# Patient Record
Sex: FEMALE | Race: WHITE | NOT HISPANIC OR LATINO | URBAN - METROPOLITAN AREA
[De-identification: names, ages, dates, MRNs, and addresses within clinical notes are randomized per-mention and may not be internally consistent; named-entity substitution may affect disease eponyms.]

---

## 2018-12-11 ENCOUNTER — INPATIENT (INPATIENT)
Facility: HOSPITAL | Age: 83
LOS: 2 days | Discharge: HOME | DRG: 571 | End: 2018-12-14
Attending: INTERNAL MEDICINE | Admitting: INTERNAL MEDICINE
Payer: MEDICARE

## 2018-12-11 VITALS
RESPIRATION RATE: 18 BRPM | OXYGEN SATURATION: 97 % | DIASTOLIC BLOOD PRESSURE: 81 MMHG | TEMPERATURE: 98 F | SYSTOLIC BLOOD PRESSURE: 164 MMHG | HEART RATE: 73 BPM

## 2018-12-11 DIAGNOSIS — N39.0 URINARY TRACT INFECTION, SITE NOT SPECIFIED: ICD-10-CM

## 2018-12-11 DIAGNOSIS — I10 ESSENTIAL (PRIMARY) HYPERTENSION: ICD-10-CM

## 2018-12-11 DIAGNOSIS — L03.90 CELLULITIS, UNSPECIFIED: ICD-10-CM

## 2018-12-11 DIAGNOSIS — T14.8XXA OTHER INJURY OF UNSPECIFIED BODY REGION, INITIAL ENCOUNTER: ICD-10-CM

## 2018-12-11 DIAGNOSIS — M79.89 OTHER SPECIFIED SOFT TISSUE DISORDERS: ICD-10-CM

## 2018-12-11 DIAGNOSIS — Z29.9 ENCOUNTER FOR PROPHYLACTIC MEASURES, UNSPECIFIED: ICD-10-CM

## 2018-12-11 LAB
ALBUMIN SERPL ELPH-MCNC: 3.1 G/DL — LOW (ref 3.5–5)
ALP SERPL-CCNC: 82 U/L — SIGNIFICANT CHANGE UP (ref 40–120)
ALT FLD-CCNC: 23 U/L DA — SIGNIFICANT CHANGE UP (ref 10–60)
ANION GAP SERPL CALC-SCNC: 11 MMOL/L — SIGNIFICANT CHANGE UP (ref 5–17)
APPEARANCE UR: CLEAR — SIGNIFICANT CHANGE UP
APTT BLD: 29.4 SEC — SIGNIFICANT CHANGE UP (ref 27.5–36.3)
AST SERPL-CCNC: 25 U/L — SIGNIFICANT CHANGE UP (ref 10–40)
BASOPHILS # BLD AUTO: 0.1 K/UL — SIGNIFICANT CHANGE UP (ref 0–0.2)
BASOPHILS NFR BLD AUTO: 1.5 % — SIGNIFICANT CHANGE UP (ref 0–2)
BILIRUB SERPL-MCNC: 0.4 MG/DL — SIGNIFICANT CHANGE UP (ref 0.2–1.2)
BILIRUB UR-MCNC: NEGATIVE — SIGNIFICANT CHANGE UP
BUN SERPL-MCNC: 33 MG/DL — HIGH (ref 7–18)
CALCIUM SERPL-MCNC: 9 MG/DL — SIGNIFICANT CHANGE UP (ref 8.4–10.5)
CHLORIDE SERPL-SCNC: 109 MMOL/L — HIGH (ref 96–108)
CO2 SERPL-SCNC: 22 MMOL/L — SIGNIFICANT CHANGE UP (ref 22–31)
COLOR SPEC: YELLOW — SIGNIFICANT CHANGE UP
CREAT SERPL-MCNC: 1.1 MG/DL — SIGNIFICANT CHANGE UP (ref 0.5–1.3)
DIFF PNL FLD: ABNORMAL
EOSINOPHIL # BLD AUTO: 0.1 K/UL — SIGNIFICANT CHANGE UP (ref 0–0.5)
EOSINOPHIL NFR BLD AUTO: 2 % — SIGNIFICANT CHANGE UP (ref 0–6)
GLUCOSE SERPL-MCNC: 93 MG/DL — SIGNIFICANT CHANGE UP (ref 70–99)
GLUCOSE UR QL: NEGATIVE — SIGNIFICANT CHANGE UP
HCT VFR BLD CALC: 39.5 % — SIGNIFICANT CHANGE UP (ref 34.5–45)
HGB BLD-MCNC: 12.5 G/DL — SIGNIFICANT CHANGE UP (ref 11.5–15.5)
INR BLD: 1.12 RATIO — SIGNIFICANT CHANGE UP (ref 0.88–1.16)
KETONES UR-MCNC: ABNORMAL
LACTATE SERPL-SCNC: 1.3 MMOL/L — SIGNIFICANT CHANGE UP (ref 0.7–2)
LEUKOCYTE ESTERASE UR-ACNC: ABNORMAL
LYMPHOCYTES # BLD AUTO: 2.2 K/UL — SIGNIFICANT CHANGE UP (ref 1–3.3)
LYMPHOCYTES # BLD AUTO: 29.3 % — SIGNIFICANT CHANGE UP (ref 13–44)
MCHC RBC-ENTMCNC: 29.9 PG — SIGNIFICANT CHANGE UP (ref 27–34)
MCHC RBC-ENTMCNC: 31.7 GM/DL — LOW (ref 32–36)
MCV RBC AUTO: 94.4 FL — SIGNIFICANT CHANGE UP (ref 80–100)
MONOCYTES # BLD AUTO: 0.7 K/UL — SIGNIFICANT CHANGE UP (ref 0–0.9)
MONOCYTES NFR BLD AUTO: 9.6 % — SIGNIFICANT CHANGE UP (ref 2–14)
NEUTROPHILS # BLD AUTO: 4.4 K/UL — SIGNIFICANT CHANGE UP (ref 1.8–7.4)
NEUTROPHILS NFR BLD AUTO: 57.6 % — SIGNIFICANT CHANGE UP (ref 43–77)
NITRITE UR-MCNC: NEGATIVE — SIGNIFICANT CHANGE UP
PH UR: 5 — SIGNIFICANT CHANGE UP (ref 5–8)
PLATELET # BLD AUTO: 358 K/UL — SIGNIFICANT CHANGE UP (ref 150–400)
POTASSIUM SERPL-MCNC: 4.4 MMOL/L — SIGNIFICANT CHANGE UP (ref 3.5–5.3)
POTASSIUM SERPL-SCNC: 4.4 MMOL/L — SIGNIFICANT CHANGE UP (ref 3.5–5.3)
PROT SERPL-MCNC: 7.4 G/DL — SIGNIFICANT CHANGE UP (ref 6–8.3)
PROT UR-MCNC: 30 MG/DL
PROTHROM AB SERPL-ACNC: 12.5 SEC — SIGNIFICANT CHANGE UP (ref 10–12.9)
RBC # BLD: 4.18 M/UL — SIGNIFICANT CHANGE UP (ref 3.8–5.2)
RBC # FLD: 13.5 % — SIGNIFICANT CHANGE UP (ref 10.3–14.5)
SODIUM SERPL-SCNC: 142 MMOL/L — SIGNIFICANT CHANGE UP (ref 135–145)
SP GR SPEC: 1.02 — SIGNIFICANT CHANGE UP (ref 1.01–1.02)
UROBILINOGEN FLD QL: NEGATIVE — SIGNIFICANT CHANGE UP
WBC # BLD: 7.6 K/UL — SIGNIFICANT CHANGE UP (ref 3.8–10.5)
WBC # FLD AUTO: 7.6 K/UL — SIGNIFICANT CHANGE UP (ref 3.8–10.5)

## 2018-12-11 PROCEDURE — 71045 X-RAY EXAM CHEST 1 VIEW: CPT | Mod: 26

## 2018-12-11 PROCEDURE — 73590 X-RAY EXAM OF LOWER LEG: CPT | Mod: 26,LT

## 2018-12-11 PROCEDURE — 99285 EMERGENCY DEPT VISIT HI MDM: CPT

## 2018-12-11 PROCEDURE — 93971 EXTREMITY STUDY: CPT | Mod: 26,LT

## 2018-12-11 RX ORDER — VANCOMYCIN HCL 1 G
1000 VIAL (EA) INTRAVENOUS ONCE
Qty: 0 | Refills: 0 | Status: COMPLETED | OUTPATIENT
Start: 2018-12-11 | End: 2018-12-11

## 2018-12-11 RX ORDER — AMPICILLIN SODIUM AND SULBACTAM SODIUM 250; 125 MG/ML; MG/ML
INJECTION, POWDER, FOR SUSPENSION INTRAMUSCULAR; INTRAVENOUS
Qty: 0 | Refills: 0 | Status: DISCONTINUED | OUTPATIENT
Start: 2018-12-11 | End: 2018-12-14

## 2018-12-11 RX ORDER — LISINOPRIL 2.5 MG/1
40 TABLET ORAL DAILY
Qty: 0 | Refills: 0 | Status: DISCONTINUED | OUTPATIENT
Start: 2018-12-11 | End: 2018-12-14

## 2018-12-11 RX ORDER — ACETAMINOPHEN 500 MG
650 TABLET ORAL EVERY 6 HOURS
Qty: 0 | Refills: 0 | Status: DISCONTINUED | OUTPATIENT
Start: 2018-12-11 | End: 2018-12-14

## 2018-12-11 RX ORDER — AMPICILLIN SODIUM AND SULBACTAM SODIUM 250; 125 MG/ML; MG/ML
1.5 INJECTION, POWDER, FOR SUSPENSION INTRAMUSCULAR; INTRAVENOUS ONCE
Qty: 0 | Refills: 0 | Status: COMPLETED | OUTPATIENT
Start: 2018-12-11 | End: 2018-12-11

## 2018-12-11 RX ORDER — AMPICILLIN SODIUM AND SULBACTAM SODIUM 250; 125 MG/ML; MG/ML
1.5 INJECTION, POWDER, FOR SUSPENSION INTRAMUSCULAR; INTRAVENOUS EVERY 6 HOURS
Qty: 0 | Refills: 0 | Status: DISCONTINUED | OUTPATIENT
Start: 2018-12-12 | End: 2018-12-14

## 2018-12-11 RX ORDER — ENOXAPARIN SODIUM 100 MG/ML
40 INJECTION SUBCUTANEOUS DAILY
Qty: 0 | Refills: 0 | Status: DISCONTINUED | OUTPATIENT
Start: 2018-12-11 | End: 2018-12-14

## 2018-12-11 RX ADMIN — Medication 250 MILLIGRAM(S): at 15:50

## 2018-12-11 RX ADMIN — AMPICILLIN SODIUM AND SULBACTAM SODIUM 100 GRAM(S): 250; 125 INJECTION, POWDER, FOR SUSPENSION INTRAMUSCULAR; INTRAVENOUS at 18:18

## 2018-12-11 RX ADMIN — Medication 650 MILLIGRAM(S): at 19:21

## 2018-12-11 RX ADMIN — Medication 650 MILLIGRAM(S): at 18:18

## 2018-12-11 NOTE — H&P ADULT - HISTORY OF PRESENT ILLNESS
89F, from home with HHA X6hrs, walks with walker PMHx of HTN, chronic leg swelling, disc herniation  presented to ED c/o wound in lt. leg. She states she has been having swelling of B/L leg since many years and usually gets worse during summer. She noticed it got sore 1 month ago and developed blister 3 wks ago, then 1 week ago, it bursted and she was taking ciprofloxacin since friday with wound care at home. She says she had all cardiac work up done for leg swelling and was normal. She was on lasix but is non-complaint. Denies insect bite, burn, shortness of breath, chest pain, cough, N/V/D, fever.    ED course: Vitals stable  Labs significant for grossly positive UA  Radiological findings venous doppler shows There is subcutaneous edema in the calf, No evidence of left lower extremity deep venous thrombosis. Xray tibia and fibula shows Edema in most of the lower leg and ankle. No bone destruction or fracture is evident.  Antimicrobial- vanco 1 dose 89F, from home with HHA X6hrs, walks with walker PMHx of HTN, chronic leg swelling, disc herniation  presented to ED c/o wound in lt. leg. She states she has been having swelling of B/L leg since many years and usually gets worse during summer. She noticed it got sore 1 month ago and developed blister 3 wks ago, then 1 week ago, it bursted and she was taking ciprofloxacin since friday with wound care at home. She says she had all cardiac work up done for leg swelling and was normal. She was on lasix but is non-complaint. Denies insect bite, burn, shortness of breath, chest pain, cough, N/V/D, fever, no urinary complaints.    ED course: Vitals stable  Labs significant for grossly positive UA  Radiological findings venous doppler shows There is subcutaneous edema in the calf, No evidence of left lower extremity deep venous thrombosis. Xray tibia and fibula shows Edema in most of the lower leg and ankle. No bone destruction or fracture is evident.  Antimicrobial- vanco 1 dose

## 2018-12-11 NOTE — H&P ADULT - PROBLEM SELECTOR PLAN 3
C/W BP medications  Currently on lisinopril home dose  BP WNL upon evaluation  Continue to monitor no urinary complaints  UA positive  on unasyn D1  FU UCx  ID Dr. Eaton

## 2018-12-11 NOTE — H&P ADULT - PROBLEM SELECTOR PLAN 2
p/w chronic B/L leg swelling  likely chronic venous insufficiency, complaints of skin change recently  ruled out DVT- venous doppler negative  less likely cardiac, but on lasix at home, FU ECHO  no varicose veins  PT consult

## 2018-12-11 NOTE — ED PROVIDER NOTE - MEDICAL DECISION MAKING DETAILS
pt presentin with leg swellin in setting of wound infection after out pt treatment failure. Will check labs, give antibiotics and likely admit due to failure of out pt treatment.

## 2018-12-11 NOTE — H&P ADULT - PROBLEM SELECTOR PLAN 1
P/W wound on lt.leg  s/p cipro x 5days,no improvement  will start on unasyn D1  tylenol for pain  wound care consult P/W wound on lt.leg  s/p cipro x 5days,no improvement  will start on unasyn D1  tylenol for pain  wound care consult  ID Dr. Eaton consulted

## 2018-12-11 NOTE — H&P ADULT - ASSESSMENT
89F, from home with HHA X6hrs, walks with walker PMHx of HTN, chronic leg swelling, disc herniation  presented to ED c/o wound in lt. leg with chronic B/L leg swelling.    ED course: Vitals stable  Labs significant for grossly positive UA  Radiological findings venous doppler shows There is subcutaneous edema in the calf, No evidence of left lower extremity deep venous thrombosis. Xray tibia and fibula shows Edema in most of the lower leg and ankle. No bone destruction or fracture is evident.  Antimicrobial- vanco 1 dose    She is admitted for cellulitis and work up for chronic B/L leg swelling.

## 2018-12-11 NOTE — ED ADULT NURSE NOTE - NSIMPLEMENTINTERV_GEN_ALL_ED
Implemented All Fall with Harm Risk Interventions:  [place] to call system. Call bell, personal items and telephone within reach. Instruct patient to call for assistance. Room bathroom lighting operational. Non-slip footwear when patient is off stretcher. Physically safe environment: no spills, clutter or unnecessary equipment. Stretcher in lowest position, wheels locked, appropriate side rails in place. Provide visual cue, wrist band, yellow gown, etc. Monitor gait and stability. Monitor for mental status changes and reorient to person, place, and time. Review medications for side effects contributing to fall risk. Reinforce activity limits and safety measures with patient and family. Provide visual clues: red socks.

## 2018-12-11 NOTE — ED PROVIDER NOTE - OBJECTIVE STATEMENT
89 y/o F pt with PMHx of chronic b/l leg swelling presenting with worsening swelling to L leg. Pt endorses she noted a wound that started in the L leg 3 weeks ago. Pt was placed on Cipro 3 days ago by PMD but swelling has not improved, in fact pt noticed purulent d/c. Denies fever, nausea, vomiting, chest pain, SOB.

## 2018-12-11 NOTE — H&P ADULT - PROBLEM SELECTOR PLAN 5
IMPROVE VTE Individual Risk Assessment    RISK                                                                Points    [  ] Previous VTE                                                  3  [  ] Thrombophilia                                               2  [  ] Lower limb paralysis                                      2        (unable to hold up >15 seconds)    [  ] Current Cancer                                              2         (within 6 months)  [ x ] Immobilization > 24 hrs                                1  [  ] ICU/CCU stay > 24 hours                              1  [ x ] Age > 60                                                      1    IMPROVE VTE Score 2, on lovenox for DVT PPx  No indication for GI PPx

## 2018-12-11 NOTE — H&P ADULT - PROBLEM SELECTOR PLAN 4
IMPROVE VTE Individual Risk Assessment    RISK                                                                Points    [  ] Previous VTE                                                  3  [  ] Thrombophilia                                               2  [  ] Lower limb paralysis                                      2        (unable to hold up >15 seconds)    [  ] Current Cancer                                              2         (within 6 months)  [ x ] Immobilization > 24 hrs                                1  [  ] ICU/CCU stay > 24 hours                              1  [ x ] Age > 60                                                      1    IMPROVE VTE Score 2, on lovenox for DVT PPx  No indication for GI PPx C/W BP medications  Currently on lisinopril home dose  BP WNL upon evaluation  Continue to monitor

## 2018-12-11 NOTE — ED ADULT NURSE NOTE - OBJECTIVE STATEMENT
Pain and swelling to both legs chronic for several years but worsened to left leg with open weeping wounds.

## 2018-12-12 LAB
24R-OH-CALCIDIOL SERPL-MCNC: 15.1 NG/ML — LOW (ref 30–80)
ANION GAP SERPL CALC-SCNC: 9 MMOL/L — SIGNIFICANT CHANGE UP (ref 5–17)
BASOPHILS # BLD AUTO: 0.1 K/UL — SIGNIFICANT CHANGE UP (ref 0–0.2)
BASOPHILS NFR BLD AUTO: 1.1 % — SIGNIFICANT CHANGE UP (ref 0–2)
BUN SERPL-MCNC: 27 MG/DL — HIGH (ref 7–18)
CALCIUM SERPL-MCNC: 8.7 MG/DL — SIGNIFICANT CHANGE UP (ref 8.4–10.5)
CHLORIDE SERPL-SCNC: 110 MMOL/L — HIGH (ref 96–108)
CHOLEST SERPL-MCNC: 146 MG/DL — SIGNIFICANT CHANGE UP (ref 10–199)
CO2 SERPL-SCNC: 22 MMOL/L — SIGNIFICANT CHANGE UP (ref 22–31)
CREAT SERPL-MCNC: 0.92 MG/DL — SIGNIFICANT CHANGE UP (ref 0.5–1.3)
CULTURE RESULTS: NO GROWTH — SIGNIFICANT CHANGE UP
EOSINOPHIL # BLD AUTO: 0.2 K/UL — SIGNIFICANT CHANGE UP (ref 0–0.5)
EOSINOPHIL NFR BLD AUTO: 1.9 % — SIGNIFICANT CHANGE UP (ref 0–6)
FOLATE SERPL-MCNC: 11.6 NG/ML — SIGNIFICANT CHANGE UP
GLUCOSE SERPL-MCNC: 90 MG/DL — SIGNIFICANT CHANGE UP (ref 70–99)
HBA1C BLD-MCNC: 6.1 % — HIGH (ref 4–5.6)
HCT VFR BLD CALC: 37.3 % — SIGNIFICANT CHANGE UP (ref 34.5–45)
HDLC SERPL-MCNC: 53 MG/DL — SIGNIFICANT CHANGE UP
HGB BLD-MCNC: 11.8 G/DL — SIGNIFICANT CHANGE UP (ref 11.5–15.5)
LIPID PNL WITH DIRECT LDL SERPL: 81 MG/DL — SIGNIFICANT CHANGE UP
LYMPHOCYTES # BLD AUTO: 2.6 K/UL — SIGNIFICANT CHANGE UP (ref 1–3.3)
LYMPHOCYTES # BLD AUTO: 31.2 % — SIGNIFICANT CHANGE UP (ref 13–44)
MAGNESIUM SERPL-MCNC: 2.4 MG/DL — SIGNIFICANT CHANGE UP (ref 1.6–2.6)
MCHC RBC-ENTMCNC: 28.9 PG — SIGNIFICANT CHANGE UP (ref 27–34)
MCHC RBC-ENTMCNC: 31.5 GM/DL — LOW (ref 32–36)
MCV RBC AUTO: 91.9 FL — SIGNIFICANT CHANGE UP (ref 80–100)
MONOCYTES # BLD AUTO: 0.8 K/UL — SIGNIFICANT CHANGE UP (ref 0–0.9)
MONOCYTES NFR BLD AUTO: 9.8 % — SIGNIFICANT CHANGE UP (ref 2–14)
NEUTROPHILS # BLD AUTO: 4.6 K/UL — SIGNIFICANT CHANGE UP (ref 1.8–7.4)
NEUTROPHILS NFR BLD AUTO: 56 % — SIGNIFICANT CHANGE UP (ref 43–77)
PHOSPHATE SERPL-MCNC: 3.4 MG/DL — SIGNIFICANT CHANGE UP (ref 2.5–4.5)
PLATELET # BLD AUTO: 323 K/UL — SIGNIFICANT CHANGE UP (ref 150–400)
POTASSIUM SERPL-MCNC: 4.3 MMOL/L — SIGNIFICANT CHANGE UP (ref 3.5–5.3)
POTASSIUM SERPL-SCNC: 4.3 MMOL/L — SIGNIFICANT CHANGE UP (ref 3.5–5.3)
RBC # BLD: 4.06 M/UL — SIGNIFICANT CHANGE UP (ref 3.8–5.2)
RBC # FLD: 13.8 % — SIGNIFICANT CHANGE UP (ref 10.3–14.5)
SODIUM SERPL-SCNC: 141 MMOL/L — SIGNIFICANT CHANGE UP (ref 135–145)
SPECIMEN SOURCE: SIGNIFICANT CHANGE UP
TOTAL CHOLESTEROL/HDL RATIO MEASUREMENT: 2.8 RATIO — LOW (ref 3.3–7.1)
TRIGL SERPL-MCNC: 61 MG/DL — SIGNIFICANT CHANGE UP (ref 10–149)
TSH SERPL-MCNC: 0.86 UU/ML — SIGNIFICANT CHANGE UP (ref 0.34–4.82)
VIT B12 SERPL-MCNC: 381 PG/ML — SIGNIFICANT CHANGE UP (ref 232–1245)
WBC # BLD: 8.3 K/UL — SIGNIFICANT CHANGE UP (ref 3.8–10.5)
WBC # FLD AUTO: 8.3 K/UL — SIGNIFICANT CHANGE UP (ref 3.8–10.5)

## 2018-12-12 RX ORDER — INFLUENZA VIRUS VACCINE 15; 15; 15; 15 UG/.5ML; UG/.5ML; UG/.5ML; UG/.5ML
0.5 SUSPENSION INTRAMUSCULAR ONCE
Qty: 0 | Refills: 0 | Status: DISCONTINUED | OUTPATIENT
Start: 2018-12-12 | End: 2018-12-14

## 2018-12-12 RX ORDER — TRAMADOL HYDROCHLORIDE 50 MG/1
25 TABLET ORAL ONCE
Qty: 0 | Refills: 0 | Status: DISCONTINUED | OUTPATIENT
Start: 2018-12-12 | End: 2018-12-12

## 2018-12-12 RX ORDER — TRAMADOL HYDROCHLORIDE 50 MG/1
25 TABLET ORAL EVERY 8 HOURS
Qty: 0 | Refills: 0 | Status: DISCONTINUED | OUTPATIENT
Start: 2018-12-12 | End: 2018-12-14

## 2018-12-12 RX ORDER — ERGOCALCIFEROL 1.25 MG/1
50000 CAPSULE ORAL
Qty: 0 | Refills: 0 | Status: DISCONTINUED | OUTPATIENT
Start: 2018-12-12 | End: 2018-12-14

## 2018-12-12 RX ADMIN — AMPICILLIN SODIUM AND SULBACTAM SODIUM 100 GRAM(S): 250; 125 INJECTION, POWDER, FOR SUSPENSION INTRAMUSCULAR; INTRAVENOUS at 20:04

## 2018-12-12 RX ADMIN — TRAMADOL HYDROCHLORIDE 25 MILLIGRAM(S): 50 TABLET ORAL at 02:51

## 2018-12-12 RX ADMIN — AMPICILLIN SODIUM AND SULBACTAM SODIUM 100 GRAM(S): 250; 125 INJECTION, POWDER, FOR SUSPENSION INTRAMUSCULAR; INTRAVENOUS at 12:24

## 2018-12-12 RX ADMIN — TRAMADOL HYDROCHLORIDE 25 MILLIGRAM(S): 50 TABLET ORAL at 03:21

## 2018-12-12 RX ADMIN — ENOXAPARIN SODIUM 40 MILLIGRAM(S): 100 INJECTION SUBCUTANEOUS at 12:24

## 2018-12-12 RX ADMIN — LISINOPRIL 40 MILLIGRAM(S): 2.5 TABLET ORAL at 05:34

## 2018-12-12 RX ADMIN — AMPICILLIN SODIUM AND SULBACTAM SODIUM 100 GRAM(S): 250; 125 INJECTION, POWDER, FOR SUSPENSION INTRAMUSCULAR; INTRAVENOUS at 05:34

## 2018-12-12 RX ADMIN — AMPICILLIN SODIUM AND SULBACTAM SODIUM 100 GRAM(S): 250; 125 INJECTION, POWDER, FOR SUSPENSION INTRAMUSCULAR; INTRAVENOUS at 00:12

## 2018-12-12 NOTE — PHYSICAL THERAPY INITIAL EVALUATION ADULT - ACTIVE RANGE OF MOTION EXAMINATION, REHAB EVAL
Limited Bilateral ankle DF to neutral due to swelling/deficits as listed below/bilateral upper extremity Active ROM was WFL (within functional limits)/bilateral  lower extremity Active ROM was WFL (within functional limits)

## 2018-12-12 NOTE — ADVANCED PRACTICE NURSE CONSULT - ASSESSMENT
This is a 89yr old female patient admitted for an Abrasion, presenting with a L. Lower Leg Venous Stasis Ulcer (8cm x 7cm) with dried slough, pink tissue, and scant drainage

## 2018-12-12 NOTE — ADVANCED PRACTICE NURSE CONSULT - RECOMMEDATIONS
-Clean the L. Lower Leg wound with normal saline and apply skin prep to the surrounding skin  -Apply MediHoney ointment to the wound bed, cover with an ABD pad, and wrap with an ACE wrap Daily PRN  -Elevate/float the patients heels using heel protectors and reposition the patient Q 2hrs using wedges or pillows

## 2018-12-12 NOTE — PHYSICAL THERAPY INITIAL EVALUATION ADULT - CRITERIA FOR SKILLED THERAPEUTIC INTERVENTIONS
impairments found/therapy frequency/functional limitations in following categories/anticipated equipment needs at discharge/anticipated discharge recommendation/risk reduction/prevention/rehab potential

## 2018-12-12 NOTE — CONSULT NOTE ADULT - ASSESSMENT
Left Lower leg venous stasis ulcer with cellulitis   xerosis, Onychomycosis, PVD/VI  H/O HTN, chronic leg swelling, disc herniation      Examined patient, reviewed patient's chart  Discusses treatment plans with patient   Performed excisional debridement of devitalized tissues with scalpel to and including subcutaneous level left LE ulcers  Cleaned wound with normal saline, applied medi-honey to all ulcers with dry compressive bandaging  Performed aseptic debridement of all toenails without complications  Continue elevation of bilateral LE and off load bilateral heel and ulcer sites
left leg cellulitis / non infected ulcer of left leg    plan - cont unasyn till tomorrow morning then can switch to ceftin 500 mgs po bid x 6 days  dc planning  reconsult prn

## 2018-12-12 NOTE — PHYSICAL THERAPY INITIAL EVALUATION ADULT - ADDITIONAL COMMENTS
As per patient, she owns both a quad cane and RW, and prefers to use a quad cane. She has been independent with community ambulation with quad cane.

## 2018-12-12 NOTE — PHYSICAL THERAPY INITIAL EVALUATION ADULT - TRANSFER SAFETY CONCERNS NOTED: SIT/STAND, REHAB EVAL
decreased weight-shifting ability/inability to maintain weight-bearing restrictions w/o assist/stand pivot

## 2018-12-12 NOTE — PHYSICAL THERAPY INITIAL EVALUATION ADULT - PASSIVE RANGE OF MOTION EXAMINATION, REHAB EVAL
bilateral upper extremity Passive ROM was WFL (within functional limits)/Limited Bilateral ankle DF to neutral due to swelling/bilateral lower extremity Passive ROM was WFL (within functional limits)/deficits as listed below

## 2018-12-12 NOTE — CONSULT NOTE ADULT - SUBJECTIVE AND OBJECTIVE BOX
89 Female with PMHx of HTN, chronic leg swelling, disc herniation  presented to ED c/o wound on left leg. She states she has been having swelling of B/L leg since many years and usually gets worse during summer. She noticed it got sore 1 month ago and developed blister 3 wks ago, then 1 week ago, it bursted and she was taking ciprofloxacin since Friday with wound care at home. She says she had all cardiac work up done for leg swelling and was normal. She was on lasix but is non-complaint. Denies insect bite, burn, shortness of breath, chest pain, cough, N/V/D, fever, no urinary complaints.     Review of Systems:  · Negative General Symptoms	no fever; no chills; no sweating  · Skin/Breast	negative  · Ophthalmologic	negative  · ENMT	negative  · Negative Respiratory and Thorax Symptoms	no wheezing; no dyspnea; no cough; no hemoptysis  · Negative Cardiovascular Symptoms	no chest pain; no palpitations; no dyspnea on exertion; no orthopnea  · Cardiovascular Symptoms	peripheral edema  · Negative Gastrointestinal Symptoms	no nausea; no vomiting  · Negative General Genitourinary Symptoms	no hematuria; no renal colic; no flank pain L; no flank pain R; no urine discoloration; no urinary hesitancy; normal urinary frequency; no nocturia  · Musculoskeletal Comments	left leg pain  · Neurological	negative  · Psychiatric	negative  · Hematology/Lymphatics	negative  · Endocrine	negative  · Allergic/Immunologic	negative      Allergies and Intolerances:        Allergies:  	No Known Allergies:     Home Medications:   * Patient Currently Takes Medications as of 11-Dec-2018 17:51 documented in Structured Notes  · 	benazepril 40 mg oral tablet: 1 tab(s) orally once a day  · 	Lasix 40 mg oral tablet: 1 tab(s) orally once a day  · 	Cipro 500 mg oral tablet: 1 tab(s) orally every 12 hours    Patient History:    Past Medical History:  Leg swelling.     Past Surgical History:  No significant past surgical history.     Family History:  No pertinent family history in first degree relatives.     Social History:  Social History (marital status, living situation, occupation, tobacco use, alcohol and drug use, and sexual history): Denies use of cigarettes and recreational drugs, drinks socially. Lives alone with HHA X6hrs.     Tobacco Screening:  · Core Measure Site	Yes  · Has the patient used tobacco in the past 30 days?	No    Risk Assessment:    Present on Admission:  Deep Venous Thrombosis	no  Pulmonary Embolus	no    PHYSICAL EXAMS:  88 y/o female seen at bedside in NAD for left lower leg venous stasis ulcer with cellulitis. A&Ox3.  Admitted with left lower leg venous stasis ulcer with cellulitis.  Daily wound care with medi-honey    Vital Signs Last 24 Hrs  T(C): 36.9 (12-12-18 @ 05:20), Max: 36.9 (12-12-18 @ 05:20)  HR: 81 (12-12-18 @ 05:20) (73 - 103)  BP: 136/90 (12-12-18 @ 05:20) (106/69 - 164/81)  RR: 18 (12-12-18 @ 05:20) (15 - 18)  SpO2: 95% (12-12-18 @ 05:20) (95% - 98%)  Wt(kg): --    Derm: Skin is warm and dry, scaly skin bilaterally with chronic venous stasis changes and hyperpigmentation bilateral lower leg  Toenails are elongated, thickened and dystrophic x 10  Vascular: palpable DP 0/4, PT 0/4 B/L pulses , with biphasic doppler sounds. 2+ pitting edema bilateral LE  Left lateral lower leg ulcer measures: 10.0 cm x 5.5 cm x 0.1 cm with fibro-granular base with cellulitis no undermining, no active drainage, no active drainage, no purulent.   Neuro: Protective sensation wnl bilateral foot  M/S Decreased medial arch bilateral, dorsally contracted digits bilateral    LABS/DIAGNOSTIC TESTS                    11.8   8.3   )-----------( 323      ( 12 Dec 2018 07:02 )             37.3     12-12    141  |  110<H>  |  27<H>  ----------------------------<  90  4.3   |  22  |  0.92    Ca    8.7      12 Dec 2018 07:02  Phos  3.4     12-12  Mg     2.4     12-12    TPro  7.4  /  Alb  3.1<L>  /  TBili  0.4  /  DBili  x   /  AST  25  /  ALT  23  /  AlkPhos  82  12-11

## 2018-12-12 NOTE — CONSULT NOTE ADULT - SUBJECTIVE AND OBJECTIVE BOX
HPI:  89F, from home with HHA X6hrs, walks with walker PMHx of HTN, chronic leg swelling, disc herniation  presented to ED c/o wound in lt. leg. She states she has been having swelling of B/L leg since many years and usually gets worse during summer. She noticed it got sore 1 month ago and developed blister 3 wks ago, then 1 week ago, it bursted and she was taking ciprofloxacin since friday with wound care at home.  Her LLE venous doppler shows  subcutaneous edema in the calf, No evidence of left lower extremity deep venous thrombosis. Xray tibia and fibula shows Edema in most of the lower leg and ankle. Pt has UA+ but denies any urinary symptoms. Pt was started on Unasyn D#2. Denies insect bite, shortness of breath, chest pain, cough, N/V/D, fever, no urinary complaints.      REVIEW OF SYSTEMS:  [  ] Not able to illicit  General: no fevers no malaise  Chest: no cough no sob  GI: no nvd  : no urinary sxs   Skin: bilateral LE  venous statis dermatitis   Musculoskeletal: no trauma no LBP, bilateral LE swelling   Neuro: no ha's no dizziness 	    PAST MEDICAL & SURGICAL HISTORY:  Leg swelling  No significant past surgical history    ALLERGIES: No Known Allergies    MEDS:  acetaminophen   Tablet .. 650 milliGRAM(s) Oral every 6 hours PRN  ampicillin/sulbactam  IVPB      ampicillin/sulbactam  IVPB 1.5 Gram(s) IV Intermittent every 6 hours  enoxaparin Injectable 40 milliGRAM(s) SubCutaneous daily  influenza   Vaccine 0.5 milliLiter(s) IntraMuscular once  lisinopril 40 milliGRAM(s) Oral daily    SOCIAL HISTORY:  Smoker:      FAMILY HISTORY:  No pertinent family history in first degree relatives    VITALS:  Vital Signs Last 24 Hrs  T(C): 36.9 (12 Dec 2018 05:20), Max: 36.9 (12 Dec 2018 05:20)  T(F): 98.4 (12 Dec 2018 05:20), Max: 98.4 (12 Dec 2018 05:20)  HR: 81 (12 Dec 2018 05:20) (73 - 103)  BP: 136/90 (12 Dec 2018 05:20) (106/69 - 164/81)  BP(mean): --  RR: 18 (12 Dec 2018 05:20) (15 - 18)  SpO2: 95% (12 Dec 2018 05:20) (95% - 98%)      PHYSICAL EXAM:  HEENT: no oral lesions, no gross abnormalities   Neck: supple no LN's   Respiratory: lungs clear no rales, no wheezing, no rhonchi   Cardiovascular: S1 S2 reg no murmurs  Gastrointestinal: +BS with soft, nondistended, nontender, no rebound, no guarding  Extremities: bilateral LE edema with venous stasis dermatitis. Left leg has a venous stasis ulcer with scant drainage.   Skin: no rashes  Ortho: no joint swelling, no erythema   Neuro: AAO x 2        LABS/DIAGNOSTIC TESTS:                        11.8   8.3   )-----------( 323      ( 12 Dec 2018 07:02 )             37.3     WBC Count: 8.3 K/uL ( @ 07:02)  WBC Count: 7.6 K/uL ( @ 14:06)    12    141  |  110<H>  |  27<H>  ----------------------------<  90  4.3   |  22  |  0.92    Ca    8.7      12 Dec 2018 07:02  Phos  3.4     12  Mg     2.4         TPro  7.4  /  Alb  3.1<L>  /  TBili  0.4  /  DBili  x   /  AST  25  /  ALT  23  /  AlkPhos  82  12-11    Urinalysis Basic - ( 11 Dec 2018 15:52 )    Color: Yellow / Appearance: Clear / S.025 / pH: x  Gluc: x / Ketone: Trace  / Bili: Negative / Urobili: Negative   Blood: x / Protein: 30 mg/dL / Nitrite: Negative   Leuk Esterase: Moderate / RBC: 10-25 /HPF / WBC 26-50 /HPF   Sq Epi: x / Non Sq Epi: Moderate /HPF / Bacteria: Moderate /HPF      LIVER FUNCTIONS - ( 11 Dec 2018 14:06 )  Alb: 3.1 g/dL / Pro: 7.4 g/dL / ALK PHOS: 82 U/L / ALT: 23 U/L DA / AST: 25 U/L / GGT: x           PT/INR - ( 11 Dec 2018 14:07 )   PT: 12.5 sec;   INR: 1.12 ratio         PTT - ( 11 Dec 2018 14:07 )  PTT:29.4 sec  Lactate, Blood: 1.3 mmol/L ( @ 14:06)    ABG -     CULTURES:       RADIOLOGY: HPI:  89F, from home with HHA X6hrs, walks with walker PMHx of HTN, chronic leg swelling, disc herniation  presented to ED c/o wound in lt. leg. She states she has been having swelling of B/L leg since many years and usually gets worse during summer. She noticed it got sore 1 month ago and developed blister 3 wks ago, then 1 week ago, it bursted and she was taking ciprofloxacin since friday with wound care at home.  Her LLE venous doppler shows  subcutaneous edema in the calf, No evidence of left lower extremity deep venous thrombosis. Xray tibia and fibula shows Edema in most of the lower leg and ankle. Pt has UA+ but denies any urinary symptoms. Pt was started on Unasyn D#2. Denies insect bite, shortness of breath, chest pain, cough, N/V/D, fever, no urinary complaints.      REVIEW OF SYSTEMS:  [  ] Not able to illicit  General: no fevers no malaise  Chest: no cough no sob  GI: no nvd  : no urinary sxs   Skin: bilateral LE  venous statis dermatitis   Musculoskeletal: no trauma no LBP, bilateral LE swelling   Neuro: no ha's no dizziness 	    PAST MEDICAL & SURGICAL HISTORY:  Leg swelling  No significant past surgical history    ALLERGIES: No Known Allergies    MEDS:  acetaminophen   Tablet .. 650 milliGRAM(s) Oral every 6 hours PRN  ampicillin/sulbactam  IVPB      ampicillin/sulbactam  IVPB 1.5 Gram(s) IV Intermittent every 6 hours  enoxaparin Injectable 40 milliGRAM(s) SubCutaneous daily  influenza   Vaccine 0.5 milliLiter(s) IntraMuscular once  lisinopril 40 milliGRAM(s) Oral daily    SOCIAL HISTORY:  no tobacco   no etoh      FAMILY HISTORY:  No pertinent family history in first degree relatives    VITALS:  Vital Signs Last 24 Hrs  T(C): 36.9 (12 Dec 2018 05:20), Max: 36.9 (12 Dec 2018 05:20)  T(F): 98.4 (12 Dec 2018 05:20), Max: 98.4 (12 Dec 2018 05:20)  HR: 81 (12 Dec 2018 05:20) (73 - 103)  BP: 136/90 (12 Dec 2018 05:20) (106/69 - 164/81)  BP(mean): --  RR: 18 (12 Dec 2018 05:20) (15 - 18)  SpO2: 95% (12 Dec 2018 05:20) (95% - 98%)      PHYSICAL EXAM:  HEENT: no oral lesions, no gross abnormalities   Neck: supple no LN's   Respiratory: lungs clear no rales, no wheezing, no rhonchi   Cardiovascular: S1 S2 reg no murmurs  Gastrointestinal: +BS with soft, nondistended, nontender, no rebound, no guarding  Extremities: bilateral LE edema with venous stasis dermatitis. Left leg has a venous stasis ulcer with scant drainage.   Skin: no rashes  Ortho: no joint swelling, no erythema   Neuro: AAO x 2        LABS/DIAGNOSTIC TESTS:                        11.8   8.3   )-----------( 323      ( 12 Dec 2018 07:02 )             37.3     WBC Count: 8.3 K/uL ( @ 07:02)  WBC Count: 7.6 K/uL ( @ 14:06)    12    141  |  110<H>  |  27<H>  ----------------------------<  90  4.3   |  22  |  0.92    Ca    8.7      12 Dec 2018 07:02  Phos  3.4     12  Mg     2.4         TPro  7.4  /  Alb  3.1<L>  /  TBili  0.4  /  DBili  x   /  AST  25  /  ALT  23  /  AlkPhos  82  12-    Urinalysis Basic - ( 11 Dec 2018 15:52 )    Color: Yellow / Appearance: Clear / S.025 / pH: x  Gluc: x / Ketone: Trace  / Bili: Negative / Urobili: Negative   Blood: x / Protein: 30 mg/dL / Nitrite: Negative   Leuk Esterase: Moderate / RBC: 10-25 /HPF / WBC 26-50 /HPF   Sq Epi: x / Non Sq Epi: Moderate /HPF / Bacteria: Moderate /HPF      LIVER FUNCTIONS - ( 11 Dec 2018 14:06 )  Alb: 3.1 g/dL / Pro: 7.4 g/dL / ALK PHOS: 82 U/L / ALT: 23 U/L DA / AST: 25 U/L / GGT: x           PT/INR - ( 11 Dec 2018 14:07 )   PT: 12.5 sec;   INR: 1.12 ratio         PTT - ( 11 Dec 2018 14:07 )  PTT:29.4 sec  Lactate, Blood: 1.3 mmol/L ( @ 14:06)    ABG -     CULTURES:       RADIOLOGY: HPI:  89F, from home with HHA X6hrs, walks with walker PMHx of HTN, chronic leg swelling, disc herniation  presented to ED c/o wound in lt. leg. She states she has been having swelling of B/L leg since many years and usually gets worse during summer. She noticed it got sore 1 month ago and developed blister 3 wks ago, then 1 week ago, blister burst and  was put on ciprofloxacin since friday with wound care at home.  Her LLE venous doppler shows  subcutaneous edema in the calf, No evidence of left lower extremity deep venous thrombosis. Xray tibia and fibula shows Edema in most of the lower leg and ankle. Pt has UA+ but denies any urinary symptoms. Pt was started on Unasyn D#2. Denies insect bite, shortness of breath, chest pain, cough, N/V/D, fever, no urinary complaints. she has no fevers or chills but c/o pain at the ulcer site and swelling of her left leg.      REVIEW OF SYSTEMS:  [  ] Not able to illicit  General: no fevers no malaise  Chest: no cough no sob  GI: no nvd  : no urinary sxs   Skin: bilateral LE  venous statis dermatitis   Musculoskeletal: no trauma no LBP, bilateral LE swelling   Neuro: no ha's no dizziness 	    PAST MEDICAL & SURGICAL HISTORY:  Leg swelling  No significant past surgical history    ALLERGIES: No Known Allergies    MEDS:  acetaminophen   Tablet .. 650 milliGRAM(s) Oral every 6 hours PRN  ampicillin/sulbactam  IVPB      ampicillin/sulbactam  IVPB 1.5 Gram(s) IV Intermittent every 6 hours  enoxaparin Injectable 40 milliGRAM(s) SubCutaneous daily  influenza   Vaccine 0.5 milliLiter(s) IntraMuscular once  lisinopril 40 milliGRAM(s) Oral daily    SOCIAL HISTORY:  no tobacco   no etoh      FAMILY HISTORY:  No pertinent family history in first degree relatives    VITALS:  Vital Signs Last 24 Hrs  T(C): 36.9 (12 Dec 2018 05:20), Max: 36.9 (12 Dec 2018 05:20)  T(F): 98.4 (12 Dec 2018 05:20), Max: 98.4 (12 Dec 2018 05:20)  HR: 81 (12 Dec 2018 05:20) (73 - 103)  BP: 136/90 (12 Dec 2018 05:20) (106/69 - 164/81)  BP(mean): --  RR: 18 (12 Dec 2018 05:20) (15 - 18)  SpO2: 95% (12 Dec 2018 05:20) (95% - 98%)      PHYSICAL EXAM:  HEENT: no oral lesions, no gross abnormalities   Neck: supple no LN's   Respiratory: lungs clear no rales, no wheezing, no rhonchi   Cardiovascular: S1 S2 reg no murmurs  Gastrointestinal: +BS with soft, nondistended, nontender, no rebound, no guarding  Extremities: bilateral LE edema with venous stasis dermatitis. Left leg has a venous stasis ulcer with scant drainage.   Skin: warmth and some redness of her left leg mostly surrounding her ulcer which is superficial and very tender, the ulcer base is bloody but not infected appearing  Ortho: no joint swelling, no erythema   Neuro: AAO x 3 , no focal deficits        LABS/DIAGNOSTIC TESTS:                        11.8   8.3   )-----------( 323      ( 12 Dec 2018 07:02 )             37.3     WBC Count: 8.3 K/uL ( @ 07:02)  WBC Count: 7.6 K/uL ( @ 14:06)    12    141  |  110<H>  |  27<H>  ----------------------------<  90  4.3   |  22  |  0.92    Ca    8.7      12 Dec 2018 07:02  Phos  3.4     12-12  Mg     2.4     12-12    TPro  7.4  /  Alb  3.1<L>  /  TBili  0.4  /  DBili  x   /  AST  25  /  ALT  23  /  AlkPhos  82  12-11    Urinalysis Basic - ( 11 Dec 2018 15:52 )    Color: Yellow / Appearance: Clear / S.025 / pH: x  Gluc: x / Ketone: Trace  / Bili: Negative / Urobili: Negative   Blood: x / Protein: 30 mg/dL / Nitrite: Negative   Leuk Esterase: Moderate / RBC: 10-25 /HPF / WBC 26-50 /HPF   Sq Epi: x / Non Sq Epi: Moderate /HPF / Bacteria: Moderate /HPF      LIVER FUNCTIONS - ( 11 Dec 2018 14:06 )  Alb: 3.1 g/dL / Pro: 7.4 g/dL / ALK PHOS: 82 U/L / ALT: 23 U/L DA / AST: 25 U/L / GGT: x           PT/INR - ( 11 Dec 2018 14:07 )   PT: 12.5 sec;   INR: 1.12 ratio         PTT - ( 11 Dec 2018 14:07 )  PTT:29.4 sec  Lactate, Blood: 1.3 mmol/L ( @ 14:06)    ABG -     CULTURES:       RADIOLOGY:< from: Xray Chest 1 View- PORTABLE-Urgent (18 @ 22:04) >  EXAM:  XR CHEST PORTABLE URGENT 1V                            PROCEDURE DATE:  2018          INTERPRETATION:  AP semierect chest on 2018 at 9:54 PM.    COMPARISON: None available.    Heart is enlarged.    Slight densities at the lung bases consistent with slight scars or   atelectases noted.    Small calcific density overlying right trachea likely is thyroid related.    IMPRESSION: As above.      < end of copied text >

## 2018-12-13 ENCOUNTER — TRANSCRIPTION ENCOUNTER (OUTPATIENT)
Age: 83
End: 2018-12-13

## 2018-12-13 LAB
ALBUMIN SERPL ELPH-MCNC: 2.4 G/DL — LOW (ref 3.5–5)
ALP SERPL-CCNC: 61 U/L — SIGNIFICANT CHANGE UP (ref 40–120)
ALT FLD-CCNC: 18 U/L DA — SIGNIFICANT CHANGE UP (ref 10–60)
ANION GAP SERPL CALC-SCNC: 7 MMOL/L — SIGNIFICANT CHANGE UP (ref 5–17)
AST SERPL-CCNC: 20 U/L — SIGNIFICANT CHANGE UP (ref 10–40)
BASOPHILS # BLD AUTO: 0.1 K/UL — SIGNIFICANT CHANGE UP (ref 0–0.2)
BASOPHILS NFR BLD AUTO: 1.2 % — SIGNIFICANT CHANGE UP (ref 0–2)
BILIRUB SERPL-MCNC: 0.5 MG/DL — SIGNIFICANT CHANGE UP (ref 0.2–1.2)
BUN SERPL-MCNC: 21 MG/DL — HIGH (ref 7–18)
CALCIUM SERPL-MCNC: 8.4 MG/DL — SIGNIFICANT CHANGE UP (ref 8.4–10.5)
CHLORIDE SERPL-SCNC: 111 MMOL/L — HIGH (ref 96–108)
CO2 SERPL-SCNC: 25 MMOL/L — SIGNIFICANT CHANGE UP (ref 22–31)
CREAT SERPL-MCNC: 0.94 MG/DL — SIGNIFICANT CHANGE UP (ref 0.5–1.3)
EOSINOPHIL # BLD AUTO: 0.2 K/UL — SIGNIFICANT CHANGE UP (ref 0–0.5)
EOSINOPHIL NFR BLD AUTO: 2 % — SIGNIFICANT CHANGE UP (ref 0–6)
GLUCOSE SERPL-MCNC: 92 MG/DL — SIGNIFICANT CHANGE UP (ref 70–99)
HCT VFR BLD CALC: 34.5 % — SIGNIFICANT CHANGE UP (ref 34.5–45)
HGB BLD-MCNC: 10.8 G/DL — LOW (ref 11.5–15.5)
LYMPHOCYTES # BLD AUTO: 2.8 K/UL — SIGNIFICANT CHANGE UP (ref 1–3.3)
LYMPHOCYTES # BLD AUTO: 33.4 % — SIGNIFICANT CHANGE UP (ref 13–44)
MAGNESIUM SERPL-MCNC: 2.3 MG/DL — SIGNIFICANT CHANGE UP (ref 1.6–2.6)
MCHC RBC-ENTMCNC: 29.5 PG — SIGNIFICANT CHANGE UP (ref 27–34)
MCHC RBC-ENTMCNC: 31.4 GM/DL — LOW (ref 32–36)
MCV RBC AUTO: 93.9 FL — SIGNIFICANT CHANGE UP (ref 80–100)
MONOCYTES # BLD AUTO: 1 K/UL — HIGH (ref 0–0.9)
MONOCYTES NFR BLD AUTO: 12.2 % — SIGNIFICANT CHANGE UP (ref 2–14)
NEUTROPHILS # BLD AUTO: 4.2 K/UL — SIGNIFICANT CHANGE UP (ref 1.8–7.4)
NEUTROPHILS NFR BLD AUTO: 51.1 % — SIGNIFICANT CHANGE UP (ref 43–77)
PHOSPHATE SERPL-MCNC: 3.7 MG/DL — SIGNIFICANT CHANGE UP (ref 2.5–4.5)
PLATELET # BLD AUTO: 256 K/UL — SIGNIFICANT CHANGE UP (ref 150–400)
POTASSIUM SERPL-MCNC: 4 MMOL/L — SIGNIFICANT CHANGE UP (ref 3.5–5.3)
POTASSIUM SERPL-SCNC: 4 MMOL/L — SIGNIFICANT CHANGE UP (ref 3.5–5.3)
PROT SERPL-MCNC: 5.8 G/DL — LOW (ref 6–8.3)
RBC # BLD: 3.67 M/UL — LOW (ref 3.8–5.2)
RBC # FLD: 13.5 % — SIGNIFICANT CHANGE UP (ref 10.3–14.5)
SODIUM SERPL-SCNC: 143 MMOL/L — SIGNIFICANT CHANGE UP (ref 135–145)
WBC # BLD: 8.2 K/UL — SIGNIFICANT CHANGE UP (ref 3.8–10.5)
WBC # FLD AUTO: 8.2 K/UL — SIGNIFICANT CHANGE UP (ref 3.8–10.5)

## 2018-12-13 PROCEDURE — 93306 TTE W/DOPPLER COMPLETE: CPT | Mod: 26

## 2018-12-13 RX ADMIN — ERGOCALCIFEROL 50000 UNIT(S): 1.25 CAPSULE ORAL at 12:51

## 2018-12-13 RX ADMIN — AMPICILLIN SODIUM AND SULBACTAM SODIUM 100 GRAM(S): 250; 125 INJECTION, POWDER, FOR SUSPENSION INTRAMUSCULAR; INTRAVENOUS at 12:50

## 2018-12-13 RX ADMIN — ENOXAPARIN SODIUM 40 MILLIGRAM(S): 100 INJECTION SUBCUTANEOUS at 12:51

## 2018-12-13 RX ADMIN — Medication 650 MILLIGRAM(S): at 23:44

## 2018-12-13 RX ADMIN — AMPICILLIN SODIUM AND SULBACTAM SODIUM 100 GRAM(S): 250; 125 INJECTION, POWDER, FOR SUSPENSION INTRAMUSCULAR; INTRAVENOUS at 05:22

## 2018-12-13 RX ADMIN — LISINOPRIL 40 MILLIGRAM(S): 2.5 TABLET ORAL at 05:22

## 2018-12-13 RX ADMIN — Medication 650 MILLIGRAM(S): at 10:40

## 2018-12-13 RX ADMIN — AMPICILLIN SODIUM AND SULBACTAM SODIUM 100 GRAM(S): 250; 125 INJECTION, POWDER, FOR SUSPENSION INTRAMUSCULAR; INTRAVENOUS at 17:45

## 2018-12-13 RX ADMIN — Medication 650 MILLIGRAM(S): at 09:47

## 2018-12-13 RX ADMIN — AMPICILLIN SODIUM AND SULBACTAM SODIUM 100 GRAM(S): 250; 125 INJECTION, POWDER, FOR SUSPENSION INTRAMUSCULAR; INTRAVENOUS at 23:43

## 2018-12-13 NOTE — DISCHARGE NOTE ADULT - PLAN OF CARE
Complete antibiotic course You presented with cellulitis. You received IV Unasyn while in the hospital. Your blood culture; negative. You are recommended to take ceftin 500 BID for 6 more days and follow up with PCP.   Your Doppler lower extremity was negative for DVT. Your Urine culture was negative but UA was positive. YOu are recommended to take Ceftin 500 mg for 6 more days and follow up with PCP You are recommended to take medication as advised, take DASH Diet and follow up with PCP You have Left Lower leg venous stasis ulcer with cellulitis   xerosis, Onychomycosis, PVD/Venous Insufficiency. Podiatry followed you while in patient and performed excisional debridement of devitalized tissues with scalpel to and including subcutaneous level left LE ulcers. Cleaned wound with normal saline, applied medi-honey to all ulcers with dry compressive bandaging. Also, Performed aseptic debridement of all toenails without complications. You are recommended to elevate bilateral LE and off load bilateral heel and ulcer sites. You are recommended to follow up with PCP You presented with cellulitis. You received IV Unasyn while in the hospital. Your blood culture; negative. You are recommended to take Ceftin 500 BID for 6 more days and follow up with PCP.   Your Doppler lower extremity was negative for DVT. You have Left Lower leg venous stasis ulcer with cellulitis   xerosis, Onychomycosis, PVD/Venous Insufficiency. Podiatry followed you while in patient and performed excisional debridement of devitalized tissues with scalpel to and including subcutaneous level left LE ulcers. Cleaned wound with normal saline, applied medi-honey to all ulcers with dry compressive bandaging. Also, Performed aseptic debridement of all toenails without complications. You are recommended to elevate bilateral LE and off load bilateral heel and ulcer sites. You are recommended to follow up with PCP.   You are also recommended to continue with your home Lasix dose for swelling

## 2018-12-13 NOTE — PROGRESS NOTE ADULT - PROBLEM SELECTOR PLAN 5
IMPROVE VTE Individual Risk Assessment    RISK                                                                Points    [  ] Previous VTE                                                  3  [  ] Thrombophilia                                               2  [  ] Lower limb paralysis                                      2        (unable to hold up >15 seconds)    [  ] Current Cancer                                              2         (within 6 months)  [ x ] Immobilization > 24 hrs                                1  [  ] ICU/CCU stay > 24 hours                              1  [ x ] Age > 60                                                      1    IMPROVE VTE Score 2, on lovenox for DVT PPx  No indication for GI PPx
IMPROVE VTE Individual Risk Assessment    RISK                                                                Points    [  ] Previous VTE                                                  3  [  ] Thrombophilia                                               2  [  ] Lower limb paralysis                                      2        (unable to hold up >15 seconds)    [  ] Current Cancer                                              2         (within 6 months)  [ x ] Immobilization > 24 hrs                                1  [  ] ICU/CCU stay > 24 hours                              1  [ x ] Age > 60                                                      1    IMPROVE VTE Score 2, on lovenox for DVT PPx  No indication for GI PPx

## 2018-12-13 NOTE — DISCHARGE NOTE ADULT - MEDICATION SUMMARY - MEDICATIONS TO TAKE
I will START or STAY ON the medications listed below when I get home from the hospital:    traMADol 50 mg oral tablet  -- 0.5 tab(s) by mouth every 8 hours, As needed, Severe Pain (7 - 10) MDD:1.5 mg per day  -- Indication: For Severe pain     acetaminophen 325 mg oral tablet  -- 2 tab(s) by mouth every 6 hours, As needed, Mild Pain (1 - 3), Moderate Pain (4 - 6)  -- Indication: For Mild- Moderate Pain     benazepril 40 mg oral tablet  -- 1 tab(s) by mouth once a day  -- Indication: For ACEI     cefuroxime 500 mg oral tablet  -- 1 tab(s) by mouth 2 times a day   -- Finish all this medication unless otherwise directed by prescriber.  Medication should be taken with plenty of water.  Take with food or milk.    -- Indication: For Cellulitis and UTI     Lasix 40 mg oral tablet  -- 1 tab(s) by mouth once a day  -- Indication: For edema     ergocalciferol 50,000 intl units (1.25 mg) oral capsule  -- 1 cap(s) by mouth once a week  -- Indication: For Vit D

## 2018-12-13 NOTE — PROGRESS NOTE ADULT - PROBLEM SELECTOR PLAN 2
Chronic venous insuff  Podiatry consulted
Chronic venous insuff  Podiatry consult: debridement done and dressing placed

## 2018-12-13 NOTE — PROGRESS NOTE ADULT - PROBLEM SELECTOR PLAN 1
-c/w : Unasyn   - Ceftin 500 mg BID for 6 days on DC as patient also has UTI   Tylenol for mild to mod pain'  Tramadol for severe pain
-c/w : Unasyn   - Ceftin 500 mg BID for 6 days on DC as patient also has UTI   Tylenol for mild to mod pain'  Tramadol for severe pain

## 2018-12-13 NOTE — PROGRESS NOTE ADULT - PROBLEM SELECTOR PLAN 4
C/W BP medications  c/w lisinopril home dose  BP WNL upon evaluation  Continue to monitor
C/W BP medications  c/w lisinopril home dose  BP WNL upon evaluation  Continue to monitor

## 2018-12-13 NOTE — DISCHARGE NOTE ADULT - PATIENT PORTAL LINK FT
You can access the [facility] Portal, offered by [facility], by registering with the following website: [email or web address]

## 2018-12-13 NOTE — DISCHARGE NOTE ADULT - HOSPITAL COURSE
89F, from home with HHA X6hrs, walks with walker PMHx of HTN, chronic leg swelling, disc herniation  presented to ED c/o wound in lt. leg. She states she has been having swelling of B/L leg since many years and usually gets worse during summer. She noticed it got sore 1 month ago and developed blister 3 wks ago, then 1 week ago, it bursted and she was taking ciprofloxacin since friday with wound care at home. She says she had all cardiac work up done for leg swelling and was normal. She was on lasix but is non-complaint. Denies insect bite, burn, shortness of breath, chest pain, cough, N/V/D, fever, no urinary complaints.    ED course: Vitals stable  Labs significant for grossly positive UA  Radiological findings venous doppler shows There is subcutaneous edema in the calf, No evidence of left lower extremity deep venous thrombosis. Xray tibia and fibula shows Edema in most of the lower leg and ankle. No bone destruction or fracture is evident.  Antimicrobial- vanco 1 dose 89F, from home with HHA X6hrs, walks with walker PMHx of HTN, chronic leg swelling, disc herniation  presented to ED c/o wound in lt. leg. She states she has been having swelling of B/L leg since many years and usually gets worse during summer. She noticed it got sore 1 month ago and developed blister 3 wks ago, then 1 week ago, it bursted and she was taking ciprofloxacin since friday with wound care at home. She says she had all cardiac work up done for leg swelling and was normal. She was on lasix but is non-complaint. Denies insect bite, burn, shortness of breath, chest pain, cough, N/V/D, fever, no urinary complaints.    ED course: Vitals stable  Labs significant for grossly positive UA  Radiological findings venous doppler shows There is subcutaneous edema in the calf, No evidence of left lower extremity deep venous thrombosis. Xray tibia and fibula shows Edema in most of the lower leg and ankle. No bone destruction or fracture is evident.  Antimicrobial- vanco 1 dose    She is admitted for cellulitis and work up for chronic B/L leg swelling.     For Cellulitis, c/w : IV Unasyn, blood culture: negative.  Ceftin 500 mg BID for 6 days on DC as patient also has UTI, Tylenol for mild to mod pain, Tramadol for severe pain.     For Leg swelling, Chronic venous insuff, Podiatry consulted: debridement done on bedside.      For  UTI (urinary tract infection). Ucul: negative, c/w Unasyn, - ceftin 500 mg BID for 6 more days on Dc.      For  Hypertension.  Plan: C/W BP medications, c/w lisinopril home dose, BP WNL upon evaluation  Continue to monitor.       Patient is medically stable to be discharged,   Case is discussed with the attending

## 2018-12-13 NOTE — DISCHARGE NOTE ADULT - CARE PROVIDERS DIRECT ADDRESSES
[email or web address]

## 2018-12-13 NOTE — DISCHARGE NOTE ADULT - CARE PROVIDER_API CALL
Cher Washburn (), Internal Medicine  [address]  [address] [place] [address]  Phone: [phone]  Fax: [phone]    Pal Chadwick), Internal Medicine  [address] [place] [address]  Phone: [phone]  Fax: [phone]

## 2018-12-13 NOTE — DISCHARGE NOTE ADULT - CARE PLAN
Principal Discharge DX:	Cellulitis  Goal:	Complete antibiotic course  Assessment and plan of treatment:	You presented with cellulitis. You received IV Unasyn while in the hospital. Your blood culture; negative. You are recommended to take ceftin 500 BID for 6 more days and follow up with PCP.   Your Doppler lower extremity was negative for DVT.  Secondary Diagnosis:	UTI (urinary tract infection)  Assessment and plan of treatment:	Your Urine culture was negative but UA was positive. YOu are recommended to take Ceftin 500 mg for 6 more days and follow up with PCP  Secondary Diagnosis:	Hypertension  Assessment and plan of treatment:	You are recommended to take medication as advised, take DASH Diet and follow up with PCP  Secondary Diagnosis:	Leg swelling  Assessment and plan of treatment:	You have Left Lower leg venous stasis ulcer with cellulitis   xerosis, Onychomycosis, PVD/Venous Insufficiency. Podiatry followed you while in patient and performed excisional debridement of devitalized tissues with scalpel to and including subcutaneous level left LE ulcers. Cleaned wound with normal saline, applied medi-honey to all ulcers with dry compressive bandaging. Also, Performed aseptic debridement of all toenails without complications. You are recommended to elevate bilateral LE and off load bilateral heel and ulcer sites. You are recommended to follow up with PCP Principal Discharge DX:	Cellulitis  Goal:	Complete antibiotic course  Assessment and plan of treatment:	You presented with cellulitis. You received IV Unasyn while in the hospital. Your blood culture; negative. You are recommended to take Ceftin 500 BID for 6 more days and follow up with PCP.   Your Doppler lower extremity was negative for DVT.  Secondary Diagnosis:	UTI (urinary tract infection)  Assessment and plan of treatment:	Your Urine culture was negative but UA was positive. YOu are recommended to take Ceftin 500 mg for 6 more days and follow up with PCP  Secondary Diagnosis:	Hypertension  Assessment and plan of treatment:	You are recommended to take medication as advised, take DASH Diet and follow up with PCP  Secondary Diagnosis:	Leg swelling  Assessment and plan of treatment:	You have Left Lower leg venous stasis ulcer with cellulitis   xerosis, Onychomycosis, PVD/Venous Insufficiency. Podiatry followed you while in patient and performed excisional debridement of devitalized tissues with scalpel to and including subcutaneous level left LE ulcers. Cleaned wound with normal saline, applied medi-honey to all ulcers with dry compressive bandaging. Also, Performed aseptic debridement of all toenails without complications. You are recommended to elevate bilateral LE and off load bilateral heel and ulcer sites. You are recommended to follow up with PCP.   You are also recommended to continue with your home Lasix dose for swelling

## 2018-12-13 NOTE — PROGRESS NOTE ADULT - PROBLEM SELECTOR PLAN 3
Ucul: pending   c/w Unasyn   - ceftin 500 mg BID for 6 more days on Dc
Ucul: pending   c/w Unasyn   - ceftin 500 mg BID for 6 more days on Dc

## 2018-12-14 VITALS — SYSTOLIC BLOOD PRESSURE: 126 MMHG | HEART RATE: 55 BPM | DIASTOLIC BLOOD PRESSURE: 67 MMHG

## 2018-12-14 PROCEDURE — 84100 ASSAY OF PHOSPHORUS: CPT

## 2018-12-14 PROCEDURE — 82607 VITAMIN B-12: CPT

## 2018-12-14 PROCEDURE — 93971 EXTREMITY STUDY: CPT

## 2018-12-14 PROCEDURE — 93306 TTE W/DOPPLER COMPLETE: CPT

## 2018-12-14 PROCEDURE — 83735 ASSAY OF MAGNESIUM: CPT

## 2018-12-14 PROCEDURE — 82746 ASSAY OF FOLIC ACID SERUM: CPT

## 2018-12-14 PROCEDURE — 85730 THROMBOPLASTIN TIME PARTIAL: CPT

## 2018-12-14 PROCEDURE — 87040 BLOOD CULTURE FOR BACTERIA: CPT

## 2018-12-14 PROCEDURE — 97116 GAIT TRAINING THERAPY: CPT

## 2018-12-14 PROCEDURE — 82962 GLUCOSE BLOOD TEST: CPT

## 2018-12-14 PROCEDURE — 83036 HEMOGLOBIN GLYCOSYLATED A1C: CPT

## 2018-12-14 PROCEDURE — 73590 X-RAY EXAM OF LOWER LEG: CPT

## 2018-12-14 PROCEDURE — 82306 VITAMIN D 25 HYDROXY: CPT

## 2018-12-14 PROCEDURE — 80053 COMPREHEN METABOLIC PANEL: CPT

## 2018-12-14 PROCEDURE — 81001 URINALYSIS AUTO W/SCOPE: CPT

## 2018-12-14 PROCEDURE — 71045 X-RAY EXAM CHEST 1 VIEW: CPT

## 2018-12-14 PROCEDURE — 84443 ASSAY THYROID STIM HORMONE: CPT

## 2018-12-14 PROCEDURE — 97110 THERAPEUTIC EXERCISES: CPT

## 2018-12-14 PROCEDURE — 87086 URINE CULTURE/COLONY COUNT: CPT

## 2018-12-14 PROCEDURE — 93005 ELECTROCARDIOGRAM TRACING: CPT

## 2018-12-14 PROCEDURE — 83605 ASSAY OF LACTIC ACID: CPT

## 2018-12-14 PROCEDURE — 85027 COMPLETE CBC AUTOMATED: CPT

## 2018-12-14 PROCEDURE — 85610 PROTHROMBIN TIME: CPT

## 2018-12-14 PROCEDURE — 80061 LIPID PANEL: CPT

## 2018-12-14 PROCEDURE — 99285 EMERGENCY DEPT VISIT HI MDM: CPT | Mod: 25

## 2018-12-14 PROCEDURE — 97162 PT EVAL MOD COMPLEX 30 MIN: CPT

## 2018-12-14 PROCEDURE — 80048 BASIC METABOLIC PNL TOTAL CA: CPT

## 2018-12-14 RX ORDER — FUROSEMIDE 40 MG
40 TABLET ORAL DAILY
Qty: 0 | Refills: 0 | Status: DISCONTINUED | OUTPATIENT
Start: 2018-12-14 | End: 2018-12-14

## 2018-12-14 RX ORDER — ACETAMINOPHEN 500 MG
2 TABLET ORAL
Qty: 0 | Refills: 0 | COMMUNITY
Start: 2018-12-14

## 2018-12-14 RX ORDER — TRAMADOL HYDROCHLORIDE 50 MG/1
0.5 TABLET ORAL
Qty: 0 | Refills: 0 | COMMUNITY
Start: 2018-12-14

## 2018-12-14 RX ORDER — TRAMADOL HYDROCHLORIDE 50 MG/1
0.5 TABLET ORAL
Qty: 15 | Refills: 0
Start: 2018-12-14 | End: 2018-12-23

## 2018-12-14 RX ORDER — CEFUROXIME AXETIL 250 MG
1 TABLET ORAL
Qty: 12 | Refills: 0 | OUTPATIENT
Start: 2018-12-14 | End: 2018-12-19

## 2018-12-14 RX ORDER — ACETAMINOPHEN 500 MG
2 TABLET ORAL
Qty: 240 | Refills: 0
Start: 2018-12-14 | End: 2019-01-12

## 2018-12-14 RX ORDER — ERGOCALCIFEROL 1.25 MG/1
1 CAPSULE ORAL
Qty: 6 | Refills: 0
Start: 2018-12-14 | End: 2019-01-27

## 2018-12-14 RX ORDER — ERGOCALCIFEROL 1.25 MG/1
1 CAPSULE ORAL
Qty: 6 | Refills: 0 | OUTPATIENT
Start: 2018-12-14 | End: 2019-01-27

## 2018-12-14 RX ORDER — FUROSEMIDE 40 MG
1 TABLET ORAL
Qty: 0 | Refills: 0 | COMMUNITY

## 2018-12-14 RX ORDER — CEFUROXIME AXETIL 250 MG
1 TABLET ORAL
Qty: 12 | Refills: 0
Start: 2018-12-14 | End: 2018-12-19

## 2018-12-14 RX ORDER — FUROSEMIDE 40 MG
1.5 TABLET ORAL
Qty: 0 | Refills: 0 | DISCHARGE
Start: 2018-12-14 | End: 2019-01-12

## 2018-12-14 RX ORDER — FUROSEMIDE 40 MG
1 TABLET ORAL
Qty: 30 | Refills: 0
Start: 2018-12-14 | End: 2019-01-12

## 2018-12-14 RX ORDER — MOXIFLOXACIN HYDROCHLORIDE TABLETS, 400 MG 400 MG/1
1 TABLET, FILM COATED ORAL
Qty: 0 | Refills: 0 | COMMUNITY

## 2018-12-14 RX ADMIN — AMPICILLIN SODIUM AND SULBACTAM SODIUM 100 GRAM(S): 250; 125 INJECTION, POWDER, FOR SUSPENSION INTRAMUSCULAR; INTRAVENOUS at 11:48

## 2018-12-14 RX ADMIN — Medication 650 MILLIGRAM(S): at 00:45

## 2018-12-14 RX ADMIN — Medication 40 MILLIGRAM(S): at 11:48

## 2018-12-14 RX ADMIN — ENOXAPARIN SODIUM 40 MILLIGRAM(S): 100 INJECTION SUBCUTANEOUS at 11:48

## 2018-12-14 RX ADMIN — AMPICILLIN SODIUM AND SULBACTAM SODIUM 100 GRAM(S): 250; 125 INJECTION, POWDER, FOR SUSPENSION INTRAMUSCULAR; INTRAVENOUS at 05:59

## 2018-12-14 RX ADMIN — LISINOPRIL 40 MILLIGRAM(S): 2.5 TABLET ORAL at 05:59

## 2018-12-14 NOTE — PROGRESS NOTE ADULT - ASSESSMENT
89F, from home with HHA X6hrs, walks with walker PMHx of HTN, chronic leg swelling, disc herniation  presented to ED c/o wound in lt. leg with chronic B/L leg swelling.    She is admitted for cellulitis and work up for chronic B/L leg swelling.
89F, from home with HHA X6hrs, walks with walker PMHx of HTN, chronic leg swelling, disc herniation  presented to ED c/o wound in lt. leg with chronic B/L leg swelling.    She is admitted for cellulitis and work up for chronic B/L leg swelling. blood culture negative, urine culture negative, improved, resume diuretics, D/C home today with VNS for wound care, po ABS for few days    Problem/Plan - 1:  ·  Problem: Cellulitis.  Plan: -c/w : Unasyn   - Ceftin 500 mg BID for 6 days on DC as patient also has UTI   Tylenol for mild to mod pain'  Tramadol for severe pain.     Problem/Plan - 2:  ·  Problem: Leg swelling.  Plan: Chronic venous insuff  Podiatry consulted.     Problem/Plan - 3:  ·  Problem: UTI (urinary tract infection).  Plan: Ucul: pending   c/w Unasyn   - ceftin 500 mg BID for 6 more days on Dc.     Problem/Plan - 4:  ·  Problem: Hypertension.  Plan: C/W BP medications  c/w lisinopril home dose  BP WNL upon evaluation  Continue to monitor.     Problem/Plan - 5:  ·  Problem: Prophylactic measure.  Plan: IMPROVE VTE Individual Risk Assessment
Left Lower leg venous stasis ulcer with cellulitis   xerosis, Onychomycosis, PVD/VI  H/O HTN, chronic leg swelling, disc herniation      Examined patient, reviewed patient's chart  Discusses treatment plans with patient   s/p excisional debridement of devitalized tissues with scalpel to and including subcutaneous level left LE ulcers  Cleaned wound with normal saline, applied medi-honey to all ulcers with dry compressive bandaging  Continue elevation of bilateral LE and off load bilateral heel and ulcer sites
89 yr old female from home H/OHTN/edema leg/chronic back [place]/disc disorder, patient admit hospital for B/L leg pain/swelling and redness worsening, start IV ABS follow blood culture urine culture B/L leg venous doppler negative for DVT, PT eval fall precaution.
89F, from home with HHA X6hrs, walks with walker PMHx of HTN, chronic leg swelling, disc herniation  presented to ED c/o wound in lt. leg with chronic B/L leg swelling.    She is admitted for cellulitis and work up for chronic B/L leg swelling.    Dc tomorrow as patient family refused to take her today. Also her HHA is coming tomorrow

## 2018-12-14 NOTE — PROGRESS NOTE ADULT - SUBJECTIVE AND OBJECTIVE BOX
88 y/o female seen at bedside in NAD for follow up left lower leg venous stasis ulcer with cellulitis. A&Ox3.  Admitted with left lower leg venous stasis ulcer with cellulitis.  Daily wound care with medi-honey    Vital Signs Last 24 Hrs  T(C): 37.7 (13 Dec 2018 14:52), Max: 37.7 (13 Dec 2018 14:52)  T(F): 99.8 (13 Dec 2018 14:52), Max: 99.8 (13 Dec 2018 14:52)  HR: 60 (13 Dec 2018 14:52) (58 - 76)  BP: 124/58 (13 Dec 2018 14:52) (124/58 - 143/64)  BP(mean): --  RR: 18 (13 Dec 2018 14:52) (18 - 18)  SpO2: 97% (13 Dec 2018 14:52) (96% - 98%)    Derm: Skin is warm and dry, scaly skin bilaterally with chronic venous stasis changes and hyperpigmentation bilateral lower leg  Vascular: palpable DP 0/4, PT 0/4 B/L pulses , with biphasic doppler sounds. 2+ pitting edema bilateral LE  Left lateral lower leg ulcer measures: 10.0 cm x 5.5 cm x 0.1 cm with fibro-granular base with cellulitis no undermining, no active drainage, no active drainage, no purulent.   Neuro: Protective sensation wnl bilateral foot  M/S Decreased medial arch bilateral, dorsally contracted digits bilateral    LABS/DIAGNOSTIC TESTS                        10.8   8.2   )-----------( 256      ( 13 Dec 2018 06:38 )             34.5     12-13    143  |  111<H>  |  21<H>  ----------------------------<  92  4.0   |  25  |  0.94    Ca    8.4      13 Dec 2018 06:38  Phos  3.7     12-13  Mg     2.3     12-13    TPro  5.8<L>  /  Alb  2.4<L>  /  TBili  0.5  /  DBili  x   /  AST  20  /  ALT  18  /  AlkPhos  61  12-13
Patient is a 89y old  Female who presents with a chief complaint of cellulitis (13 Dec 2018 17:48)/infected left foot ulcer/S/P debrigement      INTERVAL HPI/OVERNIGHT EVENTS:  T(C): 36.7 (12-14-18 @ 05:31), Max: 37.7 (12-13-18 @ 14:52)  HR: 60 (12-14-18 @ 05:31) (60 - 71)  BP: 138/89 (12-14-18 @ 05:31) (124/58 - 150/81)  RR: 18 (12-14-18 @ 05:31) (18 - 18)  SpO2: 99% (12-14-18 @ 05:31) (97% - 100%)  Wt(kg): --    LABS:                        10.8   8.2   )-----------( 256      ( 13 Dec 2018 06:38 )             34.5     12-13    143  |  111<H>  |  21<H>  ----------------------------<  92  4.0   |  25  |  0.94    Ca    8.4      13 Dec 2018 06:38  Phos  3.7     12-13  Mg     2.3     12-13    TPro  5.8<L>  /  Alb  2.4<L>  /  TBili  0.5  /  DBili  x   /  AST  20  /  ALT  18  /  AlkPhos  61  12-13        CAPILLARY BLOOD GLUCOSE            RADIOLOGY & ADDITIONAL TESTS:  < from: Transthoracic Echocardiogram (12.13.18 @ 07:02) >  CONCLUSIONS:  1. Mild posterior mitral annular calcification. No mitral  regurgitation is noted.  2. Aortic valve not well visualized. No aortic stenosis. No  aortic valve regurgitation seen.  3. Normal aortic root.  4. Left atrium not well visualized, probably normal.  5. Normal left ventricular internal dimensions and wall  thicknesses.  6. Endocardium not well visualized; likely moderately  reduced left ventricular systolic function.  7. Grade I diastolic dysfunction (Impaired relaxation).  8. Right ventricle not well visualized. Normal RV systolic  function (TAPSE 3.4 cm).  9. RV systolic pressure is normal at  21 mm Hg.  10. Tricuspid valve not well seen. Trace tricuspid  regurgitation.  11. No pericardial effusion.      < end of copied text >    Consultant(s) Notes Reviewed:  [x ] YES  [ ] NO    PHYSICAL EXAM:  GENERAL: well built, well nourished  HEAD:  Atraumatic, Normocephalic  EYES: EOMI, PERRLA, conjunctiva and sclera clear  ENT: No tonsillar erythema, exudates, or enlargement; Moist mucous membranes, Good dentition, No lesions  NECK: Supple, No JVD, Normal thyroid, no enlarged nodes  NERVOUS SYSTEM:  Alert & Oriented X3, Good concentration; Motor Strength 5/5 B/L upper and lower extremities; DTRs 2+ intact and symmetric, sensory intact  CHEST/LUNG: B/L good air entry; No rales, rhonchi, or wheezing  HEART: S1S2 normal, no S3, Regular rate and rhythm; No murmurs, rubs, or gallops  ABDOMEN: Soft, Nontender, Nondistended; Bowel sounds present  EXTREMITIES:  2+ Peripheral Pulses, No clubbing, cyanosis, positive edema  LYMPH: No lymphadenopathy noted  SKIN: redness/warm on touch improved, ulcer    Care Discussed with Consultants/Other Providers [ x] YES  [ ] NO
PGY 1 Note discussed with supervising resident and primary attending    Patient is a 89y old  Female who presents with a chief complaint of cellulitis (13 Dec 2018 09:28)      INTERVAL HPI/OVERNIGHT EVENTS:   Patient is seen at the bedside. No  new complains   patient is stable for discharge   Also spoke to the family at bedside. They will take the patient tomorrow afternoon     MEDICATIONS  (STANDING):  ampicillin/sulbactam  IVPB      ampicillin/sulbactam  IVPB 1.5 Gram(s) IV Intermittent every 6 hours  enoxaparin Injectable 40 milliGRAM(s) SubCutaneous daily  ergocalciferol 92478 Unit(s) Oral <User Schedule>  influenza   Vaccine 0.5 milliLiter(s) IntraMuscular once  lisinopril 40 milliGRAM(s) Oral daily    MEDICATIONS  (PRN):  acetaminophen   Tablet .. 650 milliGRAM(s) Oral every 6 hours PRN Mild Pain (1 - 3), Moderate Pain (4 - 6)  traMADol 25 milliGRAM(s) Oral every 8 hours PRN Severe Pain (7 - 10)      __________________________________________________  REVIEW OF SYSTEMS:    CONSTITUTIONAL: No fever,   EYES: no acute visual disturbances  NECK: No pain or stiffness  RESPIRATORY: No cough; No shortness of breath  CARDIOVASCULAR: No chest pain, no palpitations  GASTROINTESTINAL: No pain. No nausea or vomiting; No diarrhea   NEUROLOGICAL: No headache or numbness, no tremors  MUSCULOSKELETAL: No joint pain, no muscle pain  GENITOURINARY: no dysuria, no frequency, no hesitancy  PSYCHIATRY: no depression , no anxiety  ALL OTHER  ROS negative        Vital Signs Last 24 Hrs  T(C): 37.7 (13 Dec 2018 14:52), Max: 37.7 (13 Dec 2018 14:52)  T(F): 99.8 (13 Dec 2018 14:52), Max: 99.8 (13 Dec 2018 14:52)  HR: 60 (13 Dec 2018 14:52) (58 - 76)  BP: 124/58 (13 Dec 2018 14:52) (124/58 - 143/64)  BP(mean): --  RR: 18 (13 Dec 2018 14:52) (18 - 18)  SpO2: 97% (13 Dec 2018 14:52) (96% - 98%)    ________________________________________________  PHYSICAL EXAM:  GENERAL: NAD  HEENT: Normocephalic;  conjunctivae and sclerae clear; moist mucous membranes;   NECK : supple  CHEST/LUNG: Clear to auscultation bilaterally with good air entry   HEART: S1 S2  regular; no murmurs, gallops or rubs  ABDOMEN: Soft, Nontender, Nondistended; Bowel sounds present  EXTREMITIES: no cyanosis; no edema; no calf tenderness  SKIN: warm and dry; no rash  NERVOUS SYSTEM:  Awake and alert;  _________________________________________________  LABS:                        10.8   8.2   )-----------( 256      ( 13 Dec 2018 06:38 )             34.5     12-13    143  |  111<H>  |  21<H>  ----------------------------<  92  4.0   |  25  |  0.94    Ca    8.4      13 Dec 2018 06:38  Phos  3.7     12-13  Mg     2.3     12-13    TPro  5.8<L>  /  Alb  2.4<L>  /  TBili  0.5  /  DBili  x   /  AST  20  /  ALT  18  /  AlkPhos  61  12-13        CAPILLARY BLOOD GLUCOSE            RADIOLOGY & ADDITIONAL TESTS:    Imaging Personally Reviewed:  YES/    Consultant(s) Notes Reviewed:   YES/     Care Discussed with Consultants :     Plan of care was discussed with patient and /or primary care giver; all questions and concerns were addressed and care was aligned with patient's wishes.
Patient is a 89y old  Female who presents with a chief complaint of cellulitis (12 Dec 2018 11:37)/leg pain/UTI      INTERVAL HPI/OVERNIGHT EVENTS:  T(C): 36.9 (18 @ 05:20), Max: 36.9 (18 @ 05:20)  HR: 81 (18 @ 05:20) (73 - 103)  BP: 136/90 (18 @ 05:20) (106/69 - 164/81)  RR: 18 (18 @ 05:20) (15 - 18)  SpO2: 95% (18 @ 05:20) (95% - 98%)  Wt(kg): --    LABS:                        11.8   8.3   )-----------( 323      ( 12 Dec 2018 07:02 )             37.3     12    141  |  110<H>  |  27<H>  ----------------------------<  90  4.3   |  22  |  0.92    Ca    8.7      12 Dec 2018 07:02  Phos  3.4       Mg     2.4         TPro  7.4  /  Alb  3.1<L>  /  TBili  0.4  /  DBili  x   /  AST  25  /  ALT  23  /  AlkPhos  82  12-11    PT/INR - ( 11 Dec 2018 14:07 )   PT: 12.5 sec;   INR: 1.12 ratio         PTT - ( 11 Dec 2018 14:07 )  PTT:29.4 sec  Urinalysis Basic - ( 11 Dec 2018 15:52 )    Color: Yellow / Appearance: Clear / S.025 / pH: x  Gluc: x / Ketone: Trace  / Bili: Negative / Urobili: Negative   Blood: x / Protein: 30 mg/dL / Nitrite: Negative   Leuk Esterase: Moderate / RBC: 10-25 /HPF / WBC 26-50 /HPF   Sq Epi: x / Non Sq Epi: Moderate /HPF / Bacteria: Moderate /HPF      CAPILLARY BLOOD GLUCOSE      POCT Blood Glucose.: 91 mg/dL (11 Dec 2018 13:01)        RADIOLOGY & ADDITIONAL TESTS:  < from: US Duplex Venous Lower Ext Ltd, Left (18 @ 17:13) >  IMPRESSION:     No evidence of left lower extremity deep venous thrombosis.    < end of copied text >    Consultant(s) Notes Reviewed:  [x ] YES  [ ] NO    PHYSICAL EXAM:  GENERAL: well built, well nourished  HEAD:  Atraumatic, Normocephalic  EYES: EOMI, PERRLA, conjunctiva and sclera clear  ENT: No tonsillar erythema, exudates, or enlargement; Moist mucous membranes, Good dentition, No lesions  NECK: Supple, No JVD, Normal thyroid, no enlarged nodes  NERVOUS SYSTEM:  Alert & Oriented X3, Good concentration; Motor Strength 5/5 B/L upper and lower extremities; DTRs 2+ intact and symmetric, sensory intact  CHEST/LUNG: B/L good air entry; No rales, rhonchi, or wheezing  HEART: S1S2 normal, no S3, Regular rate and rhythm; No murmurs, rubs, or gallops  ABDOMEN: Soft, Nontender, Nondistended; Bowel sounds present  EXTREMITIES:  2+ Peripheral Pulses, No clubbing, cyanosis, positive edema  LYMPH: No lymphadenopathy noted  SKIN: B/L leg redness warm on touch    Care Discussed with Consultants/Other Providers [ x] YES  [ ] NO
PGY 1 Note discussed with supervising resident and primary attending    Patient is a 89y old  Female who presents with a chief complaint of cellulitis (13 Dec 2018 09:28)      INTERVAL HPI/OVERNIGHT EVENTS:  seen at the bedside. No new complains     MEDICATIONS  (STANDING):  ampicillin/sulbactam  IVPB      ampicillin/sulbactam  IVPB 1.5 Gram(s) IV Intermittent every 6 hours  enoxaparin Injectable 40 milliGRAM(s) SubCutaneous daily  ergocalciferol 12900 Unit(s) Oral <User Schedule>  influenza   Vaccine 0.5 milliLiter(s) IntraMuscular once  lisinopril 40 milliGRAM(s) Oral daily    MEDICATIONS  (PRN):  acetaminophen   Tablet .. 650 milliGRAM(s) Oral every 6 hours PRN Mild Pain (1 - 3), Moderate Pain (4 - 6)  traMADol 25 milliGRAM(s) Oral every 8 hours PRN Severe Pain (7 - 10)      __________________________________________________  REVIEW OF SYSTEMS:    CONSTITUTIONAL: No fever,   EYES: no acute visual disturbances  NECK: No pain or stiffness  RESPIRATORY: No cough; No shortness of breath  CARDIOVASCULAR: No chest pain, no palpitations  GASTROINTESTINAL: No pain. No nausea or vomiting; No diarrhea   NEUROLOGICAL: No headache or numbness, no tremors  MUSCULOSKELETAL: No joint pain, no muscle pain  GENITOURINARY: no dysuria, no frequency, no hesitancy  PSYCHIATRY: no depression , no anxiety  ALL OTHER  ROS negative        Vital Signs Last 24 Hrs  T(C): 36.6 (13 Dec 2018 05:24), Max: 37.4 (12 Dec 2018 21:24)  T(F): 97.9 (13 Dec 2018 05:24), Max: 99.3 (12 Dec 2018 21:24)  HR: 58 (13 Dec 2018 05:24) (58 - 77)  BP: 132/59 (13 Dec 2018 05:24) (119/54 - 143/64)  BP(mean): 75 (12 Dec 2018 11:48) (75 - 75)  RR: 18 (13 Dec 2018 05:24) (17 - 18)  SpO2: 96% (13 Dec 2018 05:24) (95% - 98%)    ________________________________________________  PHYSICAL EXAM:  GENERAL: NAD  HEENT: Normocephalic;  conjunctivae and sclerae clear; moist mucous membranes;   NECK : supple  CHEST/LUNG: Clear to auscultation bilaterally with good air entry   HEART: S1 S2  regular; no murmurs, gallops or rubs  ABDOMEN: Soft, Nontender, Nondistended; Bowel sounds present  EXTREMITIES: no cyanosis; no edema; no calf tenderness  SKIN: warm and dry; no rash  NERVOUS SYSTEM:  Awake and alert; Oriented  to place, person and time ; no new deficits    _________________________________________________      CAPILLARY BLOOD GLUCOSE            RADIOLOGY & ADDITIONAL TESTS:    Imaging Personally Reviewed:  YES/NO    Consultant(s) Notes Reviewed:   YES/ No    Care Discussed with Consultants :     Plan of care was discussed with patient and /or primary care giver; all questions and concerns were addressed and care was aligned with patient's wishes.

## 2018-12-16 LAB
CULTURE RESULTS: SIGNIFICANT CHANGE UP
CULTURE RESULTS: SIGNIFICANT CHANGE UP
SPECIMEN SOURCE: SIGNIFICANT CHANGE UP
SPECIMEN SOURCE: SIGNIFICANT CHANGE UP

## 2019-03-20 NOTE — ED PROVIDER NOTE - SCRIBE NAME
3/20/2019      [facility] [employer], ON-BOARDED.             Outreach ,  Zeb Russell      
Etta Hood

## 2019-07-17 NOTE — PHYSICAL THERAPY INITIAL EVALUATION ADULT - TRANSFER SKILLS, REHAB EVAL
July 17, 2019      Snehal Reynoso MD  [address]  [place] [address]           [place] [facility] [address] [facility] [place] [address]  Phone: [phone]          Patient: Zonia Skaggs   MR Number: [id number]   YOB: 1977   Date of Visit: 7/17/2019       Dear Dr. Snehal Reynoso:    Thank you for referring Zonia Skaggs to me for evaluation. Attached you will find relevant portions of my assessment and plan of care.    If you have questions, please do not hesitate to call me. I look forward to following Zonia Skaggs along with you.    Sincerely,    Bandar Madden MD    Enclosure  CC:  No Recipients    If you would like to receive this communication electronically, please contact [email or web address] or [phone] to request more information on Twitter Link access.    For providers and/or their staff who would like to refer a patient to [facility], please contact us through our one-stop-shop provider referral line, Malina Colon, at [phone].    If you feel you have received this communication in error or would no longer like to receive these types of communications, please e-mail [email or web address]         
needs device/independent

## 2019-12-23 ENCOUNTER — INPATIENT (INPATIENT)
Facility: HOSPITAL | Age: 84
LOS: 3 days | Discharge: NURSING, REHAB OR LONG-TERM CARE | DRG: 565 | End: 2019-12-27
Attending: STUDENT IN AN ORGANIZED HEALTH CARE EDUCATION/TRAINING PROGRAM | Admitting: STUDENT IN AN ORGANIZED HEALTH CARE EDUCATION/TRAINING PROGRAM
Payer: MEDICARE

## 2019-12-23 VITALS
RESPIRATION RATE: 20 BRPM | OXYGEN SATURATION: 93 % | WEIGHT: 179.9 LBS | DIASTOLIC BLOOD PRESSURE: 92 MMHG | HEART RATE: 89 BPM | SYSTOLIC BLOOD PRESSURE: 170 MMHG | HEIGHT: 63 IN | TEMPERATURE: 98 F

## 2019-12-23 PROCEDURE — 70486 CT MAXILLOFACIAL W/O DYE: CPT | Mod: 26

## 2019-12-23 PROCEDURE — 70450 CT HEAD/BRAIN W/O DYE: CPT | Mod: 26

## 2019-12-23 PROCEDURE — 73110 X-RAY EXAM OF WRIST: CPT | Mod: 26,LT

## 2019-12-23 NOTE — ED ADULT NURSE NOTE - OBJECTIVE STATEMENT
As per EMS pt slipped off the chair.hitting head c/o pain and swelling to left wrist. Discoloration noted to left side of nose and left upper lip.

## 2019-12-23 NOTE — ED ADULT NURSE NOTE - ED STAT RN HANDOFF DETAILS
pt.remained   stable.  denies pain. on tele box M with NSR.transfer to holding area.report given to mojgan colin.no acute distress noted

## 2019-12-23 NOTE — ED ADULT NURSE NOTE - NSIMPLEMENTINTERV_GEN_ALL_ED
Implemented All Fall Risk Interventions:  [facility] to call system. Call bell, personal items and telephone within reach. Instruct patient to call for assistance. Room bathroom lighting operational. Non-slip footwear when patient is off stretcher. Physically safe environment: no spills, clutter or unnecessary equipment. Stretcher in lowest position, wheels locked, appropriate side rails in place. Provide visual cue, wrist band, yellow gown, etc. Monitor gait and stability. Monitor for mental status changes and reorient to person, place, and time. Review medications for side effects contributing to fall risk. Reinforce activity limits and safety measures with patient and family.

## 2019-12-23 NOTE — ED ADULT TRIAGE NOTE - CHIEF COMPLAINT QUOTE
As per EMS pt slipped off the chair, denies dizziness, hitting head or loc compliant of pain and swelling to left wrist. Discoloration noted to left side of nose and left upper lip.  Pt stated she was looking out the window.

## 2019-12-24 DIAGNOSIS — R29.6 REPEATED FALLS: ICD-10-CM

## 2019-12-24 DIAGNOSIS — M62.82 RHABDOMYOLYSIS: ICD-10-CM

## 2019-12-24 DIAGNOSIS — S52.502A UNSPECIFIED FRACTURE OF THE LOWER END OF LEFT RADIUS, INITIAL ENCOUNTER FOR CLOSED FRACTURE: ICD-10-CM

## 2019-12-24 DIAGNOSIS — I21.4 NON-ST ELEVATION (NSTEMI) MYOCARDIAL INFARCTION: ICD-10-CM

## 2019-12-24 DIAGNOSIS — Z29.9 ENCOUNTER FOR PROPHYLACTIC MEASURES, UNSPECIFIED: ICD-10-CM

## 2019-12-24 DIAGNOSIS — I10 ESSENTIAL (PRIMARY) HYPERTENSION: ICD-10-CM

## 2019-12-24 DIAGNOSIS — Z71.89 OTHER SPECIFIED COUNSELING: ICD-10-CM

## 2019-12-24 DIAGNOSIS — I50.9 HEART FAILURE, UNSPECIFIED: ICD-10-CM

## 2019-12-24 PROBLEM — M79.89 OTHER SPECIFIED SOFT TISSUE DISORDERS: Chronic | Status: ACTIVE | Noted: 2018-12-11

## 2019-12-24 LAB
24R-OH-CALCIDIOL SERPL-MCNC: 19.9 NG/ML — LOW (ref 30–80)
ALBUMIN SERPL ELPH-MCNC: 2.9 G/DL — LOW (ref 3.5–5)
ALBUMIN SERPL ELPH-MCNC: 3.2 G/DL — LOW (ref 3.5–5)
ALP SERPL-CCNC: 72 U/L — SIGNIFICANT CHANGE UP (ref 40–120)
ALP SERPL-CCNC: 80 U/L — SIGNIFICANT CHANGE UP (ref 40–120)
ALT FLD-CCNC: 24 U/L DA — SIGNIFICANT CHANGE UP (ref 10–60)
ALT FLD-CCNC: 24 U/L DA — SIGNIFICANT CHANGE UP (ref 10–60)
ANION GAP SERPL CALC-SCNC: 6 MMOL/L — SIGNIFICANT CHANGE UP (ref 5–17)
ANION GAP SERPL CALC-SCNC: 8 MMOL/L — SIGNIFICANT CHANGE UP (ref 5–17)
APPEARANCE UR: ABNORMAL
APTT BLD: 28.6 SEC — SIGNIFICANT CHANGE UP (ref 27.5–36.3)
AST SERPL-CCNC: 41 U/L — HIGH (ref 10–40)
AST SERPL-CCNC: 48 U/L — HIGH (ref 10–40)
BASOPHILS # BLD AUTO: 0.03 K/UL — SIGNIFICANT CHANGE UP (ref 0–0.2)
BASOPHILS # BLD AUTO: 0.06 K/UL — SIGNIFICANT CHANGE UP (ref 0–0.2)
BASOPHILS NFR BLD AUTO: 0.4 % — SIGNIFICANT CHANGE UP (ref 0–2)
BASOPHILS NFR BLD AUTO: 0.6 % — SIGNIFICANT CHANGE UP (ref 0–2)
BILIRUB SERPL-MCNC: 0.7 MG/DL — SIGNIFICANT CHANGE UP (ref 0.2–1.2)
BILIRUB SERPL-MCNC: 0.8 MG/DL — SIGNIFICANT CHANGE UP (ref 0.2–1.2)
BILIRUB UR-MCNC: NEGATIVE — SIGNIFICANT CHANGE UP
BUN SERPL-MCNC: 29 MG/DL — HIGH (ref 7–18)
BUN SERPL-MCNC: 32 MG/DL — HIGH (ref 7–18)
CALCIUM SERPL-MCNC: 8.6 MG/DL — SIGNIFICANT CHANGE UP (ref 8.4–10.5)
CALCIUM SERPL-MCNC: 8.9 MG/DL — SIGNIFICANT CHANGE UP (ref 8.4–10.5)
CHLORIDE SERPL-SCNC: 108 MMOL/L — SIGNIFICANT CHANGE UP (ref 96–108)
CHLORIDE SERPL-SCNC: 110 MMOL/L — HIGH (ref 96–108)
CHOLEST SERPL-MCNC: 172 MG/DL — SIGNIFICANT CHANGE UP (ref 10–199)
CK MB BLD-MCNC: 0.8 % — SIGNIFICANT CHANGE UP (ref 0–3.5)
CK MB BLD-MCNC: 0.8 % — SIGNIFICANT CHANGE UP (ref 0–3.5)
CK MB CFR SERPL CALC: 5.6 NG/ML — HIGH (ref 0–3.6)
CK MB CFR SERPL CALC: 7.2 NG/ML — HIGH (ref 0–3.6)
CK SERPL-CCNC: 1085 U/L — HIGH (ref 21–215)
CK SERPL-CCNC: 739 U/L — HIGH (ref 21–215)
CK SERPL-CCNC: 841 U/L — HIGH (ref 21–215)
CK SERPL-CCNC: 920 U/L — HIGH (ref 21–215)
CO2 SERPL-SCNC: 26 MMOL/L — SIGNIFICANT CHANGE UP (ref 22–31)
CO2 SERPL-SCNC: 28 MMOL/L — SIGNIFICANT CHANGE UP (ref 22–31)
COLOR SPEC: YELLOW — SIGNIFICANT CHANGE UP
CREAT SERPL-MCNC: 0.94 MG/DL — SIGNIFICANT CHANGE UP (ref 0.5–1.3)
CREAT SERPL-MCNC: 0.95 MG/DL — SIGNIFICANT CHANGE UP (ref 0.5–1.3)
DIFF PNL FLD: ABNORMAL
EOSINOPHIL # BLD AUTO: 0.01 K/UL — SIGNIFICANT CHANGE UP (ref 0–0.5)
EOSINOPHIL # BLD AUTO: 0.08 K/UL — SIGNIFICANT CHANGE UP (ref 0–0.5)
EOSINOPHIL NFR BLD AUTO: 0.1 % — SIGNIFICANT CHANGE UP (ref 0–6)
EOSINOPHIL NFR BLD AUTO: 1.1 % — SIGNIFICANT CHANGE UP (ref 0–6)
FOLATE SERPL-MCNC: 14.7 NG/ML — SIGNIFICANT CHANGE UP
GLUCOSE SERPL-MCNC: 104 MG/DL — HIGH (ref 70–99)
GLUCOSE SERPL-MCNC: 155 MG/DL — HIGH (ref 70–99)
GLUCOSE UR QL: NEGATIVE — SIGNIFICANT CHANGE UP
HBA1C BLD-MCNC: 5.8 % — HIGH (ref 4–5.6)
HCT VFR BLD CALC: 38.3 % — SIGNIFICANT CHANGE UP (ref 34.5–45)
HCT VFR BLD CALC: 38.9 % — SIGNIFICANT CHANGE UP (ref 34.5–45)
HDLC SERPL-MCNC: 62 MG/DL — SIGNIFICANT CHANGE UP
HGB BLD-MCNC: 12.3 G/DL — SIGNIFICANT CHANGE UP (ref 11.5–15.5)
HGB BLD-MCNC: 12.8 G/DL — SIGNIFICANT CHANGE UP (ref 11.5–15.5)
IMM GRANULOCYTES NFR BLD AUTO: 0.3 % — SIGNIFICANT CHANGE UP (ref 0–1.5)
IMM GRANULOCYTES NFR BLD AUTO: 0.6 % — SIGNIFICANT CHANGE UP (ref 0–1.5)
INR BLD: 1.17 RATIO — HIGH (ref 0.88–1.16)
KETONES UR-MCNC: ABNORMAL
LACTATE SERPL-SCNC: 1.5 MMOL/L — SIGNIFICANT CHANGE UP (ref 0.7–2)
LEUKOCYTE ESTERASE UR-ACNC: ABNORMAL
LIPID PNL WITH DIRECT LDL SERPL: 96 MG/DL — SIGNIFICANT CHANGE UP
LYMPHOCYTES # BLD AUTO: 1.61 K/UL — SIGNIFICANT CHANGE UP (ref 1–3.3)
LYMPHOCYTES # BLD AUTO: 1.62 K/UL — SIGNIFICANT CHANGE UP (ref 1–3.3)
LYMPHOCYTES # BLD AUTO: 17.4 % — SIGNIFICANT CHANGE UP (ref 13–44)
LYMPHOCYTES # BLD AUTO: 22.5 % — SIGNIFICANT CHANGE UP (ref 13–44)
MAGNESIUM SERPL-MCNC: 2.3 MG/DL — SIGNIFICANT CHANGE UP (ref 1.6–2.6)
MCHC RBC-ENTMCNC: 29.9 PG — SIGNIFICANT CHANGE UP (ref 27–34)
MCHC RBC-ENTMCNC: 30.2 PG — SIGNIFICANT CHANGE UP (ref 27–34)
MCHC RBC-ENTMCNC: 32.1 GM/DL — SIGNIFICANT CHANGE UP (ref 32–36)
MCHC RBC-ENTMCNC: 32.9 GM/DL — SIGNIFICANT CHANGE UP (ref 32–36)
MCV RBC AUTO: 91.7 FL — SIGNIFICANT CHANGE UP (ref 80–100)
MCV RBC AUTO: 93 FL — SIGNIFICANT CHANGE UP (ref 80–100)
MONOCYTES # BLD AUTO: 0.67 K/UL — SIGNIFICANT CHANGE UP (ref 0–0.9)
MONOCYTES # BLD AUTO: 0.88 K/UL — SIGNIFICANT CHANGE UP (ref 0–0.9)
MONOCYTES NFR BLD AUTO: 9.3 % — SIGNIFICANT CHANGE UP (ref 2–14)
MONOCYTES NFR BLD AUTO: 9.5 % — SIGNIFICANT CHANGE UP (ref 2–14)
NEUTROPHILS # BLD AUTO: 4.77 K/UL — SIGNIFICANT CHANGE UP (ref 1.8–7.4)
NEUTROPHILS # BLD AUTO: 6.65 K/UL — SIGNIFICANT CHANGE UP (ref 1.8–7.4)
NEUTROPHILS NFR BLD AUTO: 66.1 % — SIGNIFICANT CHANGE UP (ref 43–77)
NEUTROPHILS NFR BLD AUTO: 72.1 % — SIGNIFICANT CHANGE UP (ref 43–77)
NITRITE UR-MCNC: POSITIVE
NRBC # BLD: 0 /100 WBCS — SIGNIFICANT CHANGE UP (ref 0–0)
NRBC # BLD: 0 /100 WBCS — SIGNIFICANT CHANGE UP (ref 0–0)
NT-PROBNP SERPL-SCNC: 1954 PG/ML — HIGH (ref 0–450)
PH UR: 5 — SIGNIFICANT CHANGE UP (ref 5–8)
PHOSPHATE SERPL-MCNC: 3.7 MG/DL — SIGNIFICANT CHANGE UP (ref 2.5–4.5)
PLATELET # BLD AUTO: 265 K/UL — SIGNIFICANT CHANGE UP (ref 150–400)
PLATELET # BLD AUTO: 291 K/UL — SIGNIFICANT CHANGE UP (ref 150–400)
POTASSIUM SERPL-MCNC: 3.8 MMOL/L — SIGNIFICANT CHANGE UP (ref 3.5–5.3)
POTASSIUM SERPL-MCNC: 4.1 MMOL/L — SIGNIFICANT CHANGE UP (ref 3.5–5.3)
POTASSIUM SERPL-SCNC: 3.8 MMOL/L — SIGNIFICANT CHANGE UP (ref 3.5–5.3)
POTASSIUM SERPL-SCNC: 4.1 MMOL/L — SIGNIFICANT CHANGE UP (ref 3.5–5.3)
PROT SERPL-MCNC: 6.2 G/DL — SIGNIFICANT CHANGE UP (ref 6–8.3)
PROT SERPL-MCNC: 6.8 G/DL — SIGNIFICANT CHANGE UP (ref 6–8.3)
PROT UR-MCNC: 15
PROTHROM AB SERPL-ACNC: 13 SEC — HIGH (ref 10–12.9)
RBC # BLD: 4.12 M/UL — SIGNIFICANT CHANGE UP (ref 3.8–5.2)
RBC # BLD: 4.24 M/UL — SIGNIFICANT CHANGE UP (ref 3.8–5.2)
RBC # FLD: 14.5 % — SIGNIFICANT CHANGE UP (ref 10.3–14.5)
RBC # FLD: 14.6 % — HIGH (ref 10.3–14.5)
SODIUM SERPL-SCNC: 142 MMOL/L — SIGNIFICANT CHANGE UP (ref 135–145)
SODIUM SERPL-SCNC: 144 MMOL/L — SIGNIFICANT CHANGE UP (ref 135–145)
SP GR SPEC: 1.02 — SIGNIFICANT CHANGE UP (ref 1.01–1.02)
TOTAL CHOLESTEROL/HDL RATIO MEASUREMENT: 2.8 RATIO — LOW (ref 3.3–7.1)
TRIGL SERPL-MCNC: 68 MG/DL — SIGNIFICANT CHANGE UP (ref 10–149)
TROPONIN I SERPL-MCNC: 0.06 NG/ML — HIGH (ref 0–0.04)
TROPONIN I SERPL-MCNC: 0.06 NG/ML — HIGH (ref 0–0.04)
TROPONIN I SERPL-MCNC: 0.07 NG/ML — HIGH (ref 0–0.04)
TSH SERPL-MCNC: 0.58 UU/ML — SIGNIFICANT CHANGE UP (ref 0.34–4.82)
UROBILINOGEN FLD QL: NEGATIVE — SIGNIFICANT CHANGE UP
VIT B12 SERPL-MCNC: 387 PG/ML — SIGNIFICANT CHANGE UP (ref 232–1245)
WBC # BLD: 7.21 K/UL — SIGNIFICANT CHANGE UP (ref 3.8–10.5)
WBC # BLD: 9.24 K/UL — SIGNIFICANT CHANGE UP (ref 3.8–10.5)
WBC # FLD AUTO: 7.21 K/UL — SIGNIFICANT CHANGE UP (ref 3.8–10.5)
WBC # FLD AUTO: 9.24 K/UL — SIGNIFICANT CHANGE UP (ref 3.8–10.5)

## 2019-12-24 PROCEDURE — 99285 EMERGENCY DEPT VISIT HI MDM: CPT | Mod: 25

## 2019-12-24 PROCEDURE — 71045 X-RAY EXAM CHEST 1 VIEW: CPT | Mod: 26

## 2019-12-24 PROCEDURE — 29125 APPL SHORT ARM SPLINT STATIC: CPT

## 2019-12-24 PROCEDURE — 99222 1ST HOSP IP/OBS MODERATE 55: CPT

## 2019-12-24 PROCEDURE — 99223 1ST HOSP IP/OBS HIGH 75: CPT

## 2019-12-24 RX ORDER — SODIUM CHLORIDE 9 MG/ML
1000 INJECTION, SOLUTION INTRAVENOUS
Refills: 0 | Status: DISCONTINUED | OUTPATIENT
Start: 2019-12-24 | End: 2019-12-24

## 2019-12-24 RX ORDER — ESCITALOPRAM OXALATE 10 MG/1
10 TABLET, FILM COATED ORAL DAILY
Refills: 0 | Status: DISCONTINUED | OUTPATIENT
Start: 2019-12-24 | End: 2019-12-27

## 2019-12-24 RX ORDER — HEPARIN SODIUM 5000 [USP'U]/ML
5000 INJECTION INTRAVENOUS; SUBCUTANEOUS EVERY 8 HOURS
Refills: 0 | Status: DISCONTINUED | OUTPATIENT
Start: 2019-12-24 | End: 2019-12-27

## 2019-12-24 RX ORDER — SODIUM CHLORIDE 9 MG/ML
1000 INJECTION INTRAMUSCULAR; INTRAVENOUS; SUBCUTANEOUS
Refills: 0 | Status: DISCONTINUED | OUTPATIENT
Start: 2019-12-24 | End: 2019-12-27

## 2019-12-24 RX ORDER — ACETAMINOPHEN 500 MG
650 TABLET ORAL ONCE
Refills: 0 | Status: COMPLETED | OUTPATIENT
Start: 2019-12-24 | End: 2019-12-24

## 2019-12-24 RX ORDER — OXYCODONE AND ACETAMINOPHEN 5; 325 MG/1; MG/1
1 TABLET ORAL EVERY 6 HOURS
Refills: 0 | Status: DISCONTINUED | OUTPATIENT
Start: 2019-12-24 | End: 2019-12-24

## 2019-12-24 RX ORDER — ASPIRIN/CALCIUM CARB/MAGNESIUM 324 MG
81 TABLET ORAL DAILY
Refills: 0 | Status: DISCONTINUED | OUTPATIENT
Start: 2019-12-24 | End: 2019-12-27

## 2019-12-24 RX ORDER — ACETAMINOPHEN 500 MG
650 TABLET ORAL EVERY 6 HOURS
Refills: 0 | Status: DISCONTINUED | OUTPATIENT
Start: 2019-12-24 | End: 2019-12-27

## 2019-12-24 RX ORDER — ATORVASTATIN CALCIUM 80 MG/1
40 TABLET, FILM COATED ORAL AT BEDTIME
Refills: 0 | Status: DISCONTINUED | OUTPATIENT
Start: 2019-12-24 | End: 2019-12-27

## 2019-12-24 RX ORDER — SODIUM CHLORIDE 9 MG/ML
1000 INJECTION INTRAMUSCULAR; INTRAVENOUS; SUBCUTANEOUS
Refills: 0 | Status: DISCONTINUED | OUTPATIENT
Start: 2019-12-24 | End: 2019-12-24

## 2019-12-24 RX ORDER — LISINOPRIL 2.5 MG/1
40 TABLET ORAL DAILY
Refills: 0 | Status: DISCONTINUED | OUTPATIENT
Start: 2019-12-24 | End: 2019-12-27

## 2019-12-24 RX ORDER — METOPROLOL TARTRATE 50 MG
25 TABLET ORAL
Refills: 0 | Status: DISCONTINUED | OUTPATIENT
Start: 2019-12-24 | End: 2019-12-27

## 2019-12-24 RX ORDER — OXYCODONE AND ACETAMINOPHEN 5; 325 MG/1; MG/1
2 TABLET ORAL EVERY 6 HOURS
Refills: 0 | Status: DISCONTINUED | OUTPATIENT
Start: 2019-12-24 | End: 2019-12-24

## 2019-12-24 RX ORDER — SODIUM CHLORIDE 9 MG/ML
1000 INJECTION INTRAMUSCULAR; INTRAVENOUS; SUBCUTANEOUS ONCE
Refills: 0 | Status: COMPLETED | OUTPATIENT
Start: 2019-12-24 | End: 2019-12-24

## 2019-12-24 RX ORDER — CHOLECALCIFEROL (VITAMIN D3) 125 MCG
1000 CAPSULE ORAL DAILY
Refills: 0 | Status: DISCONTINUED | OUTPATIENT
Start: 2019-12-24 | End: 2019-12-27

## 2019-12-24 RX ORDER — SENNA PLUS 8.6 MG/1
2 TABLET ORAL AT BEDTIME
Refills: 0 | Status: DISCONTINUED | OUTPATIENT
Start: 2019-12-24 | End: 2019-12-27

## 2019-12-24 RX ORDER — FUROSEMIDE 40 MG
60 TABLET ORAL DAILY
Refills: 0 | Status: DISCONTINUED | OUTPATIENT
Start: 2019-12-24 | End: 2019-12-27

## 2019-12-24 RX ORDER — METOPROLOL TARTRATE 50 MG
25 TABLET ORAL
Refills: 0 | Status: DISCONTINUED | OUTPATIENT
Start: 2019-12-24 | End: 2019-12-24

## 2019-12-24 RX ADMIN — HEPARIN SODIUM 5000 UNIT(S): 5000 INJECTION INTRAVENOUS; SUBCUTANEOUS at 22:01

## 2019-12-24 RX ADMIN — HEPARIN SODIUM 5000 UNIT(S): 5000 INJECTION INTRAVENOUS; SUBCUTANEOUS at 06:40

## 2019-12-24 RX ADMIN — SODIUM CHLORIDE 1000 MILLILITER(S): 9 INJECTION INTRAMUSCULAR; INTRAVENOUS; SUBCUTANEOUS at 02:56

## 2019-12-24 RX ADMIN — Medication 650 MILLIGRAM(S): at 01:21

## 2019-12-24 RX ADMIN — Medication 650 MILLIGRAM(S): at 20:31

## 2019-12-24 RX ADMIN — HEPARIN SODIUM 5000 UNIT(S): 5000 INJECTION INTRAVENOUS; SUBCUTANEOUS at 13:02

## 2019-12-24 RX ADMIN — Medication 81 MILLIGRAM(S): at 13:02

## 2019-12-24 RX ADMIN — Medication 650 MILLIGRAM(S): at 04:12

## 2019-12-24 RX ADMIN — Medication 650 MILLIGRAM(S): at 18:42

## 2019-12-24 RX ADMIN — ATORVASTATIN CALCIUM 40 MILLIGRAM(S): 80 TABLET, FILM COATED ORAL at 22:01

## 2019-12-24 RX ADMIN — SENNA PLUS 2 TABLET(S): 8.6 TABLET ORAL at 22:01

## 2019-12-24 RX ADMIN — Medication 25 MILLIGRAM(S): at 06:40

## 2019-12-24 RX ADMIN — OXYCODONE AND ACETAMINOPHEN 2 TABLET(S): 5; 325 TABLET ORAL at 20:30

## 2019-12-24 RX ADMIN — SODIUM CHLORIDE 50 MILLILITER(S): 9 INJECTION INTRAMUSCULAR; INTRAVENOUS; SUBCUTANEOUS at 16:40

## 2019-12-24 RX ADMIN — OXYCODONE AND ACETAMINOPHEN 2 TABLET(S): 5; 325 TABLET ORAL at 21:58

## 2019-12-24 RX ADMIN — SODIUM CHLORIDE 100 MILLILITER(S): 9 INJECTION INTRAMUSCULAR; INTRAVENOUS; SUBCUTANEOUS at 01:20

## 2019-12-24 NOTE — H&P ADULT - PROBLEM SELECTOR PLAN 7
IMPROVE VTE Individual Risk Assessment    RISK                                                                Points    [  ] Previous VTE                                                  3    [  ] Thrombophilia                                               2    [  ] Lower limb paralysis                                      2        (unable to hold up >15 seconds)      [  ] Current Cancer                                              2         (within 6 months)  [X] Immobilization > 24 hrs                                1  [  ] ICU/CCU stay > 24 hours                              1    [X] Age > 60                                                      1    IMPROVE VTE Score _____2____  c/w Heparin SC 5000 q8

## 2019-12-24 NOTE — H&P ADULT - PROBLEM SELECTOR PLAN 3
orthopedic - p/w mechanical fall  - Xray left wrist : Radius and ulna distal fracture  - fall precautions   - c/w pain meds Tylenol and Oxycodone  - will start on Senna   - f/u PT  ** Ortho consulted

## 2019-12-24 NOTE — H&P ADULT - ATTENDING COMMENTS
Patient seen and examined ; case was discussed with the admitting resident    ROS: as in the HPI; all other ROS negative    SH and family history as above    Vital Signs Last 24 Hrs  T(C): 36.5 (23 Dec 2019 20:27), Max: 36.5 (23 Dec 2019 20:27)  T(F): 97.7 (23 Dec 2019 20:27), Max: 97.7 (23 Dec 2019 20:27)  HR: 89 (23 Dec 2019 20:27) (89 - 89)  BP: 170/92 (23 Dec 2019 20:27) (170/92 - 170/92)  BP(mean): --  RR: 20 (23 Dec 2019 20:27) (20 - 20)  SpO2: 93% (23 Dec 2019 20:27) (93% - 93%)    GEN: NAD  HEENT- normocephalic; mouth dry, perioral hematoma   CVS- S1S2+  LUNGS- clear to auscultation; no wheezing  ABD: Soft , nontender, nondistended, Bowel sounds are present  EXTREMITY: no calf tenderness, no cyanosis, no edema, lue with sensation intact, good cap refill   NEURO: AAOx3; non focal neurologic exam; cranial nerves grossly intact  PSYCH: normal affect and behavior  BACK: no swelling or mass;   VASCULAR: ++ distal peripheral pulses  SKIN: warm and dry.       Labs Reviewed:                         12.8   9.24  )-----------( 291      ( 24 Dec 2019 00:38 )             38.9     12-24    142  |  108  |  32<H>  ----------------------------<  104<H>  4.1   |  26  |  0.95    Ca    8.9      24 Dec 2019 00:38    TPro  6.8  /  Alb  3.2<L>  /  TBili  0.7  /  DBili  x   /  AST  48<H>  /  ALT  24  /  AlkPhos  80  12-24    CARDIAC MARKERS ( 24 Dec 2019 00:38 )  0.060 ng/mL / x     / 1085 U/L / x     / x            PT/INR - ( 24 Dec 2019 00:38 )   PT: 13.0 sec;   INR: 1.17 ratio         PTT - ( 24 Dec 2019 00:38 )  PTT:28.6 sec  BNP:   MEDICATIONS  (STANDING):  sodium chloride 0.9%. 1000 milliLiter(s) (100 mL/Hr) IV Continuous <Continuous>    CXR reviewed  EKG Reviewed- LBBB    Prev hospitalizations reviewed       89 y/o F with HFrEF, chronic venous stasis, HTN recurrent falls admitted with mechanical fall and L radius and ulna fracture. Denies antecedent symptoms, chest pain, dyspnea and has otherwise been in good health. Lasix was just increased and her LE edema has improved.     1. NSTEMI- asa, statin, bb, suspect demand ischemia, denies chest pain. Eval echo, monitor tele, trend troponin, appreciate cardiology recommendations.   2. L wrist, ulna and radius fx- appreciate orthopedic consultation  3. Recurrent falls- check b12, tsh, vit D, PT consult, fall precautions   4. Chronic sys CHF- not on bb for unclear reason, will start, cont ACE-i renal fn at baseline, reeval ef with echo as above   5. Rhabdomyolysis- mild, given one liter in ED patient dry on exam, will hold further given chronic CHF.     Plan of care discussed with patient ;  all questions and concerns were addressed.  Discussed with Patient's family, son at bedside Patient seen and examined ; case was discussed with the admitting resident    ROS: as in the HPI; all other ROS negative  Fam hx negative for heart disease   SH and family history as above    Vital Signs Last 24 Hrs  T(C): 36.5 (23 Dec 2019 20:27), Max: 36.5 (23 Dec 2019 20:27)  T(F): 97.7 (23 Dec 2019 20:27), Max: 97.7 (23 Dec 2019 20:27)  HR: 89 (23 Dec 2019 20:27) (89 - 89)  BP: 170/92 (23 Dec 2019 20:27) (170/92 - 170/92)  BP(mean): --  RR: 20 (23 Dec 2019 20:27) (20 - 20)  SpO2: 93% (23 Dec 2019 20:27) (93% - 93%)    GEN: NAD  HEENT- normocephalic; mouth dry, perioral hematoma   CVS- S1S2+  LUNGS- clear to auscultation; no wheezing  ABD: Soft , nontender, nondistended, Bowel sounds are present  EXTREMITY: no calf tenderness, no cyanosis, no edema, lue with sensation intact, good cap refill   NEURO: AAOx3; non focal neurologic exam; cranial nerves grossly intact  PSYCH: normal affect and behavior  BACK: no swelling or mass;   VASCULAR: ++ distal peripheral pulses  SKIN: warm and dry.       Labs Reviewed:                         12.8   9.24  )-----------( 291      ( 24 Dec 2019 00:38 )             38.9     12-24    142  |  108  |  32<H>  ----------------------------<  104<H>  4.1   |  26  |  0.95    Ca    8.9      24 Dec 2019 00:38    TPro  6.8  /  Alb  3.2<L>  /  TBili  0.7  /  DBili  x   /  AST  48<H>  /  ALT  24  /  AlkPhos  80  12-24    CARDIAC MARKERS ( 24 Dec 2019 00:38 )  0.060 ng/mL / x     / 1085 U/L / x     / x            PT/INR - ( 24 Dec 2019 00:38 )   PT: 13.0 sec;   INR: 1.17 ratio         PTT - ( 24 Dec 2019 00:38 )  PTT:28.6 sec  BNP:   MEDICATIONS  (STANDING):  sodium chloride 0.9%. 1000 milliLiter(s) (100 mL/Hr) IV Continuous <Continuous>    CXR reviewed  EKG Reviewed- LBBB    Prev hospitalizations reviewed       89 y/o F with HFrEF, chronic venous stasis, HTN recurrent falls admitted with mechanical fall and L radius and ulna fracture. Denies antecedent symptoms, chest pain, dyspnea and has otherwise been in good health. Lasix was just increased and her LE edema has improved.     1. NSTEMI- asa, statin, bb, suspect demand ischemia, denies chest pain. Eval echo, monitor tele, trend troponin, appreciate cardiology recommendations.   2. L wrist, ulna and radius fx- appreciate orthopedic consultation  3. Recurrent falls- check b12, tsh, vit D, PT consult, fall precautions   4. Chronic sys CHF- not on bb for unclear reason, will start, cont ACE-i renal fn at baseline, reeval ef with echo as above   5. Rhabdomyolysis- mild, given one liter in ED patient dry on exam, will hold further given chronic CHF.   6. Recurrent falls   Plan of care discussed with patient ;  all questions and concerns were addressed.  Discussed with Patient's family, son at bedside

## 2019-12-24 NOTE — H&P ADULT - PROBLEM SELECTOR PLAN 1
demand ischemia demand ischemia  denies chest pain - Patient denies chest pain  - EKG : Sinus rhythm with PACs with LBBB . No ST-T wave changes appreciated    - Troponin I 1 0.06; f/u T2 , T3   - likely demand ischemia  - Heart score: 4  - MARINA score: 2  - c/w ASA 81 mg, beta blocker and High dose statin   - Follow up TSH, HbA1C, Lipid profile, TTE  - On Tele  ** Cardiologist Consulted Dr. Barber

## 2019-12-24 NOTE — CONSULT NOTE ADULT - PROBLEM SELECTOR RECOMMENDATION 9
keep in splint at all times  non weight bearing to left hand/wrist  no lifting  keep elevated  ICE prn for pain/swelling  follow up with Dr. Kristen Guzman (Orthopedic Hand Surgeon) in 1 week  call for an appointment [phone]

## 2019-12-24 NOTE — CONSULT NOTE ADULT - SUBJECTIVE AND OBJECTIVE BOX
CHIEF COMPLAINT: Fall    HPI: 89 yo F with HFrEF (40-45% by echo 12/2018) and HTN who presented after a fall. Patient    PAST MEDICAL & SURGICAL HISTORY:  As above, also Anxiety, chronic venous insufficiency, disc herniation  No significant past surgical history      Allergies    No Known Allergies    MEDICATIONS  (STANDING):  aspirin  chewable 81 milliGRAM(s) Oral daily  atorvastatin 40 milliGRAM(s) Oral at bedtime  escitalopram 10 milliGRAM(s) Oral daily  furosemide    Tablet 60 milliGRAM(s) Oral daily  heparin  Injectable 5000 Unit(s) SubCutaneous every 8 hours  lisinopril 40 milliGRAM(s) Oral daily  metoprolol tartrate 25 milliGRAM(s) Oral two times a day  senna 2 Tablet(s) Oral at bedtime    MEDICATIONS  (PRN):  acetaminophen   Tablet .. 650 milliGRAM(s) Oral every 6 hours PRN Mild Pain (1 - 3)  oxycodone    5 mG/acetaminophen 325 mG 1 Tablet(s) Oral every 6 hours PRN Moderate Pain (4 - 6)  oxycodone    5 mG/acetaminophen 325 mG 2 Tablet(s) Oral every 6 hours PRN Severe Pain (7 - 10)      FAMILY HISTORY:    No family history of premature coronary artery disease or sudden cardiac death    SOCIAL HISTORY:  Smoking-  Alcohol-  Illicit Drug use-    REVIEW OF SYSTEMS:  Constitutional: [ ] fever, [ ]weight loss,  [ ]fatigue  Eyes: [ ] visual changes  Respiratory: [ ]shortness of breath;  [ ] cough, [ ]wheezing, [ ]chills, [ ]hemoptysis  Cardiovascular: [ ] chest pain, [ ]palpitations, [ ]dizziness,  [ ]leg swelling [ ]syncope  Gastrointestinal: [ ] abdominal pain, [ ]nausea, [ ]vomiting,  [ ]diarrhea   Genitourinary: [ ] dysuria, [ ] hematuria  Neurologic: [ ] headaches [ ] tremors  [ ] weakness [ ] lightheadedness  Skin: [ ] itching, [ ]burning, [ ] rashes  Endocrine: [ ] heat or cold intolerance  Musculoskeletal: [ ] joint pain or swelling; [ ] muscle, back, or extremity pain  Psychiatric: [ ] depression, [ ]anxiety, [ ]mood swings, or [ ]difficulty sleeping  Hematologic: [ ] easy bruising, [ ] bleeding gums       [ x] All others negative	  [ ] Unable to obtain    Vital Signs Last 24 Hrs  T(C): 36.4 (24 Dec 2019 08:19), Max: 37.3 (24 Dec 2019 04:35)  T(F): 97.5 (24 Dec 2019 08:19), Max: 99.2 (24 Dec 2019 04:35)  HR: 62 (24 Dec 2019 08:19) (62 - 89)  BP: 127/65 (24 Dec 2019 08:19) (121/52 - 170/92)  BP(mean): --  RR: 18 (24 Dec 2019 08:19) (18 - 20)  SpO2: 98% (24 Dec 2019 08:19) (93% - 98%)  I&O's Summary      PHYSICAL EXAM:  General: No acute distress  HEENT: EOMI, PERRL  Neck: Supple, No JVD  Lungs: Clear to auscultation bilaterally; No rales or wheezing  Heart: Regular rate and rhythm; No murmurs, rubs, or gallops  Abdomen: Nontender, bowel sounds present  Extremities: No clubbing, cyanosis, or edema  Nervous system:  Alert & Oriented X3, no focal deficits  Psychiatric: Normal affect  Skin: No rashes or lesions      LABS:  12-24    144  |  110<H>  |  29<H>  ----------------------------<  155<H>  3.8   |  28  |  0.94    Ca    8.6      24 Dec 2019 10:22  Phos  3.7     12-24  Mg     2.3     12-24    TPro  6.2  /  Alb  2.9<L>  /  TBili  0.8  /  DBili  x   /  AST  41<H>  /  ALT  24  /  AlkPhos  72  12-24    Creatinine Trend: 0.94<--, 0.95<--                        12.3   7.21  )-----------( 265      ( 24 Dec 2019 10:22 )             38.3     PT/INR - ( 24 Dec 2019 00:38 )   PT: 13.0 sec;   INR: 1.17 ratio         PTT - ( 24 Dec 2019 00:38 )  PTT:28.6 sec    Lipid Panel: Cholesterol, Serum 172  Direct LDL 96  HDL Cholesterol, Serum 62  Triglycerides, Serum 68    Cardiac Enzymes: CARDIAC MARKERS ( 24 Dec 2019 10:22 )  x     / x     / 841 U/L / x     / x      CARDIAC MARKERS ( 24 Dec 2019 06:44 )  0.074 ng/mL / x     / 920 U/L / x     / 7.2 ng/mL  CARDIAC MARKERS ( 24 Dec 2019 00:38 )  0.060 ng/mL / x     / 1085 U/L / x     / x        Serum Pro-Brain Natriuretic Peptide: 1954 pg/mL (12-24-19 @ 10:22)    RADIOLOGY:   < from: CT Head No Cont (12.23.19 @ 21:39) >  CT head: No acute intracranial hemorrhage, mass effect, or acute territorial infarction.    CT maxillofacial: No acute facial bone fractures. Right maxillary sinusitis.    < end of copied text >    < from: Xray Chest 1 View-PORTABLE IMMEDIATE (12.24.19 @ 00:57) >  IMPRESSION: Cardiomegaly with clear lungs.    < end of copied text >    ECG [my interpretation]:    TELEMETRY:    ECHO: < from: Transthoracic Echocardiogram (12.13.18 @ 07:02) >  CONCLUSIONS:  1. Mild posterior mitral annular calcification. No mitral  regurgitation is noted.  2. Aortic valve not well visualized. No aortic stenosis. No  aortic valve regurgitation seen.  3. Normal aortic root.  4. Left atrium not well visualized, probably normal.  5. Normal left ventricular internal dimensions and wall  thicknesses.  6. Endocardium not well visualized; likely moderately  reduced left ventricular systolic function.  7. Grade I diastolic dysfunction (Impaired relaxation).  8. Right ventricle not well visualized. Normal RV systolic  function (TAPSE 3.4 cm).  9. RV systolic pressure is normal at  21 mm Hg.  10. Tricuspid valve not well seen. Trace tricuspid  regurgitation.  11. No pericardial effusion.    *** No previous Echo exam.    < end of copied text > CHIEF COMPLAINT: Fall    HPI: 89 yo F with HFrEF (40-45% by echo 12/2018) and HTN who presented after a fall. Patient    PAST MEDICAL & SURGICAL HISTORY:  As above, also Anxiety, chronic venous insufficiency, disc herniation  No significant past surgical history      Allergies    No Known Allergies    MEDICATIONS  (STANDING):  aspirin  chewable 81 milliGRAM(s) Oral daily  atorvastatin 40 milliGRAM(s) Oral at bedtime  escitalopram 10 milliGRAM(s) Oral daily  furosemide    Tablet 60 milliGRAM(s) Oral daily  heparin  Injectable 5000 Unit(s) SubCutaneous every 8 hours  lisinopril 40 milliGRAM(s) Oral daily  metoprolol tartrate 25 milliGRAM(s) Oral two times a day  senna 2 Tablet(s) Oral at bedtime    MEDICATIONS  (PRN):  acetaminophen   Tablet .. 650 milliGRAM(s) Oral every 6 hours PRN Mild Pain (1 - 3)  oxycodone    5 mG/acetaminophen 325 mG 1 Tablet(s) Oral every 6 hours PRN Moderate Pain (4 - 6)  oxycodone    5 mG/acetaminophen 325 mG 2 Tablet(s) Oral every 6 hours PRN Severe Pain (7 - 10)      FAMILY HISTORY:    No family history of premature coronary artery disease or sudden cardiac death    SOCIAL HISTORY:  Smoking-  Alcohol-  Illicit Drug use-    REVIEW OF SYSTEMS:  Constitutional: [ ] fever, [ ]weight loss,  [ ]fatigue  Eyes: [ ] visual changes  Respiratory: [ ]shortness of breath;  [ ] cough, [ ]wheezing, [ ]chills, [ ]hemoptysis  Cardiovascular: [ ] chest pain, [ ]palpitations, [ ]dizziness,  [ ]leg swelling [ ]syncope  Gastrointestinal: [ ] abdominal pain, [ ]nausea, [ ]vomiting,  [ ]diarrhea   Genitourinary: [ ] dysuria, [ ] hematuria  Neurologic: [ ] headaches [ ] tremors  [ ] weakness [ ] lightheadedness  Skin: [ ] itching, [ ]burning, [ ] rashes  Endocrine: [ ] heat or cold intolerance  Musculoskeletal: [ ] joint pain or swelling; [ ] muscle, back, or extremity pain  Psychiatric: [ ] depression, [ ]anxiety, [ ]mood swings, or [ ]difficulty sleeping  Hematologic: [ ] easy bruising, [ ] bleeding gums       [ x] All others negative	  [ ] Unable to obtain    Vital Signs Last 24 Hrs  T(C): 36.4 (24 Dec 2019 08:19), Max: 37.3 (24 Dec 2019 04:35)  T(F): 97.5 (24 Dec 2019 08:19), Max: 99.2 (24 Dec 2019 04:35)  HR: 62 (24 Dec 2019 08:19) (62 - 89)  BP: 127/65 (24 Dec 2019 08:19) (121/52 - 170/92)  BP(mean): --  RR: 18 (24 Dec 2019 08:19) (18 - 20)  SpO2: 98% (24 Dec 2019 08:19) (93% - 98%)  I&O's Summary      PHYSICAL EXAM:  General: No acute distress  HEENT: EOMI, PERRL  Neck: Supple, No JVD  Lungs: Clear to auscultation bilaterally; No rales or wheezing  Heart: Regular rate and rhythm; No murmurs, rubs, or gallops  Abdomen: Nontender, bowel sounds present  Extremities: No clubbing, cyanosis, or edema  Nervous system:  Alert & Oriented X3, no focal deficits  Psychiatric: Normal affect  Skin: No rashes or lesions      LABS:  12-24    144  |  110<H>  |  29<H>  ----------------------------<  155<H>  3.8   |  28  |  0.94    Ca    8.6      24 Dec 2019 10:22  Phos  3.7     12-24  Mg     2.3     12-24    TPro  6.2  /  Alb  2.9<L>  /  TBili  0.8  /  DBili  x   /  AST  41<H>  /  ALT  24  /  AlkPhos  72  12-24    Creatinine Trend: 0.94<--, 0.95<--                        12.3   7.21  )-----------( 265      ( 24 Dec 2019 10:22 )             38.3     PT/INR - ( 24 Dec 2019 00:38 )   PT: 13.0 sec;   INR: 1.17 ratio         PTT - ( 24 Dec 2019 00:38 )  PTT:28.6 sec    Lipid Panel: Cholesterol, Serum 172  Direct LDL 96  HDL Cholesterol, Serum 62  Triglycerides, Serum 68    Cardiac Enzymes: CARDIAC MARKERS ( 24 Dec 2019 10:22 )  x     / x     / 841 U/L / x     / x      CARDIAC MARKERS ( 24 Dec 2019 06:44 )  0.074 ng/mL / x     / 920 U/L / x     / 7.2 ng/mL  CARDIAC MARKERS ( 24 Dec 2019 00:38 )  0.060 ng/mL / x     / 1085 U/L / x     / x        Serum Pro-Brain Natriuretic Peptide: 1954 pg/mL (12-24-19 @ 10:22)    RADIOLOGY:   < from: CT Head No Cont (12.23.19 @ 21:39) >  CT head: No acute intracranial hemorrhage, mass effect, or acute territorial infarction.    CT maxillofacial: No acute facial bone fractures. Right maxillary sinusitis.    < end of copied text >    < from: Xray Chest 1 View-PORTABLE IMMEDIATE (12.24.19 @ 00:57) >  IMPRESSION: Cardiomegaly with clear lungs.    < end of copied text >    ECG [my interpretation]: 12/24/2019 @ 09:19: Sinus bradycardia at 51 bpm, left axis, left bundle branch block, unchanged from 2018    TELEMETRY:    ECHO: < from: Transthoracic Echocardiogram (12.13.18 @ 07:02) >  CONCLUSIONS:  1. Mild posterior mitral annular calcification. No mitral  regurgitation is noted.  2. Aortic valve not well visualized. No aortic stenosis. No  aortic valve regurgitation seen.  3. Normal aortic root.  4. Left atrium not well visualized, probably normal.  5. Normal left ventricular internal dimensions and wall  thicknesses.  6. Endocardium not well visualized; likely moderately  reduced left ventricular systolic function.  7. Grade I diastolic dysfunction (Impaired relaxation).  8. Right ventricle not well visualized. Normal RV systolic  function (TAPSE 3.4 cm).  9. RV systolic pressure is normal at  21 mm Hg.  10. Tricuspid valve not well seen. Trace tricuspid  regurgitation.  11. No pericardial effusion.    *** No previous Echo exam.    < end of copied text > CHIEF COMPLAINT: Fall    HPI: 89 yo F with HFrEF (40-45% by echo 12/2018) and HTN who presented after a fall. Patient reports she was at home when she tripped over her carpet and fell down. Denies loss of consciousness. No preceding or subsequent chest pain, dyspnea, palpitations, lightheadedness, or syncope. She crawled on the floor until she reached a phone, then she called her son who called EMS. Currently reports left wrist pain, otherwise no complaints. Per discussion with son at bedside, patient with multiple falls over past several weeks. She refuses to use a walker. No LOC. Patiend denies LE edema, orthopnea, or PND.    PAST MEDICAL & SURGICAL HISTORY:  As above, also Anxiety, chronic venous insufficiency, disc herniation  No significant past surgical history    Allergies    No Known Allergies    MEDICATIONS  (STANDING):  aspirin  chewable 81 milliGRAM(s) Oral daily  atorvastatin 40 milliGRAM(s) Oral at bedtime  escitalopram 10 milliGRAM(s) Oral daily  furosemide    Tablet 60 milliGRAM(s) Oral daily  heparin  Injectable 5000 Unit(s) SubCutaneous every 8 hours  lisinopril 40 milliGRAM(s) Oral daily  metoprolol tartrate 25 milliGRAM(s) Oral two times a day  senna 2 Tablet(s) Oral at bedtime    MEDICATIONS  (PRN):  acetaminophen   Tablet .. 650 milliGRAM(s) Oral every 6 hours PRN Mild Pain (1 - 3)  oxycodone    5 mG/acetaminophen 325 mG 1 Tablet(s) Oral every 6 hours PRN Moderate Pain (4 - 6)  oxycodone    5 mG/acetaminophen 325 mG 2 Tablet(s) Oral every 6 hours PRN Severe Pain (7 - 10)      FAMILY HISTORY:    No family history of premature coronary artery disease or sudden cardiac death    SOCIAL HISTORY:  Smoking-Denies  Alcohol-Denies  Illicit Drug use-Denies    REVIEW OF SYSTEMS:  Constitutional: [ ] fever, [ ]weight loss,  [ ]fatigue  Eyes: [ ] visual changes  Respiratory: [ ]shortness of breath;  [ ] cough, [ ]wheezing, [ ]chills, [ ]hemoptysis  Cardiovascular: [ ] chest pain, [ ]palpitations, [ ]dizziness,  [ ]leg swelling [ ]syncope  Gastrointestinal: [ ] abdominal pain, [ ]nausea, [ ]vomiting,  [ ]diarrhea   Genitourinary: [ ] dysuria, [ ] hematuria  Neurologic: [ ] headaches [ ] tremors  [ ] weakness [ ] lightheadedness  Skin: [ ] itching, [ ]burning, [ ] rashes  Endocrine: [ ] heat or cold intolerance  Musculoskeletal: [ x] joint pain or swelling; [ ] muscle, back, or extremity pain  Psychiatric: [ ] depression, [ ]anxiety, [ ]mood swings, or [ ]difficulty sleeping  Hematologic: [ ] easy bruising, [ ] bleeding gums       [ x] All others negative	  [ ] Unable to obtain    Vital Signs Last 24 Hrs  T(C): 36.4 (24 Dec 2019 08:19), Max: 37.3 (24 Dec 2019 04:35)  T(F): 97.5 (24 Dec 2019 08:19), Max: 99.2 (24 Dec 2019 04:35)  HR: 62 (24 Dec 2019 08:19) (62 - 89)  BP: 127/65 (24 Dec 2019 08:19) (121/52 - 170/92)  BP(mean): --  RR: 18 (24 Dec 2019 08:19) (18 - 20)  SpO2: 98% (24 Dec 2019 08:19) (93% - 98%)  I&O's Summary      PHYSICAL EXAM:  General: No acute distress  HEENT: EOMI, PERRL  Neck: Supple, No JVD  Lungs: Clear to auscultation bilaterally; No rales or wheezing  Heart: Regular rate and rhythm; No murmurs, rubs, or gallops  Abdomen: Nontender, bowel sounds present  Extremities: No clubbing, cyanosis, or edema  Nervous system:  Alert & Oriented X3, no focal deficits  Psychiatric: Normal affect  Skin: No rashes or lesions      LABS:  12-24    144  |  110<H>  |  29<H>  ----------------------------<  155<H>  3.8   |  28  |  0.94    Ca    8.6      24 Dec 2019 10:22  Phos  3.7     12-24  Mg     2.3     12-24    TPro  6.2  /  Alb  2.9<L>  /  TBili  0.8  /  DBili  x   /  AST  41<H>  /  ALT  24  /  AlkPhos  72  12-24    Creatinine Trend: 0.94<--, 0.95<--                        12.3   7.21  )-----------( 265      ( 24 Dec 2019 10:22 )             38.3     PT/INR - ( 24 Dec 2019 00:38 )   PT: 13.0 sec;   INR: 1.17 ratio         PTT - ( 24 Dec 2019 00:38 )  PTT:28.6 sec    Lipid Panel: Cholesterol, Serum 172  Direct LDL 96  HDL Cholesterol, Serum 62  Triglycerides, Serum 68    Cardiac Enzymes: CARDIAC MARKERS ( 24 Dec 2019 10:22 )  x     / x     / 841 U/L / x     / x      CARDIAC MARKERS ( 24 Dec 2019 06:44 )  0.074 ng/mL / x     / 920 U/L / x     / 7.2 ng/mL  CARDIAC MARKERS ( 24 Dec 2019 00:38 )  0.060 ng/mL / x     / 1085 U/L / x     / x        Serum Pro-Brain Natriuretic Peptide: 1954 pg/mL (12-24-19 @ 10:22)    RADIOLOGY:   < from: CT Head No Cont (12.23.19 @ 21:39) >  CT head: No acute intracranial hemorrhage, mass effect, or acute territorial infarction.    CT maxillofacial: No acute facial bone fractures. Right maxillary sinusitis.    < end of copied text >    < from: Xray Chest 1 View-PORTABLE IMMEDIATE (12.24.19 @ 00:57) >  IMPRESSION: Cardiomegaly with clear lungs.    < end of copied text >    ECG [my interpretation]: 12/24/2019 @ 09:19: Sinus bradycardia at 51 bpm, left axis, left bundle branch block, unchanged from 2018    TELEMETRY: LBBB with sinus arrhythmia, occasional PACs    ECHO: < from: Transthoracic Echocardiogram (12.13.18 @ 07:02) >  CONCLUSIONS:  1. Mild posterior mitral annular calcification. No mitral  regurgitation is noted.  2. Aortic valve not well visualized. No aortic stenosis. No  aortic valve regurgitation seen.  3. Normal aortic root.  4. Left atrium not well visualized, probably normal.  5. Normal left ventricular internal dimensions and wall  thicknesses.  6. Endocardium not well visualized; likely moderately  reduced left ventricular systolic function.  7. Grade I diastolic dysfunction (Impaired relaxation).  8. Right ventricle not well visualized. Normal RV systolic  function (TAPSE 3.4 cm).  9. RV systolic pressure is normal at  21 mm Hg.  10. Tricuspid valve not well seen. Trace tricuspid  regurgitation.  11. No pericardial effusion.    *** No previous Echo exam.    < end of copied text >     < from: Transthoracic Echocardiogram (12.24.19 @ 07:26) >  CONCLUSIONS:  1. Mitral annular calcification. Trace mitral  regurgitation.  2. Normal trileaflet aortic valve.  3. Aortic Root: 3.1 cm.  4. Normal left atrium.  LA volume index = 20 cc/m2.  5. Normal left ventricular internal dimensions and wall  thicknesses.  6. Moderate global left ventricular systolic dysfunction.  7. Grade I diastolic dysfunction (Impaired relaxation).  8. Normal right atrium.  9. Normal right ventricular size and systolic function  (TAPSE  2.6cm).  10. RV systolic pressure is mildly increased at  39 mm Hg.  11. There is trace tricuspid regurgitation.  12. Pulmonic valve not well seen.  13. Normal pericardium with no pericardial effusion.    < end of copied text >

## 2019-12-24 NOTE — H&P ADULT - PROBLEM SELECTOR PLAN 4
mild, given one liter in ED patient dry on exam, will hold further given chronic CHF. - p/w mechanical fall  - Pt stayed on the ground for a full 24 hours   - CK 1085.  - s/p 1 L NS in ED   - will hold further given chronic CHF  - f/u CK

## 2019-12-24 NOTE — H&P ADULT - HISTORY OF PRESENT ILLNESS
89 year old female from home lives alone with PMHx of HFrEF(on Lasix), HTN (on Benazepril) , chronic venous stasis,  disc herniation and Anxiety (on Lexapro) and no significant PSHx presents to ED s/p unwitnessed mechanical fall yesterday. Pt c/o L wrist pain. Pt states she was looking out the window from her home when she misstepped and fell onto her carpet around 19:30. Pt stayed on the ground for a full 24 hours until her son came the next day and found her. Pt denies antecedent symptoms, chest pain, dyspnea , LOC, or any other acute complaints. Denies smoking, alcohol, illicit drug use. NKDA.    ED course:   EKG : Sinus rhythm with PACs with LBBB . No ST-T wave changes appreciated    Trops 0.06  CT head: No acute intracranial hemorrhage, mass effect, or acute territorial infarction.  CT maxillofacial: No acute facial bone fractures. Right maxillary sinusitis.  CK 1085  Xray left wrist : Radius and ulna distal fracture

## 2019-12-24 NOTE — H&P ADULT - NSHPPHYSICALEXAM_GEN_ALL_CORE
Vital Signs Last 24 Hrs  T(C): 37.3 (24 Dec 2019 04:35), Max: 37.3 (24 Dec 2019 04:35)  T(F): 99.2 (24 Dec 2019 04:35), Max: 99.2 (24 Dec 2019 04:35)  HR: 74 (24 Dec 2019 04:35) (74 - 89)  BP: 135/68 (24 Dec 2019 04:35) (135/68 - 170/92)  RR: 18 (24 Dec 2019 04:35) (18 - 20)  SpO2: 96% (24 Dec 2019 04:35) (93% - 96%)        PHYSICAL EXAM:   · Physical Examination: Ecchymosis to L upper lip area. No dental trauma. No [place] fx. GCS 15, no raccoon eyes, no Battles sign, no scalp step off deformities. L wrist area tender. distal radial and ulnar pulses intact, cap refill < 2 seconds, full range of motion of arm +elbow flexion/extension/supination and pronation intact, +flexion and extension of all digits at DIP and PIP. left arm in cast   · CONSTITUTIONAL: Well appearing, awake, alert, oriented to person, place, time/situation and in no apparent distress.  · EYES: Clear bilaterally, pupils equal, round and reactive to light.  · CARDIAC: Normal rate, regular rhythm.  Heart sounds S1, S2.  No murmurs, rubs or gallops.  · RESPIRATORY: Breath sounds clear and equal bilaterally.  · GASTROINTESTINAL: Abdomen soft, non-tender, no guarding.  · NEUROLOGICAL: Alert and oriented, no focal deficits, no motor or sensory deficits.  · SKIN: Skin normal color for race, warm, dry and intact. No evidence of rash.  · PSYCHIATRIC: Alert and oriented to person, place, time/situation. normal mood and affect. no apparent risk to self or others.

## 2019-12-24 NOTE — CHART NOTE - NSCHARTNOTEFT_GEN_A_CORE
NP Note discussed with  Primary Attending    Patient is a 89y old  Female who presents with a chief complaint of Fall (24 Dec 2019 11:50)    HPI      89 year old female from home lives alone with PMHx of HFrEF(on Lasix), HTN (on Benazepril) , chronic venous stasis,  disc herniation and Anxiety (on Lexapro) and no significant PSHx who presented  to ED with left wrist pain after  unwitnessed mechanical fall . Pt admitted for frequent falls, left wrist fx, and r/o ACS. Pt found with left radius and ulna distal fracture per xray, now s/p splint. Ortho consulted. Head CT no acute findindings. CT maxillofacial shows right maxillary sinusitis, no acute facial bone fx. Troponins uptrending. EKG shows SR with PACs , LBBB, no ST-T changes. Cardiologist Dr. aBrber consulted. Pt found with Rhabdomyolysis , CK 1085--> 920. Continue with gently hydration.        INTERVAL HPI/OVERNIGHT EVENTS: Pt seen and examined this morning. Pt awake, reports feeling better, less pain in left wrist.  Pt denies SOB, chest pain.     MEDICATIONS  (STANDING):  aspirin  chewable 81 milliGRAM(s) Oral daily  atorvastatin 40 milliGRAM(s) Oral at bedtime  escitalopram 10 milliGRAM(s) Oral daily  furosemide    Tablet 60 milliGRAM(s) Oral daily  heparin  Injectable 5000 Unit(s) SubCutaneous every 8 hours  lisinopril 40 milliGRAM(s) Oral daily  metoprolol tartrate 25 milliGRAM(s) Oral two times a day  senna 2 Tablet(s) Oral at bedtime  sodium chloride 0.9%. 1000 milliLiter(s) (50 mL/Hr) IV Continuous <Continuous>    MEDICATIONS  (PRN):  acetaminophen   Tablet .. 650 milliGRAM(s) Oral every 6 hours PRN Mild Pain (1 - 3)  oxycodone    5 mG/acetaminophen 325 mG 1 Tablet(s) Oral every 6 hours PRN Moderate Pain (4 - 6)  oxycodone    5 mG/acetaminophen 325 mG 2 Tablet(s) Oral every 6 hours PRN Severe Pain (7 - 10)      __________________________________________________  REVIEW OF SYSTEMS:    CONSTITUTIONAL: No fever,   EYES: no acute visual disturbances  NECK: No pain or stiffness  RESPIRATORY: No cough; No shortness of breath  CARDIOVASCULAR: No chest pain, no palpitations  GASTROINTESTINAL: No pain. No nausea or vomiting; No diarrhea   NEUROLOGICAL: No headache or numbness, no tremors  MUSCULOSKELETAL: less pain in left wrist.   GENITOURINARY: no dysuria, no frequency, no hesitancy  PSYCHIATRY: no depression , no anxiety  ALL OTHER  ROS negative        Vital Signs Last 24 Hrs  T(C): 36.8 (24 Dec 2019 11:51), Max: 37.3 (24 Dec 2019 04:35)  T(F): 98.3 (24 Dec 2019 11:51), Max: 99.2 (24 Dec 2019 04:35)  HR: 56 (24 Dec 2019 11:51) (56 - 89)  BP: 101/52 (24 Dec 2019 11:51) (101/52 - 170/92)  BP(mean): --  RR: 18 (24 Dec 2019 11:51) (18 - 20)  SpO2: 99% (24 Dec 2019 11:51) (93% - 99%)    ________________________________________________  PHYSICAL EXAM:  GENERAL: NAD  HEENT: Normocephalic;  conjunctivae and sclerae clear; moist mucous membranes;   NECK : supple  CHEST/LUNG: Effort normal. Clear to auscultation bilaterally with good air entry   HEART: S1 S2  regular; no murmurs, gallops or rubs  ABDOMEN: Soft, Nontender, Nondistended; Bowel sounds present  EXTREMITIES: LUE in splint , wrapped in ACE . No cyanosis; no edema; no calf tenderness  SKIN: warm and dry; no rash  NERVOUS SYSTEM:  Awake and alert; Oriented  to place, person and time ; no new deficits    _________________________________________________  LABS:                        12.3   7.21  )-----------( 265      ( 24 Dec 2019 10:22 )             38.3     12    144  |  110<H>  |  29<H>  ----------------------------<  155<H>  3.8   |  28  |  0.94    Ca    8.6      24 Dec 2019 10:22  Phos  3.7     12  Mg     2.3     12-24    TPro  6.2  /  Alb  2.9<L>  /  TBili  0.8  /  DBili  x   /  AST  41<H>  /  ALT  24  /  AlkPhos  72  12-24    PT/INR - ( 24 Dec 2019 00:38 )   PT: 13.0 sec;   INR: 1.17 ratio         PTT - ( 24 Dec 2019 00:38 )  PTT:28.6 sec  Urinalysis Basic - ( 24 Dec 2019 10:03 )    Color: Yellow / Appearance: Slightly Turbid / S.020 / pH: x  Gluc: x / Ketone: Small  / Bili: Negative / Urobili: Negative   Blood: x / Protein: 15 / Nitrite: Positive   Leuk Esterase: Moderate / RBC: 2-5 /HPF / WBC 26-50 /HPF   Sq Epi: x / Non Sq Epi: Moderate /HPF / Bacteria: Moderate /HPF      CAPILLARY BLOOD GLUCOSE            RADIOLOGY & ADDITIONAL TESTS:    < from: Xray Wrist 3 Views, Left (19 @ 21:29) >    MPRESSION:  Acute/subacute distal radial and ulnar fractures.    ---------------------------    < from: CT Head No Cont (19 @ 21:39) >    MPRESSION:     CT head: No acute intracranial hemorrhage, mass effect, or acute territorial infarction.    CT maxillofacial: No acute facial bone fractures. Right maxillary sinusitis.  ------------------------------  < from: Xray Chest 1 View-PORTABLE IMMEDIATE (19 @ 00:57) >      IMPRESSION: Cardiomegaly with clear lungs.                  Consultant(s) Notes Reviewed:   YES/ No    Care Discussed with Consultants :     Plan of care was discussed with patient and /or primary care giver; all questions and concerns were addressed and care was aligned with patient's wishes.        Assessment /Plan  1, NSTEMI         -Trop .06--->.074         -EKG shows SR with PAC's, , LBBB, no ST-T changes         -c/w ASA, Metoprolol with hold parameters, Statin, ACEI (with hold parameters)         -Tele monitor          -f/u Trop #3         -f/u Cardiology consult with Dr. Barber     2. Left wrist ulna and radium fx        -s/p splint         -c/w pain control, ice pack         -Elevate          -PT/OT         -No Ortho/surgical interventions          -F/u outpatient Ortho         -Appreciate Ortho  3. Frequent falls         -f/u orthostatic BP         -PT  4. Chronic systolic HF         -Pt appears euvolemic         -BNP          -c/w ACEI, BB,Lasix         -Echo         -f/u card consult with Dr. Barber  5. Rhabdomyolysis          -Pt reports she stayed on the floor for awhile, not wanting to call and disturb her family.           -CK downtrending, now 841          -c/w gentle IV hydration          -Trend CK  6. HTN          -BP decreased to low 100's/50's          -On Metoprolol, Lisinopril, and lasix  with hold parameters          - f/u Cardiology consult   7. Need for ppx          - heparin SC  8. Advance care planning          Pt is DNR/DNI           MOLST in chart  9. Discharge planning issues         Pt lives alone, having frequent falls         PT eval         CM consulted

## 2019-12-24 NOTE — ED PROVIDER NOTE - PHYSICAL EXAMINATION
Ecchymosis to L upper lip area. No dental trauma. No [place] fx. GCS 15, no raccoon eyes, no Battles sign, no scalp step off deformities. L wrist area tender. distal radial and ulnar pulses intact, cap refill < 2 seconds, full range of motion of arm +elbow flexion/extension/supination and pronation intact, +flexion and extension of all digits at DIP and PIP.

## 2019-12-24 NOTE — H&P ADULT - NSICDXPASTMEDICALHX_GEN_ALL_CORE_FT
PAST MEDICAL HISTORY:  Anxiety     CHF (congestive heart failure)     HTN (hypertension)     Leg swelling

## 2019-12-24 NOTE — ED PROVIDER NOTE - OBJECTIVE STATEMENT
89 y/o F pt with a PMHx of CHF (on Lasix), HTN (on Benazepril) , and Anxiety (on Lexapro) and no significant PSHx presents to ED s/p unwitnessed mechanical fall yesterday. Pt c/o L wrist pain. Pt states she was looking out the window from her home when she misstepped and fell onto her carpet around 19:30. Pt stayed on the ground for a full 24 hours until her son came the next day and found her. Pt denies LOC, or any other acute complaints. NKDA.

## 2019-12-24 NOTE — CONSULT NOTE ADULT - ASSESSMENT
89 yo F with HFrEF (40-45% by echo 12/2018) and HTN who presented after a fall.     1. Fall: Appears mechanical in nature, patient denies loss of consciousness    2. Mildly elevated cardiac enzymes: Trend troponins to peak.   -Likely due to fall as well as component of rhabdomyolysis (CPK 1085)  -Reasonable to repeat echocardiogram    3. HFrEF: Patient is on losartan  -Agree with lisinopril, metoprolol; titrate as hemodynamically tolerated  -On furosemide at home  -Currently not in HF exacerbation; pro-BNP probably normal for patient's age, and the CXR shows clear lungs     ***Note that this is a preliminary note and any recommendations should NOT be carried out until this note is finalized. *** 89 yo F with HFrEF (40-45% by echo 12/2018) and HTN who presented after a fall.     1. Fall: Appears mechanical in nature, patient denies loss of consciousness  -PT eval, patient encouraged to use walker for support    2. Mildly elevated cardiac enzymes: Trend troponins to peak.   -Likely due to fall as well as component of rhabdomyolysis (CPK 1085)  -Reasonable to repeat echocardiogram  ***Echo unchanged form 2018    3. HFrEF:   -Agree with lisinopril, metoprolol; titrate as hemodynamically tolerated  -On furosemide at home  -Currently not in HF exacerbation; pro-BNP probably normal for patient's age, and the CXR shows clear lungs

## 2019-12-24 NOTE — CONSULT NOTE ADULT - SUBJECTIVE AND OBJECTIVE BOX
EVA SRAVANTHI  [id number]    ORTHOPEDIC CONSULT:    Orthopedic diagnosis: left distal radius and ulna fx    HPI:  89 year old female from home lives alone with PMHx of HFrEF(on Lasix), HTN (on Benazepril) , chronic venous stasis,  disc herniation and Anxiety (on Lexapro) and no significant PSHx presents to ED s/p unwitnessed mechanical fall yesterday. Pt c/o L wrist pain. Pt states she was looking out the window from her home when she misstepped and fell onto her carpet around 19:30. Pt stayed on the ground for a full 24 hours until her son came the next day and found her. Pt denies antecedent symptoms, chest pain, dyspnea , LOC, or any other acute complaints. Denies smoking, alcohol, illicit drug use. NKDA.    ED course:   EKG : Sinus rhythm with PACs with LBBB . No ST-T wave changes appreciated    Trops 0.06  CT head: No acute intracranial hemorrhage, mass effect, or acute territorial infarction.  CT maxillofacial: No acute facial bone fractures. Right maxillary sinusitis.  CK 1085  Xray left wrist : Radius and ulna distal fracture (24 Dec 2019 04:02)    89 y/o F, looks younger than stated age, right hand dominant, seen and evaluated at bedside in ER, left wrist pain mild in splint. moving fingers well. denies numbness or tingling.  denies any other trauma or joint pain.    PAST MEDICAL & SURGICAL HISTORY:  Anxiety  HTN (hypertension)  CHF (congestive heart failure)  Leg swelling  No significant past surgical history    NKDA        MEDICATIONS  (STANDING):  aspirin  chewable 81 milliGRAM(s) Oral daily  atorvastatin 40 milliGRAM(s) Oral at bedtime  escitalopram 10 milliGRAM(s) Oral daily  furosemide    Tablet 60 milliGRAM(s) Oral daily  heparin  Injectable 5000 Unit(s) SubCutaneous every 8 hours  lisinopril 40 milliGRAM(s) Oral daily  metoprolol tartrate 25 milliGRAM(s) Oral two times a day  senna 2 Tablet(s) Oral at bedtime    MEDICATIONS  (PRN):  acetaminophen   Tablet .. 650 milliGRAM(s) Oral every 6 hours PRN Mild Pain (1 - 3)  oxycodone    5 mG/acetaminophen 325 mG 1 Tablet(s) Oral every 6 hours PRN Moderate Pain (4 - 6)  oxycodone    5 mG/acetaminophen 325 mG 2 Tablet(s) Oral every 6 hours PRN Severe Pain (7 - 10)      PHYSICAL EXAM:    Vital Signs Last 24 Hrs  T(C): 36.8 (24 Dec 2019 11:51), Max: 37.3 (24 Dec 2019 04:35)  T(F): 98.3 (24 Dec 2019 11:51), Max: 99.2 (24 Dec 2019 04:35)  HR: 56 (24 Dec 2019 11:51) (56 - 89)  BP: 101/52 (24 Dec 2019 11:51) (101/52 - 170/92)  BP(mean): --  RR: 18 (24 Dec 2019 11:51) (18 - 20)  SpO2: 99% (24 Dec 2019 11:51) (93% - 99%)      Gen: laying comfortably in bed, having lunch. well developed, well nourished, comfortable  Rectal: deferred  Extremities: no clubbing/cyanosis, no edema, no calf tenderness  Vascular:  DP/PT 2+ b/l  Neurological: no focal deficits  Skin: Skin intact, no abrasions, no lacerations  Musculoskeletal:             Left Wrist:    splint intact, Rad/Uln./Median nerves intact. SILT. FROM of the digits.  Compartments soft. 2+pulses of the radial/ulnar artery, brisk, with good cap refill. No atrophy noted.       IMPRESSION: Pt is a 89 y/o Female Left distal radius and ulna fracture, s/p closed reduction and splinting by ED  PLAN:  -  Pain management prn  -  Follow up instructions for Left Wrist was given to patient, keep the affected extremity elevated.   -  Patient is to follow up with Orthopedic surgeon after discharge at [phone] in 1 week for xrays and splint change to cast.  -  Case d/w Dr. Peterson  -  Pt is orthopedically stable for discharge  -  Pt was made aware that there is a possibility for ORIF of the wrist fracture, that follow up is a priority.   -  Patient's questions were answered.

## 2019-12-24 NOTE — H&P ADULT - ASSESSMENT
89 y/o F pt with a PMHx of CHF (on Lasix), HTN (on Benazepril) , and Anxiety (on Lexapro) and no significant PSHx presents to ED s/p unwitnessed mechanical fall yesterday. Pt c/o L wrist pain. Pt states she was looking out the window from her home when she misstepped and fell onto her carpet around 19:30. Pt stayed on the ground for a full 24 hours until her son came the next day and found her. Pt denies LOC, or any other acute complaints. NKDA.    89 y/o F with HFrEF, chronic venous stasis, HTN recurrent falls admitted with mechanical fall and L radius and ulna fracture. Denies antecedent symptoms, chest pain, dyspnea and has otherwise been in good health. Lasix was just increased and her LE edema has improved.           EKG : Sinus rhythm with PACs with LBBB           Pt is DNR/DNI  MOLST form filled in chart pending attending signature    Pt is admitted for management of Frequent falls and Radius and ulna distal fracture and to r/o ACS 89 y/o F pt with a PMHx of CHF (on Lasix), HTN (on Benazepril) , and Anxiety (on Lexapro) and no significant PSHx presents to ED s/p unwitnessed mechanical fall yesterday. Pt c/o L wrist pain. Pt states she was looking out the window from her home when she misstepped and fell onto her carpet around 19:30. Pt stayed on the ground for a full 24 hours until her son came the next day and found her. Pt denies LOC, or any other acute complaints. NKDA.    89 y/o F with HFrEF, chronic venous stasis, HTN recurrent falls admitted with mechanical fall and L radius and ulna fracture. Denies antecedent symptoms, chest pain, dyspnea and has otherwise been in good health. Lasix was just increased and her LE edema has improved.       89F, from home with HHA X6hrs, walks with walker PMHx of HTN, chronic leg swelling, disc herniation  presented to ED c/o wound in lt. leg. She states she has been having swelling of B/L leg since many years and usually gets worse during summer. She noticed it got sore 1 month ago and developed blister 3 wks ago, then 1 week ago, it bursted and she was taking ciprofloxacin since friday with wound care at home. She says she had all cardiac work up done for leg swelling and was normal. She was on lasix but is non-complaint. Denies insect bite, burn, shortness of breath, chest pain, cough, N/V/D, fever, no urinary complaints.    ED course: Vitals stable  Labs significant for grossly positive UA  Radiological findings venous doppler shows There is subcutaneous edema in the calf, No evidence of left lower extremity deep venous thrombosis. Xray tibia and fibula shows Edema in most of the lower leg and ankle. No bone destruction or fracture is evident.  Antimicrobial- vanco 1 dose        EKG : Sinus rhythm with PACs with LBBB   CT Head :         Pt is DNR/DNI  MOLST form filled in chart pending attending signature    Pt is admitted for management of Frequent falls and Radius and ulna distal fracture and to r/o ACS 89 year old female from home lives alone with PMHx of HFrEF(on Lasix), HTN (on Benazepril) , chronic venous stasis,  disc herniation and Anxiety (on Lexapro) and no significant PSHx presents to ED s/p unwitnessed mechanical fall yesterday. Pt c/o L wrist pain. Pt states she was looking out the window from her home when she misstepped and fell onto her carpet around 19:30. Pt stayed on the ground for a full 24 hours until her son came the next day and found her. Pt denies antecedent symptoms, chest pain, dyspnea , LOC, or any other acute complaints. Denies smoking, alcohol, illicit drug use. NKDA.    ED course:   EKG : Sinus rhythm with PACs with LBBB . No ST-T wave changes appreciated    Trops 0.06  CT head: No acute intracranial hemorrhage, mass effect, or acute territorial infarction.  CT maxillofacial: No acute facial bone fractures. Right maxillary sinusitis.  CK 1085  Xray left wrist : Radius and ulna distal fracture    Pt is DNR/DNI  MOLST form filled in chart pending attending signature    Pt is admitted for management of Frequent falls and Radius and ulna distal fracture and to r/o ACS

## 2019-12-24 NOTE — ED PROVIDER NOTE - CLINICAL SUMMARY MEDICAL DECISION MAKING FREE TEXT BOX
Pt was on ground for 24 hours. Will also check CPK. Pt was on ground for 24 hours. Will also check CPK. Family members son and daughter, Mikhail and Jenny at bedside state pt w/multiple falls recently. Pt was on ground for 24 hours. Will also check CPK. Family members son and daughter, Mikhail and Jenny at bedside state pt w/multiple falls recently.  MAr and Dr. Link endorsed. Pt agrees with admission for monitoring and possible PT/rehab.  I had a detailed discussion with the patient and/or guardian regarding the historical points, exam findings, and any diagnostic results supporting the admit diagnosis.

## 2019-12-24 NOTE — H&P ADULT - PROBLEM SELECTOR PLAN 2
check b12, tsh, vit D, PT consult, fall precautions - Patient presents s/p multiple falls associated with weakness, no LOC, headache, dizziness, chest pain, SOB.  - likely mechanical, could be due to deconditioning, weakness as patient on lasix  - EKG : Sinus rhythm with PACs with LBBB . No ST-T wave changes appreciated    - Trops 1st set -ve   - f/u Trops 2nd set   - CT head: No acute intracranial hemorrhage, mass effect, or acute territorial infarction.  - Fall Precaution   - Tele monitoring   - f/u Orthostatics   - f/u Echo  - f/u Vitamin B12, B6, Folate, TSH , lipid profile , hgb a1c  - f/u PT consult

## 2019-12-24 NOTE — ED PROVIDER NOTE - SECONDARY DIAGNOSIS.
Radius and ulna distal fracture, left, closed, initial encounter Head injury Facial contusion, initial encounter Rhabdomyolysis

## 2019-12-24 NOTE — ED PROVIDER NOTE - PROGRESS NOTE DETAILS
CK 1085 IVF ordered. trop mildly elevated may be due to elevated CK. Pt has no chest pain. ASA held due to recent trauma.

## 2019-12-24 NOTE — H&P ADULT - PROBLEM SELECTOR PLAN 5
4. Chronic sys CHF- not on bb for unclear reason, will start, cont ACE-i renal fn at baseline, reeval ef with echo as above - Prior History of Systolic HF   - Last ECHO Dec 2018 : Severe Lt systolic dysf EF 40-45%  - on exam no RALES , crackles or  leg edema   - No evidence of pul edema on CXR  - f/u ProBNP   - No JVD  - no increased abdominal girth   - EKG : Sinus rhythm with PACs with LBBB . No ST-T wave changes appreciated    - admitted to TELE  - fluid restrictions 1200 mL , DASH Diet   - pt was not on BB for unclear reason,  - c/w Benazapril   - f/u weight daily , intake & output   - keep negative  - f/u ECHO  - c/w Lasix IV 60 mg qd  ** Cardiologist Consulted Dr. Barber - Prior History of Systolic HF   - Last ECHO Dec 2018 : Severe Lt systolic dysf EF 40-45%  - on exam no RALES , crackles or  leg edema   - No evidence of pul edema on CXR  - f/u ProBNP   - No JVD  - no increased abdominal girth   - EKG : Sinus rhythm with PACs with LBBB . No ST-T wave changes appreciated    - admitted to TELE  - fluid restrictions 1200 mL , DASH Diet   - pt was not on BB for unclear reason,  - f/u weight daily , intake & output   - keep negative  - f/u ECHO  - pt takes Benazapril   - c/w Lisinopril  - c/w Lasix IV 60 mg qd  ** Cardiologist Consulted Dr. Barber

## 2019-12-24 NOTE — H&P ADULT - PROBLEM SELECTOR PLAN 6
- Pt taking Benazapril and Lasix at home    - c/w Benazapril and Lasix with parameters  - Monitor BP closely and adjust medications if clinically indicated.  - DASH diet.

## 2019-12-24 NOTE — ED PROVIDER NOTE - CARE PLAN
Principal Discharge DX:	Frequent falls  Secondary Diagnosis:	Radius and ulna distal fracture, left, closed, initial encounter  Secondary Diagnosis:	Head injury  Secondary Diagnosis:	Facial contusion, initial encounter Principal Discharge DX:	Frequent falls  Secondary Diagnosis:	Radius and ulna distal fracture, left, closed, initial encounter  Secondary Diagnosis:	Head injury  Secondary Diagnosis:	Facial contusion, initial encounter  Secondary Diagnosis:	Rhabdomyolysis

## 2019-12-25 ENCOUNTER — TRANSCRIPTION ENCOUNTER (OUTPATIENT)
Age: 84
End: 2019-12-25

## 2019-12-25 DIAGNOSIS — E55.9 VITAMIN D DEFICIENCY, UNSPECIFIED: ICD-10-CM

## 2019-12-25 LAB
ALBUMIN SERPL ELPH-MCNC: 2.5 G/DL — LOW (ref 3.5–5)
ALP SERPL-CCNC: 61 U/L — SIGNIFICANT CHANGE UP (ref 40–120)
ALT FLD-CCNC: 21 U/L DA — SIGNIFICANT CHANGE UP (ref 10–60)
ANION GAP SERPL CALC-SCNC: 5 MMOL/L — SIGNIFICANT CHANGE UP (ref 5–17)
AST SERPL-CCNC: 28 U/L — SIGNIFICANT CHANGE UP (ref 10–40)
BASOPHILS # BLD AUTO: 0.06 K/UL — SIGNIFICANT CHANGE UP (ref 0–0.2)
BASOPHILS NFR BLD AUTO: 0.8 % — SIGNIFICANT CHANGE UP (ref 0–2)
BILIRUB SERPL-MCNC: 0.5 MG/DL — SIGNIFICANT CHANGE UP (ref 0.2–1.2)
BUN SERPL-MCNC: 26 MG/DL — HIGH (ref 7–18)
CALCIUM SERPL-MCNC: 8.3 MG/DL — LOW (ref 8.4–10.5)
CHLORIDE SERPL-SCNC: 112 MMOL/L — HIGH (ref 96–108)
CO2 SERPL-SCNC: 26 MMOL/L — SIGNIFICANT CHANGE UP (ref 22–31)
CREAT SERPL-MCNC: 0.83 MG/DL — SIGNIFICANT CHANGE UP (ref 0.5–1.3)
EOSINOPHIL # BLD AUTO: 0.26 K/UL — SIGNIFICANT CHANGE UP (ref 0–0.5)
EOSINOPHIL NFR BLD AUTO: 3.7 % — SIGNIFICANT CHANGE UP (ref 0–6)
GLUCOSE SERPL-MCNC: 90 MG/DL — SIGNIFICANT CHANGE UP (ref 70–99)
HCT VFR BLD CALC: 35.1 % — SIGNIFICANT CHANGE UP (ref 34.5–45)
HGB BLD-MCNC: 11.1 G/DL — LOW (ref 11.5–15.5)
IMM GRANULOCYTES NFR BLD AUTO: 0.3 % — SIGNIFICANT CHANGE UP (ref 0–1.5)
LYMPHOCYTES # BLD AUTO: 2.29 K/UL — SIGNIFICANT CHANGE UP (ref 1–3.3)
LYMPHOCYTES # BLD AUTO: 32.4 % — SIGNIFICANT CHANGE UP (ref 13–44)
MAGNESIUM SERPL-MCNC: 2.3 MG/DL — SIGNIFICANT CHANGE UP (ref 1.6–2.6)
MCHC RBC-ENTMCNC: 29.8 PG — SIGNIFICANT CHANGE UP (ref 27–34)
MCHC RBC-ENTMCNC: 31.6 GM/DL — LOW (ref 32–36)
MCV RBC AUTO: 94.4 FL — SIGNIFICANT CHANGE UP (ref 80–100)
MONOCYTES # BLD AUTO: 0.88 K/UL — SIGNIFICANT CHANGE UP (ref 0–0.9)
MONOCYTES NFR BLD AUTO: 12.4 % — SIGNIFICANT CHANGE UP (ref 2–14)
NEUTROPHILS # BLD AUTO: 3.56 K/UL — SIGNIFICANT CHANGE UP (ref 1.8–7.4)
NEUTROPHILS NFR BLD AUTO: 50.4 % — SIGNIFICANT CHANGE UP (ref 43–77)
NRBC # BLD: 0 /100 WBCS — SIGNIFICANT CHANGE UP (ref 0–0)
PHOSPHATE SERPL-MCNC: 3.3 MG/DL — SIGNIFICANT CHANGE UP (ref 2.5–4.5)
PLATELET # BLD AUTO: 226 K/UL — SIGNIFICANT CHANGE UP (ref 150–400)
POTASSIUM SERPL-MCNC: 3.7 MMOL/L — SIGNIFICANT CHANGE UP (ref 3.5–5.3)
POTASSIUM SERPL-SCNC: 3.7 MMOL/L — SIGNIFICANT CHANGE UP (ref 3.5–5.3)
PROT SERPL-MCNC: 5.7 G/DL — LOW (ref 6–8.3)
RBC # BLD: 3.72 M/UL — LOW (ref 3.8–5.2)
RBC # FLD: 14.7 % — HIGH (ref 10.3–14.5)
SODIUM SERPL-SCNC: 143 MMOL/L — SIGNIFICANT CHANGE UP (ref 135–145)
WBC # BLD: 7.07 K/UL — SIGNIFICANT CHANGE UP (ref 3.8–10.5)
WBC # FLD AUTO: 7.07 K/UL — SIGNIFICANT CHANGE UP (ref 3.8–10.5)

## 2019-12-25 PROCEDURE — 99233 SBSQ HOSP IP/OBS HIGH 50: CPT | Mod: GC

## 2019-12-25 RX ADMIN — Medication 1000 UNIT(S): at 12:36

## 2019-12-25 RX ADMIN — Medication 81 MILLIGRAM(S): at 12:37

## 2019-12-25 RX ADMIN — Medication 25 MILLIGRAM(S): at 06:27

## 2019-12-25 RX ADMIN — Medication 60 MILLIGRAM(S): at 06:42

## 2019-12-25 RX ADMIN — HEPARIN SODIUM 5000 UNIT(S): 5000 INJECTION INTRAVENOUS; SUBCUTANEOUS at 12:37

## 2019-12-25 RX ADMIN — ATORVASTATIN CALCIUM 40 MILLIGRAM(S): 80 TABLET, FILM COATED ORAL at 22:17

## 2019-12-25 RX ADMIN — HEPARIN SODIUM 5000 UNIT(S): 5000 INJECTION INTRAVENOUS; SUBCUTANEOUS at 22:16

## 2019-12-25 RX ADMIN — Medication 25 MILLIGRAM(S): at 17:52

## 2019-12-25 RX ADMIN — LISINOPRIL 40 MILLIGRAM(S): 2.5 TABLET ORAL at 06:27

## 2019-12-25 RX ADMIN — HEPARIN SODIUM 5000 UNIT(S): 5000 INJECTION INTRAVENOUS; SUBCUTANEOUS at 06:26

## 2019-12-25 RX ADMIN — SENNA PLUS 2 TABLET(S): 8.6 TABLET ORAL at 22:17

## 2019-12-25 RX ADMIN — ESCITALOPRAM OXALATE 10 MILLIGRAM(S): 10 TABLET, FILM COATED ORAL at 12:38

## 2019-12-25 NOTE — DISCHARGE NOTE PROVIDER - NSDCMRMEDTOKEN_GEN_ALL_CORE_FT
benazepril 40 mg oral tablet: 1 tab(s) orally once a day  Lasix 40 mg oral tablet: 1.5 tab(s) orally once a day  Lexapro 10 mg oral tablet: 1 tab(s) orally once a day acetaminophen 325 mg oral tablet: 2 tab(s) orally every 6 hours, As needed, Mild Pain (1 - 3)  aspirin 81 mg oral tablet, chewable: 1 tab(s) orally once a day  atorvastatin 40 mg oral tablet: 1 tab(s) orally once a day (at bedtime)  benazepril 40 mg oral tablet: 1 tab(s) orally once a day  cholecalciferol oral tablet: 1000 unit(s) orally once a day  furosemide 20 mg oral tablet: 3 tab(s) orally once a day  Lexapro 10 mg oral tablet: 1 tab(s) orally once a day  lisinopril 40 mg oral tablet: 1 tab(s) orally once a day  senna oral tablet: 2 tab(s) orally once a day (at bedtime) acetaminophen 325 mg oral tablet: 2 tab(s) orally every 6 hours, As needed, Mild Pain (1 - 3)  aspirin 81 mg oral tablet, chewable: 1 tab(s) orally once a day  atorvastatin 40 mg oral tablet: 1 tab(s) orally once a day (at bedtime)  benazepril 40 mg oral tablet: 1 tab(s) orally once a day  cholecalciferol oral tablet: 1000 unit(s) orally once a day  furosemide 20 mg oral tablet: 3 tab(s) orally once a day  Lexapro 10 mg oral tablet: 1 tab(s) orally once a day  Metoprolol Succinate ER 50 mg oral tablet, extended release: 1 tab(s) orally once a day   senna oral tablet: 2 tab(s) orally once a day (at bedtime)

## 2019-12-25 NOTE — DISCHARGE NOTE PROVIDER - HOSPITAL COURSE
89 year old female from home lives alone with PMHx of HFrEF(on Lasix), HTN (on Benazepril) , chronic venous stasis,  disc herniation and Anxiety (on Lexapro) and no significant PSHx presents to ED s/p unwitnessed mechanical fall yesterday. Pt c/o L wrist pain. Pt states she was looking out the window from her home when she misstepped and fell onto her carpet around 19:30. Pt stayed on the ground for a full 24 hours until her son came the next day and found her. Pt denies antecedent symptoms, chest pain, dyspnea , LOC, or any other acute complaints. Denies smoking, alcohol, illicit drug use. NKDA.    EKG showed sinus rhythm with PACs with LBBB . No ST-T wave changes appreciated      Troponin trended 0.060, 0.074, 0.059.     CT head showed no acute intracranial hemorrhage, mass effect, or acute territorial infarction.    CT maxillofacial: No acute facial bone fractures, right maxillary sinusitis.     CK was 1085, trended down. Xray left wrist showed Radius and ulna distal fracture. Orthopedic was consulted and stated keep in splint, non weight bearing to left hand/wrist, no lifting,    keep elevated, ICE prn for pain/swelling.    Cardiology was consulted and TTE was done, which showed EF 40-45%, Moderate global left ventricular systolic dysfunction, Grade I diastolic dysfunction (Impaired relaxation). unchanged from 2018. Pt was managed w/  lisinopril, metoprolol, furosemide.                     Pt needs follow up with Dr. Kristen Guzman (Orthopedic Hand Surgeon) in 1 week  [phone]. 89 year old female from home lives alone with PMHx of HFrEF(on Lasix), HTN (on Benazepril) , chronic venous stasis,  disc herniation and Anxiety (on Lexapro) and no significant PSHx presents to ED s/p unwitnessed mechanical fall yesterday. Pt c/o L wrist pain. Pt states she was looking out the window from her home when she misstepped and fell onto her carpet around 19:30. Pt stayed on the ground for a full 24 hours until her son came the next day and found her. Pt denies antecedent symptoms, chest pain, dyspnea , LOC, or any other acute complaints. Denies smoking, alcohol, illicit drug use. NKDA.    EKG showed sinus rhythm with PACs with LBBB . No ST-T wave changes appreciated      Troponin trended 0.060, 0.074, 0.059.     CT head showed no acute intracranial hemorrhage, mass effect, or acute territorial infarction.    CT maxillofacial: No acute facial bone fractures, right maxillary sinusitis.     CK was 1085, trended down. Xray left wrist showed Radius and ulna distal fracture. Orthopedic was consulted and stated keep in splint, non weight bearing to left hand/wrist, no lifting,    keep elevated, ICE prn for pain/swelling.    Cardiology was consulted and TTE was done, which showed EF 40-45%, Moderate global left ventricular systolic dysfunction, Grade I diastolic dysfunction (Impaired relaxation). unchanged from 2018. Pt was managed w/  lisinopril, metoprolol, furosemide.             Pt needs follow up with Dr. Kristen Guzman (Orthopedic Hand Surgeon) in 1 week  [phone].

## 2019-12-25 NOTE — PROGRESS NOTE ADULT - ASSESSMENT
89 year old female from home lives alone with PMHx of HFrEF(on Lasix), HTN (on Benazepril) , chronic venous stasis,  disc herniation and Anxiety (on Lexapro) and no significant PSHx presents to ED s/p unwitnessed mechanical fall yesterday. Pt c/o L wrist pain. PT was admitted for left wrist fracture and safe placement.      EKG : Sinus rhythm with PACs with LBBB . No ST-T wave changes appreciated    Trops 0.06  CT head: No acute intracranial hemorrhage, mass effect, or acute territorial infarction.  CT maxillofacial: No acute facial bone fractures. Right maxillary sinusitis.  CK 1085  Xray left wrist : Radius and ulna distal fracture    Pt is DNR/DNI  MOLST form filled in chart pending attending signature    Pt is admitted for management of Frequent falls and Radius and ulna distal fracture and to r/o ACS

## 2019-12-25 NOTE — PHYSICAL THERAPY INITIAL EVALUATION ADULT - ACTIVE RANGE OF MOTION EXAMINATION, REHAB EVAL
Right UE Active ROM was WFL (within functional limits)/Left UE in a sling, NWB/bilateral  lower extremity Active ROM was WFL (within functional limits)

## 2019-12-25 NOTE — DISCHARGE NOTE PROVIDER - NSDCFUADDAPPT_GEN_ALL_CORE_FT
Please follow up with hand surgeon  Dr. Thomas Peterson at [phone] or   Dr Pradeep Chang [phone].

## 2019-12-25 NOTE — PROGRESS NOTE ADULT - PROBLEM SELECTOR PLAN 2
- Patient presents s/p multiple falls associated with weakness, no LOC, headache, dizziness, chest pain, SOB.  - likely mechanical, could be due to deconditioning, weakness as patient on lasix  - EKG : Sinus rhythm with PACs with LBBB . No ST-T wave changes appreciated    - CT head: No acute intracranial hemorrhage, mass effect, or acute territorial infarction.  - Fall Precaution   - Tele monitoring   - PT eval: BRIGIDO

## 2019-12-25 NOTE — PROGRESS NOTE ADULT - SUBJECTIVE AND OBJECTIVE BOX
PGY1 Note discussed with supervising resident and primary attending.    Patient is a 89y old  Female who presents with a chief complaint of unwitnessed fall (24 Dec 2019 13:25)      INTERVAL HPI/OVERNIGHT EVENTS:    MEDICATIONS  (STANDING):  aspirin  chewable 81 milliGRAM(s) Oral daily  atorvastatin 40 milliGRAM(s) Oral at bedtime  cholecalciferol 1000 Unit(s) Oral daily  escitalopram 10 milliGRAM(s) Oral daily  furosemide    Tablet 60 milliGRAM(s) Oral daily  heparin  Injectable 5000 Unit(s) SubCutaneous every 8 hours  lisinopril 40 milliGRAM(s) Oral daily  metoprolol tartrate 25 milliGRAM(s) Oral two times a day  senna 2 Tablet(s) Oral at bedtime  sodium chloride 0.9%. 1000 milliLiter(s) (50 mL/Hr) IV Continuous <Continuous>    MEDICATIONS  (PRN):  acetaminophen   Tablet .. 650 milliGRAM(s) Oral every 6 hours PRN Mild Pain (1 - 3)      Allergies    No Known Allergies    Intolerances        REVIEW OF SYSTEMS:  CONSTITUTIONAL: No fever, weight loss, or fatigue  RESPIRATORY: No cough, wheezing, chills or hemoptysis; No shortness of breath  CARDIOVASCULAR: No chest pain, palpitations, dizziness, or leg swelling  GASTROINTESTINAL: No abdominal or epigastric pain. No nausea, vomiting, or hematemesis; No diarrhea or constipation. No melena or hematochezia.  NEUROLOGICAL: No headaches, memory loss, loss of strength, numbness, or tremors (+) cast on L wrist and fore arm  SKIN: No itching, burning, rashes, or lesions     Vital Signs Last 24 Hrs  T(C): 36.6 (25 Dec 2019 11:08), Max: 36.8 (24 Dec 2019 19:08)  T(F): 97.8 (25 Dec 2019 11:08), Max: 98.3 (24 Dec 2019 19:08)  HR: 66 (25 Dec 2019 10:56) (60 - 84)  BP: 119/71 (25 Dec 2019 10:56) (119/71 - 169/70)  BP(mean): --  RR: 18 (25 Dec 2019 05:35) (18 - 18)  SpO2: 95% (25 Dec 2019 10:56) (95% - 100%)    PHYSICAL EXAM:  GENERAL: NAD, well-groomed, well-developed  HEAD:  Atraumatic, Normocephalic   EYES: EOMI, PERRLA, conjunctiva and sclera clear  NECK: Supple, No JVD, Normal thyroid  CHEST/LUNG: Clear to percussion bilaterally; No rales, rhonchi, wheezing, or rubs  HEART: Regular rate and rhythm; No murmurs, rubs, or gallops  ABDOMEN: Soft, Nontender, Nondistended; Bowel sounds present  NERVOUS SYSTEM:  Alert & Oriented X3, Good concentration; Motor Strength 5/5 B/L   EXTREMITIES:  2+ Peripheral Pulses, No clubbing, cyanosis, or edema (+) cast on L wrist and fore arm  SKIN; (+) bruise by left lip    LABS:                        11.1   7.07  )-----------( 226      ( 25 Dec 2019 07:59 )             35.1     12    143  |  112<H>  |  26<H>  ----------------------------<  90  3.7   |  26  |  0.83    Ca    8.3<L>      25 Dec 2019 08:01  Phos  3.3       Mg     2.3         TPro  5.7<L>  /  Alb  2.5<L>  /  TBili  0.5  /  DBili  x   /  AST  28  /  ALT  21  /  AlkPhos  61  12-    PT/INR - ( 24 Dec 2019 00:38 )   PT: 13.0 sec;   INR: 1.17 ratio         PTT - ( 24 Dec 2019 00:38 )  PTT:28.6 sec  Urinalysis Basic - ( 24 Dec 2019 10:03 )    Color: Yellow / Appearance: Slightly Turbid / S.020 / pH: x  Gluc: x / Ketone: Small  / Bili: Negative / Urobili: Negative   Blood: x / Protein: 15 / Nitrite: Positive   Leuk Esterase: Moderate / RBC: 2-5 /HPF / WBC 26-50 /HPF   Sq Epi: x / Non Sq Epi: Moderate /HPF / Bacteria: Moderate /HPF      CAPILLARY BLOOD GLUCOSE          RADIOLOGY & ADDITIONAL TESTS:  < from: CT Head No Cont (19 @ 21:39) >  FINDINGS:    Head:    There is no acute intracranial hemorrhage, mass effect, midline shift, hydrocephalus or acute territorial infarction. There is chronic periventricular microvascular white matter ischemic disease.    Maxillofacial:     The periorbital and perinasal soft tissues are unremarkable. There is no proptosis. The optic globes and retrobulbar fat are unremarkable. The extraocular muscles and optic nerves are unremarkable.     The roof, floor, medial, and lateral walls of the orbits are intact. The pterygoid plates, zygomatic arches, nasal bones, nasal septum, and mandible are intact.    There is a right maxillary sinus mucosal disease. The frontal, ethmoid, left maxillary, and sphenoid sinuses are clear. The visualized portions of the mastoid air cells are clear.      IMPRESSION:     CT head: No acute intracranial hemorrhage, mass effect, or acute territorial infarction.    CT maxillofacial: No acute facial bone fractures. Right maxillary sinusitis.    If symptoms persist, MR imaging is recommended.      < end of copied text >    < from: Xray Wrist 3 Views, Left (19 @ 21:29) >  FINDINGS:  Diffuse osteopenia is noted.  There are acute or subacute-appearing distal radial and ulnar fractures. Mild impaction of the fractures is seen. There is mild dorsal placement of the distal fragments   No wrist dislocation. Associated soft tissue swelling is present.     IMPRESSION:  Acute/subacute distal radial and ulnar fractures.  ER physician is aware of the findings.        < end of copied text >  < from: Xray Chest 1 View-PORTABLE IMMEDIATE (19 @ 00:57) >  IMPRESSION: Cardiomegaly with clear lungs.      < end of copied text >  < from: Transthoracic Echocardiogram (19 @ 07:26) >  CONCLUSIONS:  1. Mitral annular calcification. Trace mitral  regurgitation.  2. Normal trileaflet aortic valve.  3. Aortic Root: 3.1 cm.  4. Normal left atrium.  LA volume index = 20 cc/m2.  5. Normal left ventricular internal dimensions and wall  thicknesses.  6. Moderate global left ventricular systolic dysfunction.  7. Grade I diastolic dysfunction (Impaired relaxation).  8. Normal right atrium.  9. Normal right ventricular size and systolic function  (TAPSE  2.6cm).  10. RV systolic pressure is mildly increased at  39 mm Hg.  11. There is trace tricuspid regurgitation.  12. Pulmonic valve not well seen.  13. Normal pericardium with no pericardial effusion.    Ejection Fraction Visual Estimate: 40-45 %    < end of copied text >    Imaging Personally Reviewed:  [ ] YES  [ ] NO    Consultant(s) Notes Reviewed:  [ ] YES  [ ] NO

## 2019-12-25 NOTE — PROGRESS NOTE ADULT - ATTENDING COMMENTS
AGree with all the above   Seen and examined. c/o of left arm pain.   Vital signs and labs reviewed   Vital Signs Last 24 Hrs  T(C): 37.4 (25 Dec 2019 17:47), Max: 37.4 (25 Dec 2019 17:47)  T(F): 99.3 (25 Dec 2019 17:47), Max: 99.3 (25 Dec 2019 17:47)  HR: 67 (25 Dec 2019 14:53) (60 - 84)  BP: 100/50 (25 Dec 2019 14:53) (95/52 - 169/70)  BP(mean): --  RR: 18 (25 Dec 2019 17:47) (18 - 18)  SpO2: 93% (25 Dec 2019 17:47) (93% - 99%)    1. Systolic heart failure - not in exacerbation   2. Left radius and ulnar distal fracture   3. Rhabdomyolysis secondary to fall   4. HTN   5. Vitamin D deficiency    Plan as above and it discussed with Dr. Valdovinos PGY1

## 2019-12-25 NOTE — PROGRESS NOTE ADULT - PROBLEM SELECTOR PLAN 5
- Prior History of Systolic HF   - Last ECHO Dec 2018 : Severe Lt systolic dysf EF 40-45%, unchanged this time  - on exam no RALES , crackles or  leg edema   - No evidence of pul edema on CXR  - ProBNP : 1954  - EKG : Sinus rhythm with PACs with LBBB . No ST-T wave changes appreciated    - admitted to TELE  - fluid restrictions 1200 mL , DASH Diet   - f/u weight daily , intake & output, keep negative  - pt takes Benazapril,  c/w Lisinopril  - c/w Lasix IV 60 mg qd  ** Cardiologist  Dr. Barber

## 2019-12-25 NOTE — PROGRESS NOTE ADULT - PROBLEM SELECTOR PLAN 1
- Patient denies chest pain  - EKG : Sinus rhythm with PACs with LBBB . No ST-T wave changes appreciated    - Troponin I 1 0.060, T2 0.074 T3 0.059  - likely demand ischemia  - Heart score: 4  - MARINA score: 2  - c/w ASA 81 mg, beta blocker and High dose statin   - TSH WNL, HbA1C: 5.8%, Lipid profile: WNL,   -  TTE: EF 40-45%, Moderate global left ventricular systolic dysfunction, Grade I diastolic dysfunction (Impaired relaxation). unchanged from 2018  - On Tele  ** Cardiologist Consulted Dr. Barber

## 2019-12-25 NOTE — DISCHARGE NOTE PROVIDER - CARE PROVIDERS DIRECT ADDRESSES
[email or web address]

## 2019-12-25 NOTE — DISCHARGE NOTE PROVIDER - PROVIDER TOKENS
PROVIDER:[TOKEN:[[id number]],FOLLOWUP:[1 week]] PROVIDER:[TOKEN:[[id number]],FOLLOWUP:[1 week]],PROVIDER:[TOKEN:[[id number]],FOLLOWUP:[1 week]]

## 2019-12-25 NOTE — DISCHARGE NOTE PROVIDER - CARE PROVIDER_API CALL
Thomas Peterson)  Orthopaedic Surgery  [address]  [place] [address]  Phone: [phone]  Fax: [phone]  Follow Up Time: 1 week Thomas Peterson)  Orthopaedic Surgery  [address]  [place] [address]  Phone: [phone]  Fax: [phone]  Follow Up Time: 1 week    Pradeep Chang)  Surgery; Surgery of the Hand  [facility] [place] [address]  Phone: [phone]  Fax: [phone]  Follow Up Time: 1 week

## 2019-12-25 NOTE — DISCHARGE NOTE PROVIDER - NSDCCPCAREPLAN_GEN_ALL_CORE_FT
PRINCIPAL DISCHARGE DIAGNOSIS  Diagnosis: Radius and ulna distal fracture, left, closed, initial encounter  Assessment and Plan of Treatment: Xray left wrist showed Radius and ulna distal fracture. Orthopedic was consulted and stated keep in splint, non weight bearing to left hand/wrist, no lifting,  keep elevated, ICE prn for pain/swelling.        SECONDARY DISCHARGE DIAGNOSES  Diagnosis: Facial contusion, initial encounter  Assessment and Plan of Treatment: CT head showed no acute intracranial hemorrhage, mass effect, or acute territorial infarction.  CT maxillofacial showed no acute facial bone fractures, right maxillary sinusitis.    Diagnosis: Rhabdomyolysis  Assessment and Plan of Treatment: CK was 1085, trended down.    Diagnosis: CHF (congestive heart failure)  Assessment and Plan of Treatment: Cardiology was consulted and echocardiogram was done, which showed EF 40-45%, Moderate global left ventricular systolic dysfunction, Grade I diastolic dysfunction (Impaired relaxation). unchanged from 2018. You were managed with  lisinopril, metoprolol, furosemide. Please follow up with your cardiologist.    Diagnosis: HTN (hypertension)  Assessment and Plan of Treatment: HTN (hypertension)    Diagnosis: Vitamin D deficiency  Assessment and Plan of Treatment: Your serum vitamin D level is low and we started supplement. Please take vitamin D pill once a week for 7 more weeks, and follow up with your primary care doctor regularly.    Diagnosis: NSTEMI (non-ST elevated myocardial infarction)  Assessment and Plan of Treatment: EKG showed sinus rhythm with PACs with LBBB . No ST-T wave changes appreciated  Troponin trended 0.060, 0.074, 0.059.    Diagnosis: Frequent falls  Assessment and Plan of Treatment: Frequent falls    Diagnosis: HTN (hypertension)  Assessment and Plan of Treatment: HTN (hypertension) PRINCIPAL DISCHARGE DIAGNOSIS  Diagnosis: Radius and ulna distal fracture, left, closed, initial encounter  Assessment and Plan of Treatment: Xray left wrist showed Radius and ulna distal fracture. Orthopedic was consulted and stated keep in splint, non weight bearing to left hand/wrist, no lifting, keep elevated, ICE prn for pain/swelling. Please follow up with hand surgeon  Dr. Thomas Peterson at [phone] or Dr Pradeep Chang [phone].        SECONDARY DISCHARGE DIAGNOSES  Diagnosis: Facial contusion, initial encounter  Assessment and Plan of Treatment: CT head showed no acute intracranial hemorrhage, mass effect, or acute territorial infarction.  CT maxillofacial showed no acute facial bone fractures, right maxillary sinusitis.    Diagnosis: Elevated troponin  Assessment and Plan of Treatment: Cardiac enzyme troponin was elevated, but trended down. We think it's due to demand ischemia by falling. Please follow up with your primary care doctor.    Diagnosis: Rhabdomyolysis  Assessment and Plan of Treatment: Creatinin kinase was high at 1085, and it trended down. This is likely from falling.    Diagnosis: CHF (congestive heart failure)  Assessment and Plan of Treatment: Cardiology was consulted and echocardiogram was done, which showed EF 40-45%, Moderate global left ventricular systolic dysfunction, Grade I diastolic dysfunction (Impaired relaxation). unchanged from 2018. You were managed with  lisinopril, metoprolol, furosemide. Please follow up with your cardiologist.    Diagnosis: Vitamin D deficiency  Assessment and Plan of Treatment: Your serum vitamin D level is low and we started supplement. Please take vitamin D pill once a week for 7 more weeks, and follow up with your primary care doctor regularly.    Diagnosis: NSTEMI (non-ST elevated myocardial infarction)  Assessment and Plan of Treatment: EKG showed sinus rhythm with PACs with LBBB . No ST-T wave changes appreciated  Troponin trended 0.060, 0.074, 0.059.    Diagnosis: Frequent falls  Assessment and Plan of Treatment: Physical therapy saw you and recommended rehabilitation facility. Please follow physical therapy in the facility.    Diagnosis: HTN (hypertension)  Assessment and Plan of Treatment: Your blood pressure is high. Please eat healthy food ( more vegetable and low fat & low salt food), and follow up with your primary care doctor.

## 2019-12-25 NOTE — PROGRESS NOTE ADULT - PROBLEM SELECTOR PLAN 3
- p/w mechanical fall  - Xray left wrist : Radius and ulna distal fracture  - fall precautions   - c/w pain meds Tylenol and Oxycodone  - c/w Senna   - PT: BRIGIDO  ** Ortho consulted:  keep in splint, non weight bearing to left hand/wrist, no lifting,  keep elevated, ICE prn for pain/swelling  follow up with Dr. Kristen Guzman (Orthopedic Hand Surgeon) in 1 week  [phone].

## 2019-12-25 NOTE — PHYSICAL THERAPY INITIAL EVALUATION ADULT - WEIGHT-BEARING RESTRICTIONS: STAND/SIT, REHAB EVAL
Pt had a abdominal CT scan with dye done at [address] on 9/27/18. Since then the pt has had a low grade fever that did spike to 103, chills, stomach pain, and vomited. There is a closed encounter from a after hours nurse also pertaining to this. Bethanie Roberto would like to speak with Dr Michael Ruelas if possible [phone]  Pharmacy is selected below. Left UE/nonweight-bearing

## 2019-12-25 NOTE — PROGRESS NOTE ADULT - PROBLEM SELECTOR PLAN 4
- p/w mechanical fall  - Pt stayed on the ground for a full 24 hours   - CK 1085.  - s/p 1 L NS in ED   - CK trended down

## 2019-12-25 NOTE — PHYSICAL THERAPY INITIAL EVALUATION ADULT - GENERAL OBSERVATIONS, REHAB EVAL
Consult received,EMR, radiology and labs reviewed. Patient received supine in bed, NAD, Left UE in a sling NWB as pre ortho. Patient agreed to EVALUATION from Physical Therapist.

## 2019-12-26 ENCOUNTER — TRANSCRIPTION ENCOUNTER (OUTPATIENT)
Age: 84
End: 2019-12-26

## 2019-12-26 LAB
ANION GAP SERPL CALC-SCNC: 4 MMOL/L — LOW (ref 5–17)
BASOPHILS # BLD AUTO: 0.05 K/UL — SIGNIFICANT CHANGE UP (ref 0–0.2)
BASOPHILS NFR BLD AUTO: 0.6 % — SIGNIFICANT CHANGE UP (ref 0–2)
BUN SERPL-MCNC: 24 MG/DL — HIGH (ref 7–18)
CALCIUM SERPL-MCNC: 8.6 MG/DL — SIGNIFICANT CHANGE UP (ref 8.4–10.5)
CHLORIDE SERPL-SCNC: 108 MMOL/L — SIGNIFICANT CHANGE UP (ref 96–108)
CO2 SERPL-SCNC: 29 MMOL/L — SIGNIFICANT CHANGE UP (ref 22–31)
CREAT SERPL-MCNC: 0.87 MG/DL — SIGNIFICANT CHANGE UP (ref 0.5–1.3)
EOSINOPHIL # BLD AUTO: 0.38 K/UL — SIGNIFICANT CHANGE UP (ref 0–0.5)
EOSINOPHIL NFR BLD AUTO: 4.6 % — SIGNIFICANT CHANGE UP (ref 0–6)
GLUCOSE SERPL-MCNC: 100 MG/DL — HIGH (ref 70–99)
HCT VFR BLD CALC: 37 % — SIGNIFICANT CHANGE UP (ref 34.5–45)
HGB BLD-MCNC: 12.3 G/DL — SIGNIFICANT CHANGE UP (ref 11.5–15.5)
IMM GRANULOCYTES NFR BLD AUTO: 0.1 % — SIGNIFICANT CHANGE UP (ref 0–1.5)
LYMPHOCYTES # BLD AUTO: 2.88 K/UL — SIGNIFICANT CHANGE UP (ref 1–3.3)
LYMPHOCYTES # BLD AUTO: 34.8 % — SIGNIFICANT CHANGE UP (ref 13–44)
MAGNESIUM SERPL-MCNC: 2.3 MG/DL — SIGNIFICANT CHANGE UP (ref 1.6–2.6)
MCHC RBC-ENTMCNC: 30.8 PG — SIGNIFICANT CHANGE UP (ref 27–34)
MCHC RBC-ENTMCNC: 33.2 GM/DL — SIGNIFICANT CHANGE UP (ref 32–36)
MCV RBC AUTO: 92.5 FL — SIGNIFICANT CHANGE UP (ref 80–100)
MONOCYTES # BLD AUTO: 1.04 K/UL — HIGH (ref 0–0.9)
MONOCYTES NFR BLD AUTO: 12.6 % — SIGNIFICANT CHANGE UP (ref 2–14)
NEUTROPHILS # BLD AUTO: 3.92 K/UL — SIGNIFICANT CHANGE UP (ref 1.8–7.4)
NEUTROPHILS NFR BLD AUTO: 47.3 % — SIGNIFICANT CHANGE UP (ref 43–77)
NRBC # BLD: 0 /100 WBCS — SIGNIFICANT CHANGE UP (ref 0–0)
PLATELET # BLD AUTO: 254 K/UL — SIGNIFICANT CHANGE UP (ref 150–400)
POTASSIUM SERPL-MCNC: 3.8 MMOL/L — SIGNIFICANT CHANGE UP (ref 3.5–5.3)
POTASSIUM SERPL-SCNC: 3.8 MMOL/L — SIGNIFICANT CHANGE UP (ref 3.5–5.3)
RBC # BLD: 4 M/UL — SIGNIFICANT CHANGE UP (ref 3.8–5.2)
RBC # FLD: 14.4 % — SIGNIFICANT CHANGE UP (ref 10.3–14.5)
SODIUM SERPL-SCNC: 141 MMOL/L — SIGNIFICANT CHANGE UP (ref 135–145)
WBC # BLD: 8.28 K/UL — SIGNIFICANT CHANGE UP (ref 3.8–10.5)
WBC # FLD AUTO: 8.28 K/UL — SIGNIFICANT CHANGE UP (ref 3.8–10.5)

## 2019-12-26 PROCEDURE — 99232 SBSQ HOSP IP/OBS MODERATE 35: CPT | Mod: GC

## 2019-12-26 RX ADMIN — Medication 81 MILLIGRAM(S): at 13:01

## 2019-12-26 RX ADMIN — HEPARIN SODIUM 5000 UNIT(S): 5000 INJECTION INTRAVENOUS; SUBCUTANEOUS at 22:12

## 2019-12-26 RX ADMIN — SENNA PLUS 2 TABLET(S): 8.6 TABLET ORAL at 22:11

## 2019-12-26 RX ADMIN — HEPARIN SODIUM 5000 UNIT(S): 5000 INJECTION INTRAVENOUS; SUBCUTANEOUS at 06:35

## 2019-12-26 RX ADMIN — Medication 1000 UNIT(S): at 13:01

## 2019-12-26 RX ADMIN — LISINOPRIL 40 MILLIGRAM(S): 2.5 TABLET ORAL at 06:35

## 2019-12-26 RX ADMIN — Medication 25 MILLIGRAM(S): at 19:28

## 2019-12-26 RX ADMIN — HEPARIN SODIUM 5000 UNIT(S): 5000 INJECTION INTRAVENOUS; SUBCUTANEOUS at 13:01

## 2019-12-26 RX ADMIN — Medication 25 MILLIGRAM(S): at 06:35

## 2019-12-26 RX ADMIN — Medication 60 MILLIGRAM(S): at 06:34

## 2019-12-26 RX ADMIN — ESCITALOPRAM OXALATE 10 MILLIGRAM(S): 10 TABLET, FILM COATED ORAL at 13:01

## 2019-12-26 RX ADMIN — ATORVASTATIN CALCIUM 40 MILLIGRAM(S): 80 TABLET, FILM COATED ORAL at 22:11

## 2019-12-26 NOTE — CHART NOTE - NSCHARTNOTEFT_GEN_A_CORE
Follow up Information for Hand surgeon    > Consultant:      Follow-up with Dr. Thomas Peterson at [phone]      OR     Follow up with Dr Pradeep Chang [phone]

## 2019-12-26 NOTE — DISCHARGE NOTE NURSING/CASE MANAGEMENT/SOCIAL WORK - NSDCFUADDAPPT_GEN_ALL_CORE_FT
Please follow up with hand surgeon  Dr. Thomas Peterson at [phone] or   Dr Pradeep Chang [phone].

## 2019-12-26 NOTE — PROGRESS NOTE ADULT - PROBLEM SELECTOR PLAN 6
- Pt taking Benazapril and Lasix at home    - c/w Benazapril and Lasix with parameters  - Monitor BP closely and adjust medications if clinically indicated.  - c/w lisinopril 40mg daily and lasix 60mg po DAIILY  - DASH diet.

## 2019-12-26 NOTE — PROGRESS NOTE ADULT - PROBLEM SELECTOR PLAN 5
- Prior History of Systolic HF   - Last ECHO Dec 2018 : Severe Lt systolic dysf EF 40-45%, unchanged this time   - No evidence of pul edema on CXR  - ProBNP : 1954  - EKG : Sinus rhythm with PACs with LBBB . No ST-T wave changes appreciated    - fluid restrictions 1200 mL , DASH Diet   - pt takes Benazapril,  c/w Lisinopril  - c/w Lasix PO 60 mg qd  - D/Cd tele  - f/u weight daily , intake & output, keep negative  ** Cardiologist  Dr. Barber

## 2019-12-26 NOTE — DISCHARGE NOTE NURSING/CASE MANAGEMENT/SOCIAL WORK - PATIENT PORTAL LINK FT
You can access the FollowMyHealth Patient Portal offered by [facility] by registering at the following website: [email or web address]. By joining [facility]’s FollowMyHealth portal, you will also be able to view your health information using other applications (apps) compatible with our system.

## 2019-12-26 NOTE — PROGRESS NOTE ADULT - PROBLEM SELECTOR PLAN 3
- p/w mechanical fall  - Xray left wrist : Radius and ulna distal fracture  - fall precautions   - c/w pain meds Tylenol and Oxycodone  - c/w Senna   - PT: BRIGIDO  ** Ortho consulted:  keep in splint, non weight bearing to left hand/wrist, no lifting,  keep elevated, ICE prn for pain/swelling  follow up with Dr. Kristen Guzman (Orthopedic Hand Surgeon) in 1 week  [phone].

## 2019-12-26 NOTE — PROGRESS NOTE ADULT - PROBLEM SELECTOR PLAN 2
- Patient presents s/p multiple falls associated with weakness, no LOC, headache, dizziness, chest pain, SOB.  - likely mechanical, could be due to deconditioning, weakness as patient on lasix  - EKG : Sinus rhythm with PACs with LBBB . No ST-T wave changes appreciated    - CT head: No acute intracranial hemorrhage, mass effect, or acute territorial infarction.  - Fall Precaution   - d/cD Tele  - PT eval: BRIGIDO

## 2019-12-26 NOTE — PROGRESS NOTE ADULT - ASSESSMENT
89 year old female from home lives alone with PMHx of HFrEF(on Lasix), HTN (on Benazepril) , chronic venous stasis,  disc herniation and Anxiety (on Lexapro) and no significant PSHx presents to ED s/p unwitnessed mechanical fall yesterday. Pt c/o L wrist pain. PT was admitted for left wrist fracture and safe placement.      EKG : Sinus rhythm with PACs with LBBB . No ST-T wave changes appreciated    Trops 0.06  CT head: No acute intracranial hemorrhage, mass effect, or acute territorial infarction.  CT maxillofacial: No acute facial bone fractures. Right maxillary sinusitis.  CK 1085  Xray left wrist : Radius and ulna distal fracture    Pt is DNR/DNI  MOLST form filled in chart pending attending signature    Pt is admitted for management of Frequent falls and Radius and ulna distal fracture and to r/o ACS     Awaiting family's choice of rehab and auth

## 2019-12-26 NOTE — PROGRESS NOTE ADULT - PROBLEM SELECTOR PLAN 4
- p/w mechanical fall  - Pt stayed on the ground for a full 24 hours   - CK 1085, s/p 1 L NS in ED, trended down

## 2019-12-26 NOTE — PROGRESS NOTE ADULT - SUBJECTIVE AND OBJECTIVE BOX
PGY1 Note discussed with supervising resident and primary attending.    Patient is a 89y old  Female who presents with a chief complaint of Fall (25 Dec 2019 13:25)      INTERVAL HPI/OVERNIGHT EVENTS:    MEDICATIONS  (STANDING):  aspirin  chewable 81 milliGRAM(s) Oral daily  atorvastatin 40 milliGRAM(s) Oral at bedtime  cholecalciferol 1000 Unit(s) Oral daily  escitalopram 10 milliGRAM(s) Oral daily  furosemide    Tablet 60 milliGRAM(s) Oral daily  heparin  Injectable 5000 Unit(s) SubCutaneous every 8 hours  lisinopril 40 milliGRAM(s) Oral daily  metoprolol tartrate 25 milliGRAM(s) Oral two times a day  senna 2 Tablet(s) Oral at bedtime  sodium chloride 0.9%. 1000 milliLiter(s) (50 mL/Hr) IV Continuous <Continuous>    MEDICATIONS  (PRN):  acetaminophen   Tablet .. 650 milliGRAM(s) Oral every 6 hours PRN Mild Pain (1 - 3)      Allergies    No Known Allergies    Intolerances          REVIEW OF SYSTEMS:  CONSTITUTIONAL: No fever, weight loss, or fatigue  RESPIRATORY: No cough, wheezing, chills or hemoptysis; No shortness of breath  CARDIOVASCULAR: No chest pain, palpitations, dizziness, or leg swelling  GASTROINTESTINAL: No abdominal or epigastric pain. No nausea, vomiting, or hematemesis; No diarrhea or constipation. No melena or hematochezia.  NEUROLOGICAL: No headaches, memory loss, loss of strength, numbness, or tremors (+) cast on L wrist and fore arm  SKIN: No itching, burning, rashes, or lesions     Vital Signs Last 24 Hrs  T(C): 36.6 (26 Dec 2019 05:13), Max: 37.4 (25 Dec 2019 17:47)  T(F): 97.8 (26 Dec 2019 05:13), Max: 99.3 (25 Dec 2019 17:47)  HR: 60 (26 Dec 2019 10:35) (60 - 89)  BP: 118/65 (26 Dec 2019 10:35) (95/52 - 165/95)  BP(mean): --  RR: 18 (26 Dec 2019 05:13) (18 - 19)  SpO2: 94% (26 Dec 2019 10:35) (93% - 98%)      PHYSICAL EXAM:  GENERAL: Anxious, well-groomed, well-developed  HEAD:  Atraumatic, Normocephalic   EYES: EOMI, PERRLA, conjunctiva and sclera clear  NECK: Supple, No JVD, Normal thyroid  CHEST/LUNG: Clear to percussion bilaterally; No rales, rhonchi, wheezing, or rubs  HEART: Regular rate and rhythm; No murmurs, rubs, or gallops  ABDOMEN: Soft, Nontender, Nondistended; Bowel sounds present  NERVOUS SYSTEM:  Alert & Oriented X3, Good concentration; Motor Strength 5/5 B/L   EXTREMITIES:  2+ Peripheral Pulses, No clubbing, cyanosis, or edema (+) cast on L wrist and fore arm  SKIN; (+) bruise by left lip      LABS:                        12.3   8.28  )-----------( 254      ( 26 Dec 2019 07:16 )             37.0     12-26    141  |  108  |  24<H>  ----------------------------<  100<H>  3.8   |  29  |  0.87    Ca    8.6      26 Dec 2019 07:16  Phos  3.3     12-25  Mg     2.3     12-26    TPro  5.7<L>  /  Alb  2.5<L>  /  TBili  0.5  /  DBili  x   /  AST  28  /  ALT  21  /  AlkPhos  61  12-25        CAPILLARY BLOOD GLUCOSE          RADIOLOGY & ADDITIONAL TESTS:    Imaging Personally Reviewed:  [ ] YES  [ ] NO    Consultant(s) Notes Reviewed:  [ ] YES  [ ] NO

## 2019-12-27 VITALS
DIASTOLIC BLOOD PRESSURE: 73 MMHG | TEMPERATURE: 98 F | WEIGHT: 157.19 LBS | SYSTOLIC BLOOD PRESSURE: 149 MMHG | HEART RATE: 62 BPM | OXYGEN SATURATION: 100 % | RESPIRATION RATE: 18 BRPM

## 2019-12-27 LAB
ANION GAP SERPL CALC-SCNC: 5 MMOL/L — SIGNIFICANT CHANGE UP (ref 5–17)
BUN SERPL-MCNC: 23 MG/DL — HIGH (ref 7–18)
CALCIUM SERPL-MCNC: 9.2 MG/DL — SIGNIFICANT CHANGE UP (ref 8.4–10.5)
CHLORIDE SERPL-SCNC: 106 MMOL/L — SIGNIFICANT CHANGE UP (ref 96–108)
CK SERPL-CCNC: 136 U/L — SIGNIFICANT CHANGE UP (ref 21–215)
CO2 SERPL-SCNC: 30 MMOL/L — SIGNIFICANT CHANGE UP (ref 22–31)
CREAT SERPL-MCNC: 0.84 MG/DL — SIGNIFICANT CHANGE UP (ref 0.5–1.3)
GLUCOSE SERPL-MCNC: 98 MG/DL — SIGNIFICANT CHANGE UP (ref 70–99)
POTASSIUM SERPL-MCNC: 3.7 MMOL/L — SIGNIFICANT CHANGE UP (ref 3.5–5.3)
POTASSIUM SERPL-SCNC: 3.7 MMOL/L — SIGNIFICANT CHANGE UP (ref 3.5–5.3)
SODIUM SERPL-SCNC: 141 MMOL/L — SIGNIFICANT CHANGE UP (ref 135–145)

## 2019-12-27 PROCEDURE — 85730 THROMBOPLASTIN TIME PARTIAL: CPT

## 2019-12-27 PROCEDURE — 97530 THERAPEUTIC ACTIVITIES: CPT

## 2019-12-27 PROCEDURE — 85027 COMPLETE CBC AUTOMATED: CPT

## 2019-12-27 PROCEDURE — 70486 CT MAXILLOFACIAL W/O DYE: CPT

## 2019-12-27 PROCEDURE — 83880 ASSAY OF NATRIURETIC PEPTIDE: CPT

## 2019-12-27 PROCEDURE — 97163 PT EVAL HIGH COMPLEX 45 MIN: CPT

## 2019-12-27 PROCEDURE — 29125 APPL SHORT ARM SPLINT STATIC: CPT | Mod: LT

## 2019-12-27 PROCEDURE — 99238 HOSP IP/OBS DSCHRG MGMT 30/<: CPT

## 2019-12-27 PROCEDURE — 97116 GAIT TRAINING THERAPY: CPT

## 2019-12-27 PROCEDURE — 82550 ASSAY OF CK (CPK): CPT

## 2019-12-27 PROCEDURE — 84207 ASSAY OF VITAMIN B-6: CPT

## 2019-12-27 PROCEDURE — 82553 CREATINE MB FRACTION: CPT

## 2019-12-27 PROCEDURE — 83036 HEMOGLOBIN GLYCOSYLATED A1C: CPT

## 2019-12-27 PROCEDURE — 83735 ASSAY OF MAGNESIUM: CPT

## 2019-12-27 PROCEDURE — 82607 VITAMIN B-12: CPT

## 2019-12-27 PROCEDURE — 82306 VITAMIN D 25 HYDROXY: CPT

## 2019-12-27 PROCEDURE — 93005 ELECTROCARDIOGRAM TRACING: CPT

## 2019-12-27 PROCEDURE — 83605 ASSAY OF LACTIC ACID: CPT

## 2019-12-27 PROCEDURE — 84443 ASSAY THYROID STIM HORMONE: CPT

## 2019-12-27 PROCEDURE — 84484 ASSAY OF TROPONIN QUANT: CPT

## 2019-12-27 PROCEDURE — 71045 X-RAY EXAM CHEST 1 VIEW: CPT

## 2019-12-27 PROCEDURE — 70450 CT HEAD/BRAIN W/O DYE: CPT

## 2019-12-27 PROCEDURE — 80048 BASIC METABOLIC PNL TOTAL CA: CPT

## 2019-12-27 PROCEDURE — 85610 PROTHROMBIN TIME: CPT

## 2019-12-27 PROCEDURE — 73110 X-RAY EXAM OF WRIST: CPT

## 2019-12-27 PROCEDURE — 81001 URINALYSIS AUTO W/SCOPE: CPT

## 2019-12-27 PROCEDURE — 80061 LIPID PANEL: CPT

## 2019-12-27 PROCEDURE — 80053 COMPREHEN METABOLIC PANEL: CPT

## 2019-12-27 PROCEDURE — 93306 TTE W/DOPPLER COMPLETE: CPT

## 2019-12-27 PROCEDURE — 99285 EMERGENCY DEPT VISIT HI MDM: CPT | Mod: 25

## 2019-12-27 PROCEDURE — 97110 THERAPEUTIC EXERCISES: CPT

## 2019-12-27 PROCEDURE — 82746 ASSAY OF FOLIC ACID SERUM: CPT

## 2019-12-27 PROCEDURE — 84100 ASSAY OF PHOSPHORUS: CPT

## 2019-12-27 PROCEDURE — 36415 COLL VENOUS BLD VENIPUNCTURE: CPT

## 2019-12-27 RX ORDER — ACETAMINOPHEN 500 MG
2 TABLET ORAL
Qty: 240 | Refills: 0
Start: 2019-12-27 | End: 2020-01-25

## 2019-12-27 RX ORDER — CHOLECALCIFEROL (VITAMIN D3) 125 MCG
1000 CAPSULE ORAL
Qty: 30 | Refills: 0
Start: 2019-12-27 | End: 2020-01-25

## 2019-12-27 RX ORDER — SENNA PLUS 8.6 MG/1
2 TABLET ORAL
Qty: 60 | Refills: 0
Start: 2019-12-27 | End: 2020-01-25

## 2019-12-27 RX ORDER — METOPROLOL TARTRATE 50 MG
1 TABLET ORAL
Qty: 30 | Refills: 0
Start: 2019-12-27 | End: 2020-01-25

## 2019-12-27 RX ORDER — LISINOPRIL 2.5 MG/1
1 TABLET ORAL
Qty: 30 | Refills: 0
Start: 2019-12-27 | End: 2020-01-25

## 2019-12-27 RX ORDER — ATORVASTATIN CALCIUM 80 MG/1
1 TABLET, FILM COATED ORAL
Qty: 30 | Refills: 0
Start: 2019-12-27 | End: 2020-01-25

## 2019-12-27 RX ORDER — FUROSEMIDE 40 MG
3 TABLET ORAL
Qty: 90 | Refills: 0
Start: 2019-12-27 | End: 2020-01-25

## 2019-12-27 RX ORDER — ASPIRIN/CALCIUM CARB/MAGNESIUM 324 MG
1 TABLET ORAL
Qty: 30 | Refills: 0
Start: 2019-12-27 | End: 2020-01-25

## 2019-12-27 RX ADMIN — Medication 25 MILLIGRAM(S): at 06:31

## 2019-12-27 RX ADMIN — LISINOPRIL 40 MILLIGRAM(S): 2.5 TABLET ORAL at 06:31

## 2019-12-27 RX ADMIN — Medication 60 MILLIGRAM(S): at 06:32

## 2019-12-27 RX ADMIN — HEPARIN SODIUM 5000 UNIT(S): 5000 INJECTION INTRAVENOUS; SUBCUTANEOUS at 06:31

## 2019-12-29 LAB — PYRIDOXAL PHOS SERPL-MCNC: 4.6 UG/L — SIGNIFICANT CHANGE UP (ref 2–32.8)

## 2020-01-02 PROBLEM — F41.9 ANXIETY DISORDER, UNSPECIFIED: Chronic | Status: ACTIVE | Noted: 2019-12-24

## 2020-01-02 PROBLEM — I10 ESSENTIAL (PRIMARY) HYPERTENSION: Chronic | Status: ACTIVE | Noted: 2019-12-24

## 2020-01-02 PROBLEM — I50.9 HEART FAILURE, UNSPECIFIED: Chronic | Status: ACTIVE | Noted: 2019-12-24

## 2020-01-06 PROBLEM — Z00.00 ENCOUNTER FOR PREVENTIVE HEALTH EXAMINATION: Status: ACTIVE | Noted: 2020-01-06

## 2020-01-14 ENCOUNTER — APPOINTMENT (OUTPATIENT)
Dept: ORTHOPEDIC SURGERY | Facility: CLINIC | Age: 85
End: 2020-01-14
Payer: MEDICARE

## 2020-01-14 DIAGNOSIS — Z86.79 PERSONAL HISTORY OF OTHER DISEASES OF THE CIRCULATORY SYSTEM: ICD-10-CM

## 2020-01-14 DIAGNOSIS — Z87.2 PERSONAL HISTORY OF DISEASES OF THE SKIN AND SUBCUTANEOUS TISSUE: ICD-10-CM

## 2020-01-14 PROCEDURE — 99203 OFFICE O/P NEW LOW 30 MIN: CPT | Mod: 25

## 2020-01-14 PROCEDURE — 73110 X-RAY EXAM OF WRIST: CPT | Mod: TC,LT

## 2020-01-14 PROCEDURE — 29075 APPL CST ELBW FNGR SHORT ARM: CPT | Mod: LT

## 2020-01-14 RX ORDER — ACETAMINOPHEN 325 MG/1
TABLET, FILM COATED ORAL
Refills: 0 | Status: ACTIVE | COMMUNITY

## 2020-01-14 RX ORDER — CHOLECALCIFEROL (VITAMIN D3) 25 MCG
TABLET ORAL
Refills: 0 | Status: ACTIVE | COMMUNITY

## 2020-01-14 RX ORDER — ESCITALOPRAM OXALATE 5 MG/1
TABLET, FILM COATED ORAL
Refills: 0 | Status: ACTIVE | COMMUNITY

## 2020-01-14 RX ORDER — BENAZEPRIL HYDROCHLORIDE 5 MG/1
TABLET ORAL
Refills: 0 | Status: ACTIVE | COMMUNITY

## 2020-01-14 NOTE — DISCUSSION/SUMMARY
[FreeTextEntry1] : She has findings consistent with a left distal radius and ulna fracture after a fall 3 weeks ago.  The overall alignment is acceptable.\par \par I had a discussion regarding today's visit, the diagnosis, and treatment recommendations / options.  I recommended placement of a short arm cast today and aggressive range of motion exercises to the digits.\par \par The patient and his son and daughter-in-law have agreed to this plan of management and has expressed full understanding.  All questions were fully answered to their satisfaction.\par \par Over 50% of the time spent with the patient was on counseling the patient on the above diagnosis, treatment plan and prognosis.

## 2020-01-14 NOTE — PHYSICAL EXAM
[de-identified] : PA, lateral and oblique radiographs of her left wrist demonstrate a distal radius and ulna fracture.  There is minimal displacement but overall acceptable alignment.  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 [de-identified] : - Constitutional: This is an elderly female in no obvious distress.  She was accompanied by her son and daughter-in-law today.  She is in a wheelchair.\par - Psych: Patient is alert and oriented to person, place and time.  Patient has a normal mood and affect.\par - Cardiovascular: Normal pulses throughout the upper extremities.  No significant varicosities are noted in the upper extremities. \par - Neuro: Strength and sensation are intact throughout the upper extremities.  Patient has normal coordination.\par \par ---\par \par Examination of her left wrist demonstrates a splint to be quite tight and immobilizing the digits.  Was removed.  There is diffuse swelling of the wrist.  There is mild tenderness along the distal radius dorsally.  There are no breaks in the skin.  She has limited flexion and extension of the digits.  Grossly, she is neurovascularly intact distally.

## 2020-01-14 NOTE — HISTORY OF PRESENT ILLNESS
[Right] : right hand dominant [FreeTextEntry1] : She comes in today for evaluation of a left wrist fracture she fell 3 weeks ago.  She was admitted to the hospital and is now in a rehab facility.  She denies other injuries.  She has some pain.\par \par She was accompanied by her son and daughter-in-law today.

## 2020-01-14 NOTE — PROCEDURE
[FreeTextEntry1] : She was placed into a well-padded and well molded left short arm fiberglass cast.  She was instructed on cast care elevation and range of motion exercises to the digits.  I wrote her a referral for therapy at the rehab facility.  She will follow-up in 3 weeks for x-rays out of plaster.  She will follow-up before then if she has having any problems.

## 2020-01-15 ENCOUNTER — APPOINTMENT (OUTPATIENT)
Dept: ORTHOPEDIC SURGERY | Facility: CLINIC | Age: 85
End: 2020-01-15
Payer: MEDICARE

## 2020-01-15 PROCEDURE — 99213 OFFICE O/P EST LOW 20 MIN: CPT

## 2020-01-15 NOTE — ADDENDUM
[FreeTextEntry1] : I, Harris Gonzalez, acted solely as a scribe for Dr. Israel Nguyen on 01/15/2020 . \par \par All medical record entries made by the Scribe were at my, Dr. Israel Nguyen, direction and personally dictated by me on 01/15/2020. I have personally reviewed the chart and agree that the record accurately reflects my personal performance of the history, physical exam, assessment and plan.

## 2020-01-15 NOTE — PHYSICAL EXAM
[de-identified] : - Constitutional: This is an elderly female in no obvious distress.  She was accompanied by her daughter-in-law today.  She is in a wheelchair.\par - Psych: Patient is alert and oriented to person, place and time.  Patient has a normal mood and affect.\par - Cardiovascular: Normal pulses throughout the upper extremities.  No significant varicosities are noted in the upper extremities. \par - Neuro: Strength and sensation are intact throughout the upper extremities.  Patient has normal coordination.\par \par ---\par \par Examination of her left wrist demonstrates demonstrates her cast to be quite tight.  There is diffuse swelling of the digits.  Her fingers are warm to the touch.  She remains neurovascularly intact distally.

## 2020-01-15 NOTE — HISTORY OF PRESENT ILLNESS
[FreeTextEntry1] : 22 days status post left distal radius fracture.  See note from when she was seen in the office 1 week ago.  She was casted.\par \par She returns today, with swelling of the left wrist and hand.  This is increased.\par \par She was accompanied by a worker from the rehab facility.

## 2020-01-15 NOTE — REVIEW OF SYSTEMS
Plan: If lesion persists or changes, patient advised RTC Detail Level: Detailed [Right] : right Initiate Treatment: Triamcinolone .1% cream ( has) twice daily for up to 10 days. If not resolved will biopsy. [Negative] : Allergic/Immunologic

## 2020-01-15 NOTE — DISCUSSION/SUMMARY
[FreeTextEntry1] : I had a discussion regarding today's visit, the diagnosis and treatment recommendations / options.  I split the cast radially and ulnarly and completely opened it ulnarly.  She was much more comfortable.  It was loosely taped.  She and her aide were instructed on strict elevation and range of motion exercises to the digits.  She will follow-up in 3 weeks as previously scheduled.  If there are any problems or persistent swelling, then she was instructed to return to the office immediately.\par \par The patient has agreed to the above plan of management and has expressed full understanding.  All questions were fully answered to the patient's satisfaction.\par \par I spent at least 25 minutes of face-to-face time with the patient.  Over 50% of this time was spent on counseling the patient on the above diagnosis, treatment plan and prognosis.

## 2020-02-05 ENCOUNTER — APPOINTMENT (OUTPATIENT)
Dept: ORTHOPEDIC SURGERY | Facility: CLINIC | Age: 85
End: 2020-02-05
Payer: MEDICARE

## 2020-02-05 PROCEDURE — 73110 X-RAY EXAM OF WRIST: CPT | Mod: LT

## 2020-02-05 PROCEDURE — 99213 OFFICE O/P EST LOW 20 MIN: CPT

## 2020-02-05 NOTE — DISCUSSION/SUMMARY
[FreeTextEntry1] : I had a discussion regarding today's visit, the diagnosis and treatment recommendations / options.  At this time she can remain out of the cast.  She was fitted with a splint.  She was instructed on protection and gentle range of motion exercises. I also provided a prescription for physical therapy.  She is still in a rehab facility.  She will follow-up in 3 weeks.\par \par The patient has agreed to the above plan of management and has expressed full understanding.  All questions were fully answered to the patient's satisfaction.\par \par I spent at least 25 minutes of face-to-face time with the patient.  Over 50% of this time was spent on counseling the patient on the above diagnosis, treatment plan and prognosis.

## 2020-02-05 NOTE — HISTORY OF PRESENT ILLNESS
[FreeTextEntry1] : 6 weeks status post left distal radius fracture.  \par \par She is doing well. There is some stiffness in her hand. \par \par She was accompanied by her son and daughter-in-law today.

## 2020-02-05 NOTE — ADDENDUM
[FreeTextEntry1] : I, Harris Gonzalez, acted solely as a scribe for Dr. Israel Nguyen on 02/05/2020 . \par \par All medical record entries made by the Scribe were at my, Dr. Israel Nguyen, direction and personally dictated by me on 02/05/2020. I have personally reviewed the chart and agree that the record accurately reflects my personal performance of the history, physical exam, assessment and plan.

## 2020-02-05 NOTE — PHYSICAL EXAM
[de-identified] : - Constitutional: This is an elderly female in no obvious distress.  She was accompanied by her son and daughter-in-law today.  She is in a wheelchair.\par - Psych: Patient is alert and oriented to person, place and time.  Patient has a normal mood and affect.\par - Cardiovascular: Normal pulses throughout the upper extremities.  No significant varicosities are noted in the upper extremities. \par - Neuro: Strength and sensation are intact throughout the upper extremities.  Patient has normal coordination.\par \par ---\par \par Examination of her left wrist after the cast was removed demonstrates residual although decreased swelling.  There is limitation of flexion and extension of the digits.  She has 20 degrees of wrist flexion and extension.  She remains neurovascularly intact distally. [de-identified] : PA, lateral and oblique radiographs of her left wrist demonstrate her distal radius and ulna fractures to be healing, in acceptable alignment.

## 2020-02-26 ENCOUNTER — APPOINTMENT (OUTPATIENT)
Dept: ORTHOPEDIC SURGERY | Facility: CLINIC | Age: 85
End: 2020-02-26
Payer: MEDICARE

## 2020-02-26 DIAGNOSIS — S52.502A UNSPECIFIED FRACTURE OF THE LOWER END OF LEFT RADIUS, INITIAL ENCOUNTER FOR CLOSED FRACTURE: ICD-10-CM

## 2020-02-26 PROCEDURE — 99213 OFFICE O/P EST LOW 20 MIN: CPT

## 2020-02-26 PROCEDURE — 73110 X-RAY EXAM OF WRIST: CPT | Mod: LT

## 2020-02-26 NOTE — HISTORY OF PRESENT ILLNESS
[FreeTextEntry1] : 9 weeks status post left distal radius fracture.  \par \par She is doing well. There is some stiffness and swelling in her hand. \par \par She was accompanied by her son and daughter-in-law today.

## 2020-02-26 NOTE — ADDENDUM
[FreeTextEntry1] : I, Harris Gonzalez, acted solely as a scribe for Dr. Israel Nguyen on 02/26/2020 . \par \par All medical record entries made by the Scribe were at my, Dr. Israel Nguyen, direction and personally dictated by me on 02/26/2020. I have personally reviewed the chart and agree that the record accurately reflects my personal performance of the history, physical exam, assessment and plan.

## 2020-02-26 NOTE — DISCUSSION/SUMMARY
[FreeTextEntry1] : I had a discussion regarding today's visit, the diagnosis and treatment recommendations / options.  She no longer requires the splint.  She and her daughter-in-law and son were instructed on continued physical therapy in addition to home exercises.  She will follow-up according to her symptoms in the future.\par \par The patient has agreed to the above plan of management and has expressed full understanding.  All questions were fully answered to the patient's satisfaction.\par \par I spent at least 25 minutes of face-to-face time with the patient.  Over 50% of this time was spent on counseling the patient on the above diagnosis, treatment plan and prognosis.

## 2020-02-26 NOTE — PHYSICAL EXAM
[de-identified] : - Constitutional: This is an elderly female in no obvious distress.  She was accompanied by her son and daughter-in-law today.  She is in a wheelchair.\par - Psych: Patient is alert and oriented to person, place and time.  Patient has a normal mood and affect.\par - Cardiovascular: Normal pulses throughout the upper extremities.  No significant varicosities are noted in the upper extremities. \par - Neuro: Strength and sensation are intact throughout the upper extremities.  Patient has normal coordination.\par \par ---\par \par Examination of her left wrist demonstrates residual although residual although decreased swelling.  There is limitation of flexion and extension of the digits.  However, she is improved in this regard.  She does have underlying arthritis of the digits.  She has 30 degrees of wrist flexion and extension.  She remains neurovascularly intact distally. [de-identified] : PA, lateral and oblique radiographs of her left wrist demonstrate her distal radius and ulna fractures to be healed, in good overall alignment.

## 2020-04-02 NOTE — H&P ADULT - PROBLEM/PLAN-3
Unsustained, restarted on metoprolol 4/1  Now having longer sinus pauses (longest so far 2.9 sec), asymptomatic  Had Echo 3/28, normal EF  Will consult cardiology today   DISPLAY PLAN FREE TEXT

## 2020-11-21 NOTE — PROGRESS NOTE ADULT - PROBLEM SELECTOR PLAN 7
- vitamin D level low 19.9  c/w vit D3 1000U daily
- vitamin D level low 19.9  c/w vit D3 1000U daily
Patient

## 2021-03-10 ENCOUNTER — INPATIENT (INPATIENT)
Facility: HOSPITAL | Age: 86
LOS: 4 days | Discharge: NURSING, REHAB OR LONG-TERM CARE | DRG: 300 | End: 2021-03-15
Attending: HOSPITALIST | Admitting: HOSPITALIST
Payer: MEDICARE

## 2021-03-10 VITALS
RESPIRATION RATE: 16 BRPM | OXYGEN SATURATION: 93 % | HEIGHT: 63 IN | DIASTOLIC BLOOD PRESSURE: 54 MMHG | WEIGHT: 149.91 LBS | TEMPERATURE: 98 F | SYSTOLIC BLOOD PRESSURE: 112 MMHG | HEART RATE: 60 BPM

## 2021-03-10 DIAGNOSIS — L98.499 NON-PRESSURE CHRONIC ULCER OF SKIN OF OTHER SITES WITH UNSPECIFIED SEVERITY: ICD-10-CM

## 2021-03-10 LAB
ACETONE SERPL-MCNC: NEGATIVE — SIGNIFICANT CHANGE UP
ALBUMIN SERPL ELPH-MCNC: 2.9 G/DL — LOW (ref 3.5–5)
ALP SERPL-CCNC: 119 U/L — SIGNIFICANT CHANGE UP (ref 40–120)
ALT FLD-CCNC: 43 U/L DA — SIGNIFICANT CHANGE UP (ref 10–60)
ANION GAP SERPL CALC-SCNC: 7 MMOL/L — SIGNIFICANT CHANGE UP (ref 5–17)
APTT BLD: 31.8 SEC — SIGNIFICANT CHANGE UP (ref 27.5–35.5)
AST SERPL-CCNC: 39 U/L — SIGNIFICANT CHANGE UP (ref 10–40)
BASOPHILS # BLD AUTO: 0.06 K/UL — SIGNIFICANT CHANGE UP (ref 0–0.2)
BASOPHILS NFR BLD AUTO: 0.7 % — SIGNIFICANT CHANGE UP (ref 0–2)
BILIRUB SERPL-MCNC: 0.3 MG/DL — SIGNIFICANT CHANGE UP (ref 0.2–1.2)
BUN SERPL-MCNC: 60 MG/DL — HIGH (ref 7–18)
CALCIUM SERPL-MCNC: 9.2 MG/DL — SIGNIFICANT CHANGE UP (ref 8.4–10.5)
CHLORIDE SERPL-SCNC: 111 MMOL/L — HIGH (ref 96–108)
CK SERPL-CCNC: 305 U/L — HIGH (ref 21–215)
CO2 SERPL-SCNC: 26 MMOL/L — SIGNIFICANT CHANGE UP (ref 22–31)
CREAT SERPL-MCNC: 1.43 MG/DL — HIGH (ref 0.5–1.3)
EOSINOPHIL # BLD AUTO: 0.29 K/UL — SIGNIFICANT CHANGE UP (ref 0–0.5)
EOSINOPHIL NFR BLD AUTO: 3.5 % — SIGNIFICANT CHANGE UP (ref 0–6)
GLUCOSE SERPL-MCNC: 109 MG/DL — HIGH (ref 70–99)
HCT VFR BLD CALC: 36.5 % — SIGNIFICANT CHANGE UP (ref 34.5–45)
HGB BLD-MCNC: 11.8 G/DL — SIGNIFICANT CHANGE UP (ref 11.5–15.5)
IMM GRANULOCYTES NFR BLD AUTO: 0.4 % — SIGNIFICANT CHANGE UP (ref 0–1.5)
INR BLD: 1.09 RATIO — SIGNIFICANT CHANGE UP (ref 0.88–1.16)
LACTATE SERPL-SCNC: 1.4 MMOL/L — SIGNIFICANT CHANGE UP (ref 0.7–2)
LYMPHOCYTES # BLD AUTO: 1.74 K/UL — SIGNIFICANT CHANGE UP (ref 1–3.3)
LYMPHOCYTES # BLD AUTO: 21 % — SIGNIFICANT CHANGE UP (ref 13–44)
MAGNESIUM SERPL-MCNC: 2.3 MG/DL — SIGNIFICANT CHANGE UP (ref 1.6–2.6)
MCHC RBC-ENTMCNC: 29.5 PG — SIGNIFICANT CHANGE UP (ref 27–34)
MCHC RBC-ENTMCNC: 32.3 GM/DL — SIGNIFICANT CHANGE UP (ref 32–36)
MCV RBC AUTO: 91.3 FL — SIGNIFICANT CHANGE UP (ref 80–100)
MONOCYTES # BLD AUTO: 0.87 K/UL — SIGNIFICANT CHANGE UP (ref 0–0.9)
MONOCYTES NFR BLD AUTO: 10.5 % — SIGNIFICANT CHANGE UP (ref 2–14)
NEUTROPHILS # BLD AUTO: 5.31 K/UL — SIGNIFICANT CHANGE UP (ref 1.8–7.4)
NEUTROPHILS NFR BLD AUTO: 63.9 % — SIGNIFICANT CHANGE UP (ref 43–77)
NRBC # BLD: 0 /100 WBCS — SIGNIFICANT CHANGE UP (ref 0–0)
NT-PROBNP SERPL-SCNC: 851 PG/ML — HIGH (ref 0–450)
PLATELET # BLD AUTO: 326 K/UL — SIGNIFICANT CHANGE UP (ref 150–400)
POTASSIUM SERPL-MCNC: 4.5 MMOL/L — SIGNIFICANT CHANGE UP (ref 3.5–5.3)
POTASSIUM SERPL-SCNC: 4.5 MMOL/L — SIGNIFICANT CHANGE UP (ref 3.5–5.3)
PROT SERPL-MCNC: 7 G/DL — SIGNIFICANT CHANGE UP (ref 6–8.3)
PROTHROM AB SERPL-ACNC: 12.9 SEC — SIGNIFICANT CHANGE UP (ref 10.6–13.6)
RBC # BLD: 4 M/UL — SIGNIFICANT CHANGE UP (ref 3.8–5.2)
RBC # FLD: 14.6 % — HIGH (ref 10.3–14.5)
SARS-COV-2 RNA SPEC QL NAA+PROBE: SIGNIFICANT CHANGE UP
SODIUM SERPL-SCNC: 144 MMOL/L — SIGNIFICANT CHANGE UP (ref 135–145)
TROPONIN I SERPL-MCNC: 0.02 NG/ML — SIGNIFICANT CHANGE UP (ref 0–0.04)
WBC # BLD: 8.3 K/UL — SIGNIFICANT CHANGE UP (ref 3.8–10.5)
WBC # FLD AUTO: 8.3 K/UL — SIGNIFICANT CHANGE UP (ref 3.8–10.5)

## 2021-03-10 PROCEDURE — 99222 1ST HOSP IP/OBS MODERATE 55: CPT

## 2021-03-10 PROCEDURE — 72125 CT NECK SPINE W/O DYE: CPT | Mod: 26

## 2021-03-10 PROCEDURE — 99285 EMERGENCY DEPT VISIT HI MDM: CPT | Mod: CS

## 2021-03-10 PROCEDURE — 71045 X-RAY EXAM CHEST 1 VIEW: CPT | Mod: 26

## 2021-03-10 PROCEDURE — 70450 CT HEAD/BRAIN W/O DYE: CPT | Mod: 26

## 2021-03-10 RX ORDER — PIPERACILLIN AND TAZOBACTAM 4; .5 G/20ML; G/20ML
3.38 INJECTION, POWDER, LYOPHILIZED, FOR SOLUTION INTRAVENOUS ONCE
Refills: 0 | Status: COMPLETED | OUTPATIENT
Start: 2021-03-10 | End: 2021-03-10

## 2021-03-10 RX ORDER — SODIUM CHLORIDE 9 MG/ML
1000 INJECTION INTRAMUSCULAR; INTRAVENOUS; SUBCUTANEOUS
Refills: 0 | Status: DISCONTINUED | OUTPATIENT
Start: 2021-03-10 | End: 2021-03-11

## 2021-03-10 RX ORDER — ACETAMINOPHEN 500 MG
650 TABLET ORAL ONCE
Refills: 0 | Status: COMPLETED | OUTPATIENT
Start: 2021-03-10 | End: 2021-03-10

## 2021-03-10 RX ADMIN — PIPERACILLIN AND TAZOBACTAM 200 GRAM(S): 4; .5 INJECTION, POWDER, LYOPHILIZED, FOR SOLUTION INTRAVENOUS at 21:37

## 2021-03-10 RX ADMIN — Medication 650 MILLIGRAM(S): at 22:07

## 2021-03-10 RX ADMIN — SODIUM CHLORIDE 150 MILLILITER(S): 9 INJECTION INTRAMUSCULAR; INTRAVENOUS; SUBCUTANEOUS at 21:37

## 2021-03-10 RX ADMIN — Medication 650 MILLIGRAM(S): at 21:37

## 2021-03-10 NOTE — ED PROVIDER NOTE - CARE PLAN
Principal Discharge DX:	Infected ulcer of skin  Secondary Diagnosis:	Frequent falls  Secondary Diagnosis:	Dehydration  Secondary Diagnosis:	DANISH (acute kidney injury)

## 2021-03-10 NOTE — H&P ADULT - SKIN
detailed exam b/l le  rednesss, warm/tenderness+ with blisters., foul smelling discharge from the ulcers

## 2021-03-10 NOTE — H&P ADULT - NSHPPHYSICALEXAM_GEN_ALL_CORE
Vital Signs (24 Hrs):  T(C): 36.3 (03-10-21 @ 20:31), Max: 36.4 (03-10-21 @ 16:59)  HR: 75 (03-10-21 @ 20:31) (60 - 75)  BP: 103/58 (03-10-21 @ 20:31) (103/58 - 112/54)  RR: 16 (03-10-21 @ 20:31) (16 - 16)  SpO2: 96% (03-10-21 @ 20:31) (93% - 96%)  Wt(kg): --  Daily Height in cm: 160.02 (10 Mar 2021 16:59)    Daily     I&O's Summary

## 2021-03-10 NOTE — H&P ADULT - ATTENDING COMMENTS
Pt seen and examined.  Case discussed with MAR.  89 year old woman with PMH of CHF, HTN, chronic (seasonal) lower extremity swelling and hx of recurrent falls here with concerns of worsening B/L LE swelling and difficulty ambulating. There was also another episode of fall this AM.     Vital Signs Last 24 Hrs  T(C): 36.3 (10 Mar 2021 20:31), Max: 36.4 (10 Mar 2021 16:59)  T(F): 97.4 (10 Mar 2021 20:31), Max: 97.5 (10 Mar 2021 16:59)  HR: 75 (10 Mar 2021 20:31) (60 - 75)  RR: 16 (10 Mar 2021 20:31) (16 - 16)  SpO2: 96% (10 Mar 2021 20:31) (93% - 96%)                          11.8   8.30  )-----------( 326      ( 10 Mar 2021 17:56 )             36.5     03-10    144  |  111<H>  |  60<H>  ----------------------------<  109<H>  4.5   |  26  |  1.43<H>    Ca    9.2      10 Mar 2021 17:56  Mg     2.3     03-10  TPro  7.0  /  Alb  2.9<L>  /  TBili  0.3  /  DBili  x   /  AST  39  /  ALT  43  /  AlkPhos  119  03-10    PT/INR - ( 10 Mar 2021 17:56 )   PT: 12.9 sec;   INR: 1.09 ratio    PTT - ( 10 Mar 2021 17:56 )  PTT:31.8 sec    CK - 305  pro BNP - 851 ( low for age)  CXR unremarkable  CTH - independent review - unremarkable    Impression  89 year old with hx as above - known to us from prior admissions for recurrent falls which appears related to her chronic LE swelling with ambulatory dysfunction. She does have mild to mod LV systolic dysfunction judging from her ECHO last done in 2019 and mild DD as well. However, there has been no clinical findings including exams, CXR and pro BNP to confirm a CHF diagnosis. Her LE swelling appear unlikely cardiac in origin but no clear cut etiology presently.    A/P  - B/L lower extremity swelling -chronic but exacerbated  - Recurrent falls and ambulatory dysfunction  - DANISH - most likely pre-renal - most likely from 3rd spacing reducing renal perfusion    Plan  Admit to Medicine  Symptomatic care with IV diuresis  Wound care consult   Consider empiric antibiotics with IV ancef  Blood and wound cultures before antibiotics  PT evaluation   Fall precaution   Resume home meds; evaluate utility of ACE inhibitor and BB ( which were started on account of concern for CHF)  Obtain ECHO in AM  Repeat chem after diuresis to evaluate renal function Pt seen and examined.  Case discussed with MAR.  89 year old woman with PMH of CHF, HTN, chronic (seasonal) lower extremity swelling and hx of recurrent falls here with concerns of worsening B/L LE swelling and difficulty ambulating. There was also another episode of fall this AM.     Vital Signs Last 24 Hrs  T(C): 36.3 (10 Mar 2021 20:31), Max: 36.4 (10 Mar 2021 16:59)  T(F): 97.4 (10 Mar 2021 20:31), Max: 97.5 (10 Mar 2021 16:59)  HR: 75 (10 Mar 2021 20:31) (60 - 75)  RR: 16 (10 Mar 2021 20:31) (16 - 16)  SpO2: 96% (10 Mar 2021 20:31) (93% - 96%)    Elderly woman. lying in bed, NAD  CTA B/L   B/L LE 2+ piting edema with erythema from chronic venous stasis  Not warm to touch and minimal tenderness to palpation  Open shallow ulcers on both legs- appears weepy but nonpurulent discharge                        11.8   8.30  )-----------( 326      ( 10 Mar 2021 17:56 )             36.5     03-10    144  |  111<H>  |  60<H>  ----------------------------<  109<H>  4.5   |  26  |  1.43<H>    Ca    9.2      10 Mar 2021 17:56  Mg     2.3     03-10  TPro  7.0  /  Alb  2.9<L>  /  TBili  0.3  /  DBili  x   /  AST  39  /  ALT  43  /  AlkPhos  119  03-10    PT/INR - ( 10 Mar 2021 17:56 )   PT: 12.9 sec;   INR: 1.09 ratio    PTT - ( 10 Mar 2021 17:56 )  PTT:31.8 sec    CK - 305  pro BNP - 851 ( low for age)  CXR unremarkable  CTH - independent review - unremarkable    Impression  89 year old with hx as above - known to us from prior admissions for recurrent falls which appears related to her chronic LE swelling with ambulatory dysfunction. She does have mild to mod LV systolic dysfunction judging from her ECHO last done in 2019 and mild DD as well. However, there has been no clinical findings including exams, CXR and pro BNP to confirm a CHF diagnosis. Her LE swelling appear unlikely cardiac in origin but no clear cut etiology presently.    A/P  - B/L lower extremity swelling -chronic but exacerbated  - Recurrent falls and ambulatory dysfunction  - DANISH - most likely pre-renal - most likely from 3rd spacing reducing renal perfusion  - Suspected B/L LE wound infection    Plan  Admit to Medicine  Symptomatic care with IV diuresis  Wound care consult   Consider empiric antibiotics with IV ancef 2g q 8 hourly  Blood and wound cultures before antibiotics  PT evaluation   Fall precaution   Resume home meds; evaluate utility of ACE inhibitor and BB ( which were started on account of concern for CHF)  Obtain ECHO in AM  Repeat chem after diuresis to evaluate renal function

## 2021-03-10 NOTE — H&P ADULT - PROBLEM SELECTOR PLAN 5
Pt has PMH of HTN   started on lasix and Metoprolol ( based on previous med rec)  Monitor vitals  DASH diet

## 2021-03-10 NOTE — ED ADULT TRIAGE NOTE - CHIEF COMPLAINT QUOTE
Bilateral leg swelling and redness for a while getting worst also pt reported with frequent falls from daughter per EMS

## 2021-03-10 NOTE — H&P ADULT - PROBLEM SELECTOR PLAN 2
Pt noted to have multiple episodes of Mechanical fall since past 2-3 months  Last fall being yesterday with head trauma   F/u CT head  f/u orthostatics, ECHO and Carotid doppler   f/u PT Pt noted to have multiple episodes of Mechanical fall since past 2-3 months  Last fall being yesterday with head trauma    CT head - no acute changes   f/u orthostatics, ECHO and Carotid doppler   f/u PT

## 2021-03-10 NOTE — H&P ADULT - PROBLEM SELECTOR PLAN 4
Pt has PMH of CHF  ( last echo Dec 2019 EF 40-45% g1dd  F/U strict i/o, daily weight   f/u ECHO  Primary team to confirm med rec from family In am  started on lasix and Metoprolol ( based on previous med rec)

## 2021-03-10 NOTE — ED ADULT NURSE NOTE - OBJECTIVE STATEMENT
bilateral lower leg swelling bilateral lower leg swelling. Multiple pen blisters noted to B/L extremities. bilateral lower leg swelling. Multiple open and oozing blisters noted to B/L extremities.

## 2021-03-10 NOTE — H&P ADULT - ASSESSMENT
89 female, from home, lives with Son and daughter in law,  with PMHx of Dementia,  HTN, CHF( last echo Dec 2019 EF 40-45% g1dd) , Anxiety,  prediabetes presents to the ED c/o BLE pain/swelling x 3 months.  Pt admitted for Chronic leg pain and swelling with Multiple falls.

## 2021-03-10 NOTE — ED PROVIDER NOTE - ENMT, MLM
Airway patent, Nasal mucosa clear. Mouth with normal mucosa. Throat has no vesicles, no oropharyngeal exudates and uvula is midline. Airway patent, Nasal mucosa clear. Mouth with sl. dry mucosa. Throat has no vesicles, no oropharyngeal exudates and uvula is midline.

## 2021-03-10 NOTE — H&P ADULT - HISTORY OF PRESENT ILLNESS
89 female, from home, lives with Son and daughter in law,  with PMHx of Dementia,  HTN, CHF( last echo Dec 2019 EF 40-45% g1dd) , Anxiety,  Chronic Venous ulcers,  prediabetes presents to the ED c/o BLE pain/swelling x 3 months. Pt with intermittent confusion for the past 3 months, AAOX2 ( Baseline). All collateral information was obtained from ED physician who  also spoke to the  son Mikhail ([phone]).  Son was not reachable the same number to me later. As per ED physician,  Son states that  Pt has been having frequent falls in the past 2-3 months, last fall  occurring this morning at 4AM, unwitnessed. Pt's son states that Pt  was found next to   her bed lying down on her back, complaining of b/l  leg pain which has been chronic for the past 2-3 months. She was also noted to have a new Ecchymosis around the L Eye but was awake on their arrival.  Pt is unable to walk well recently secondary to chronic pain. Pt has been having leg swelling for years now, being treated with 3 courses of abx for the past 2 months, last dose of Augmentin x today, been on this for the last 9 days 500mg per day. Pt noted to have heber legs with ulcers and foul-smelling discharge. Pt's appetite otherwise good. Pt denies fever, chills, headache, dizziness, CP, SOB  or any other complaints.  Denies any smoking, alcohol or any substance abuse.     In the ED,   Pt appears comfortable, no signs of any distress  Vitals- 103/58, Hr 75, Afebrile  CT head - performed, awaiting final results   Cr 1.4 ( baseline 0.84 in Dec 2019)  Trops x1 negative        OFF NOTE- MEDS RECONCILED BASED ON PREVIOUS MED REC. TO BE CONFIRMED WITH SON IN AM

## 2021-03-10 NOTE — H&P ADULT - PROBLEM SELECTOR PLAN 1
Pt admitted for chronic leg pain, swelling and weakness leading to multiple falls in past   Pt had her recent most fall yesterday suffering trauma to her head  Pt has chronic venous ulcers which appear infected with foul smelling discharge  started on iv abx   Symptoms likely due to Venous stasis and less likley to be Cardiac in origin      Blood and wound cultures before antibiotics  PT evaluation   Fall precaution   F/U echo and Venous doppler in am Pt admitted for chronic leg pain, swelling and weakness leading to multiple falls in past   Pt had her recent most fall yesterday suffering trauma to her head  Pt has chronic venous ulcers which appear infected with foul smelling discharge  started on iv abx   Symptoms likely due to Venous stasis and less likley to be Cardiac in origin      Blood and wound cultures before antibiotics  PT evaluation   Fall precaution   F/U echo and Venous doppler in am  ID- Dr. Eaton

## 2021-03-10 NOTE — H&P ADULT - NSHPLABSRESULTS_GEN_ALL_CORE
11.8   8.30  )-----------( 326      ( 10 Mar 2021 17:56 )             36.5       03-10    144  |  111<H>  |  60<H>  ----------------------------<  109<H>  4.5   |  26  |  1.43<H>    Ca    9.2      10 Mar 2021 17:56  Mg     2.3     03-10    TPro  7.0  /  Alb  2.9<L>  /  TBili  0.3  /  DBili  x   /  AST  39  /  ALT  43  /  AlkPhos  119  03-10                  PT/INR - ( 10 Mar 2021 17:56 )   PT: 12.9 sec;   INR: 1.09 ratio         PTT - ( 10 Mar 2021 17:56 )  PTT:31.8 sec    Lactate Trend  03-10 @ 17:56 Lactate:1.4       CARDIAC MARKERS ( 10 Mar 2021 17:56 )  0.021 ng/mL / x     / 305 U/L / x     / x            CAPILLARY BLOOD GLUCOSE

## 2021-03-10 NOTE — H&P ADULT - PROBLEM SELECTOR PLAN 8
IMPROVE VTE Individual Risk Assessment    RISK                                                          Points  [] Previous VTE                                           3  [] Thrombophilia                                        2  [] Lower limb paralysis                              2   [] Current Cancer                                       2   [x] Immobilization > 24 hrs                        1  [x] ICU/CCU stay > 24 hours                       1  [x] Age > 60                                                   1    IMPROVE VTE Score: 3  heparin   ppi

## 2021-03-10 NOTE — H&P ADULT - PROBLEM SELECTOR PLAN 3
Cr 1.4 ( baseline 0.84 in Dec 2019)  DANISH Likely due to dehydration   f/u bmp and urine lytes in am

## 2021-03-10 NOTE — ED PROVIDER NOTE - OBJECTIVE STATEMENT
89 y/o female with PMHx of HTN, CHF, prediabetes presents to the ED c/o BLE pain/swelling x 3 months. Pt with intermittent confusion for the past 3 months, HPI as per son Mikhail ([phone]). Pt's son states pt currently lives with son Mikhail and daughter-in-law Lawrence and since Dec 2020, pt has been having frequent falls, last fall this morning at 4AM, unwitnessed. Pt's son states pt was found next to er beds lying down on her back, complaining of heber leg pain which has been chronic for the past 2-3 months. Pt is unable to walk well recently secondary to chronic pain. Pt has been having leg swelling for years now, being treated with 3 courses of abx for the past 2 months, last dose of Augmentin x today, been on this for the last 9 days 500mg per day. Pt noted to have heber legs with ulcers and foul-smelling discharge. Pt's appetite otherwise good. Pt denies fever, chills, or any other complaints. NKDA.

## 2021-03-10 NOTE — H&P ADULT - NSICDXPASTMEDICALHX_GEN_ALL_CORE_FT
PAST MEDICAL HISTORY:  Anxiety     CHF (congestive heart failure)     HTN (hypertension)     Leg swelling     Prediabetes

## 2021-03-10 NOTE — ED PROVIDER NOTE - MUSCULOSKELETAL, MLM
Spine appears normal, range of motion is not limited, no muscle or joint tenderness Spine appears normal, range of motion is not limited, heber lower ext- venosus stasis, erythematous, warm/tenderness to touch with blisters.  No crepitus.  RLE/LLE-below knees-rt lat/ Lt med aspect-ulcer with d/c

## 2021-03-10 NOTE — ED ADULT NURSE NOTE - NSIMPLEMENTINTERV_GEN_ALL_ED
Implemented All Fall with Harm Risk Interventions:  [place] to call system. Call bell, personal items and telephone within reach. Instruct patient to call for assistance. Room bathroom lighting operational. Non-slip footwear when patient is off stretcher. Physically safe environment: no spills, clutter or unnecessary equipment. Stretcher in lowest position, wheels locked, appropriate side rails in place. Provide visual cue, wrist band, yellow gown, etc. Monitor gait and stability. Monitor for mental status changes and reorient to person, place, and time. Review medications for side effects contributing to fall risk. Reinforce activity limits and safety measures with patient and family. Provide visual clues: red socks.

## 2021-03-11 DIAGNOSIS — Z71.89 OTHER SPECIFIED COUNSELING: ICD-10-CM

## 2021-03-11 DIAGNOSIS — R73.03 PREDIABETES: ICD-10-CM

## 2021-03-11 DIAGNOSIS — I10 ESSENTIAL (PRIMARY) HYPERTENSION: ICD-10-CM

## 2021-03-11 DIAGNOSIS — N17.9 ACUTE KIDNEY FAILURE, UNSPECIFIED: ICD-10-CM

## 2021-03-11 DIAGNOSIS — R29.6 REPEATED FALLS: ICD-10-CM

## 2021-03-11 DIAGNOSIS — M79.89 OTHER SPECIFIED SOFT TISSUE DISORDERS: ICD-10-CM

## 2021-03-11 DIAGNOSIS — F41.9 ANXIETY DISORDER, UNSPECIFIED: ICD-10-CM

## 2021-03-11 DIAGNOSIS — I50.9 HEART FAILURE, UNSPECIFIED: ICD-10-CM

## 2021-03-11 DIAGNOSIS — Z29.9 ENCOUNTER FOR PROPHYLACTIC MEASURES, UNSPECIFIED: ICD-10-CM

## 2021-03-11 LAB
A1C WITH ESTIMATED AVERAGE GLUCOSE RESULT: 6.3 % — HIGH (ref 4–5.6)
ALBUMIN SERPL ELPH-MCNC: 2.6 G/DL — LOW (ref 3.5–5)
ALP SERPL-CCNC: 83 U/L — SIGNIFICANT CHANGE UP (ref 40–120)
ALT FLD-CCNC: 36 U/L DA — SIGNIFICANT CHANGE UP (ref 10–60)
ANION GAP SERPL CALC-SCNC: 6 MMOL/L — SIGNIFICANT CHANGE UP (ref 5–17)
APTT BLD: 29.3 SEC — SIGNIFICANT CHANGE UP (ref 27.5–35.5)
AST SERPL-CCNC: 32 U/L — SIGNIFICANT CHANGE UP (ref 10–40)
BASOPHILS # BLD AUTO: 0.05 K/UL — SIGNIFICANT CHANGE UP (ref 0–0.2)
BASOPHILS NFR BLD AUTO: 0.7 % — SIGNIFICANT CHANGE UP (ref 0–2)
BILIRUB SERPL-MCNC: 0.4 MG/DL — SIGNIFICANT CHANGE UP (ref 0.2–1.2)
BUN SERPL-MCNC: 50 MG/DL — HIGH (ref 7–18)
CALCIUM SERPL-MCNC: 8.8 MG/DL — SIGNIFICANT CHANGE UP (ref 8.4–10.5)
CHLORIDE SERPL-SCNC: 112 MMOL/L — HIGH (ref 96–108)
CHOLEST SERPL-MCNC: 101 MG/DL — SIGNIFICANT CHANGE UP
CO2 SERPL-SCNC: 27 MMOL/L — SIGNIFICANT CHANGE UP (ref 22–31)
CREAT SERPL-MCNC: 1.23 MG/DL — SIGNIFICANT CHANGE UP (ref 0.5–1.3)
EOSINOPHIL # BLD AUTO: 0.32 K/UL — SIGNIFICANT CHANGE UP (ref 0–0.5)
EOSINOPHIL NFR BLD AUTO: 4.7 % — SIGNIFICANT CHANGE UP (ref 0–6)
ESTIMATED AVERAGE GLUCOSE: 134 MG/DL — HIGH (ref 68–114)
FERRITIN SERPL-MCNC: 191 NG/ML — HIGH (ref 15–150)
FOLATE SERPL-MCNC: 10.1 NG/ML — SIGNIFICANT CHANGE UP
GLUCOSE BLDC GLUCOMTR-MCNC: 103 MG/DL — HIGH (ref 70–99)
GLUCOSE BLDC GLUCOMTR-MCNC: 104 MG/DL — HIGH (ref 70–99)
GLUCOSE BLDC GLUCOMTR-MCNC: 107 MG/DL — HIGH (ref 70–99)
GLUCOSE BLDC GLUCOMTR-MCNC: 115 MG/DL — HIGH (ref 70–99)
GLUCOSE SERPL-MCNC: 86 MG/DL — SIGNIFICANT CHANGE UP (ref 70–99)
HCT VFR BLD CALC: 33.1 % — LOW (ref 34.5–45)
HDLC SERPL-MCNC: 58 MG/DL — SIGNIFICANT CHANGE UP
HGB BLD-MCNC: 10.8 G/DL — LOW (ref 11.5–15.5)
IMM GRANULOCYTES NFR BLD AUTO: 0.3 % — SIGNIFICANT CHANGE UP (ref 0–1.5)
INR BLD: 1.12 RATIO — SIGNIFICANT CHANGE UP (ref 0.88–1.16)
IRON SATN MFR SERPL: 22 % — SIGNIFICANT CHANGE UP (ref 15–50)
IRON SATN MFR SERPL: 47 UG/DL — SIGNIFICANT CHANGE UP (ref 40–150)
LIPID PNL WITH DIRECT LDL SERPL: 32 MG/DL — SIGNIFICANT CHANGE UP
LYMPHOCYTES # BLD AUTO: 1.6 K/UL — SIGNIFICANT CHANGE UP (ref 1–3.3)
LYMPHOCYTES # BLD AUTO: 23.4 % — SIGNIFICANT CHANGE UP (ref 13–44)
MAGNESIUM SERPL-MCNC: 2.6 MG/DL — SIGNIFICANT CHANGE UP (ref 1.6–2.6)
MCHC RBC-ENTMCNC: 29.3 PG — SIGNIFICANT CHANGE UP (ref 27–34)
MCHC RBC-ENTMCNC: 32.6 GM/DL — SIGNIFICANT CHANGE UP (ref 32–36)
MCV RBC AUTO: 89.7 FL — SIGNIFICANT CHANGE UP (ref 80–100)
MONOCYTES # BLD AUTO: 0.95 K/UL — HIGH (ref 0–0.9)
MONOCYTES NFR BLD AUTO: 13.9 % — SIGNIFICANT CHANGE UP (ref 2–14)
NEUTROPHILS # BLD AUTO: 3.91 K/UL — SIGNIFICANT CHANGE UP (ref 1.8–7.4)
NEUTROPHILS NFR BLD AUTO: 57 % — SIGNIFICANT CHANGE UP (ref 43–77)
NON HDL CHOLESTEROL: 43 MG/DL — SIGNIFICANT CHANGE UP
NRBC # BLD: 0 /100 WBCS — SIGNIFICANT CHANGE UP (ref 0–0)
PHOSPHATE SERPL-MCNC: 3.4 MG/DL — SIGNIFICANT CHANGE UP (ref 2.5–4.5)
PLATELET # BLD AUTO: 300 K/UL — SIGNIFICANT CHANGE UP (ref 150–400)
POTASSIUM SERPL-MCNC: 4.1 MMOL/L — SIGNIFICANT CHANGE UP (ref 3.5–5.3)
POTASSIUM SERPL-SCNC: 4.1 MMOL/L — SIGNIFICANT CHANGE UP (ref 3.5–5.3)
PROT SERPL-MCNC: 6.1 G/DL — SIGNIFICANT CHANGE UP (ref 6–8.3)
PROTHROM AB SERPL-ACNC: 13.3 SEC — SIGNIFICANT CHANGE UP (ref 10.6–13.6)
RBC # BLD: 3.69 M/UL — LOW (ref 3.8–5.2)
RBC # FLD: 14.6 % — HIGH (ref 10.3–14.5)
SARS-COV-2 IGG SERPL QL IA: POSITIVE
SARS-COV-2 IGM SERPL IA-ACNC: 16.6 INDEX — HIGH
SODIUM SERPL-SCNC: 145 MMOL/L — SIGNIFICANT CHANGE UP (ref 135–145)
TIBC SERPL-MCNC: 210 UG/DL — LOW (ref 250–450)
TRIGL SERPL-MCNC: 54 MG/DL — SIGNIFICANT CHANGE UP
TROPONIN I SERPL-MCNC: 0.02 NG/ML — SIGNIFICANT CHANGE UP (ref 0–0.04)
TSH SERPL-MCNC: 0.76 UU/ML — SIGNIFICANT CHANGE UP (ref 0.34–4.82)
UIBC SERPL-MCNC: 163 UG/DL — SIGNIFICANT CHANGE UP (ref 110–370)
VIT B12 SERPL-MCNC: 487 PG/ML — SIGNIFICANT CHANGE UP (ref 232–1245)
WBC # BLD: 6.85 K/UL — SIGNIFICANT CHANGE UP (ref 3.8–10.5)
WBC # FLD AUTO: 6.85 K/UL — SIGNIFICANT CHANGE UP (ref 3.8–10.5)

## 2021-03-11 PROCEDURE — 99233 SBSQ HOSP IP/OBS HIGH 50: CPT | Mod: GC

## 2021-03-11 PROCEDURE — 93970 EXTREMITY STUDY: CPT | Mod: 26

## 2021-03-11 RX ORDER — PANTOPRAZOLE SODIUM 20 MG/1
40 TABLET, DELAYED RELEASE ORAL
Refills: 0 | Status: DISCONTINUED | OUTPATIENT
Start: 2021-03-11 | End: 2021-03-15

## 2021-03-11 RX ORDER — CEFAZOLIN SODIUM 1 G
500 VIAL (EA) INJECTION ONCE
Refills: 0 | Status: COMPLETED | OUTPATIENT
Start: 2021-03-11 | End: 2021-03-11

## 2021-03-11 RX ORDER — INSULIN LISPRO 100/ML
VIAL (ML) SUBCUTANEOUS
Refills: 0 | Status: DISCONTINUED | OUTPATIENT
Start: 2021-03-11 | End: 2021-03-15

## 2021-03-11 RX ORDER — METOPROLOL TARTRATE 50 MG
0.5 TABLET ORAL
Qty: 0 | Refills: 0 | DISCHARGE

## 2021-03-11 RX ORDER — ESCITALOPRAM OXALATE 10 MG/1
10 TABLET, FILM COATED ORAL DAILY
Refills: 0 | Status: DISCONTINUED | OUTPATIENT
Start: 2021-03-11 | End: 2021-03-15

## 2021-03-11 RX ORDER — LISINOPRIL 2.5 MG/1
40 TABLET ORAL DAILY
Refills: 0 | Status: DISCONTINUED | OUTPATIENT
Start: 2021-03-11 | End: 2021-03-11

## 2021-03-11 RX ORDER — CEFAZOLIN SODIUM 1 G
500 VIAL (EA) INJECTION EVERY 8 HOURS
Refills: 0 | Status: DISCONTINUED | OUTPATIENT
Start: 2021-03-11 | End: 2021-03-15

## 2021-03-11 RX ORDER — HEPARIN SODIUM 5000 [USP'U]/ML
5000 INJECTION INTRAVENOUS; SUBCUTANEOUS EVERY 8 HOURS
Refills: 0 | Status: DISCONTINUED | OUTPATIENT
Start: 2021-03-11 | End: 2021-03-15

## 2021-03-11 RX ORDER — METOPROLOL TARTRATE 50 MG
50 TABLET ORAL DAILY
Refills: 0 | Status: DISCONTINUED | OUTPATIENT
Start: 2021-03-11 | End: 2021-03-11

## 2021-03-11 RX ORDER — CEFAZOLIN SODIUM 1 G
VIAL (EA) INJECTION
Refills: 0 | Status: DISCONTINUED | OUTPATIENT
Start: 2021-03-11 | End: 2021-03-15

## 2021-03-11 RX ORDER — FUROSEMIDE 40 MG
40 TABLET ORAL
Refills: 0 | Status: DISCONTINUED | OUTPATIENT
Start: 2021-03-11 | End: 2021-03-11

## 2021-03-11 RX ORDER — ESCITALOPRAM OXALATE 10 MG/1
1 TABLET, FILM COATED ORAL
Qty: 0 | Refills: 0 | DISCHARGE

## 2021-03-11 RX ORDER — FUROSEMIDE 40 MG
20 TABLET ORAL
Refills: 0 | Status: DISCONTINUED | OUTPATIENT
Start: 2021-03-11 | End: 2021-03-15

## 2021-03-11 RX ORDER — METOPROLOL TARTRATE 50 MG
12.5 TABLET ORAL DAILY
Refills: 0 | Status: DISCONTINUED | OUTPATIENT
Start: 2021-03-11 | End: 2021-03-15

## 2021-03-11 RX ORDER — SENNA PLUS 8.6 MG/1
2 TABLET ORAL AT BEDTIME
Refills: 0 | Status: DISCONTINUED | OUTPATIENT
Start: 2021-03-11 | End: 2021-03-15

## 2021-03-11 RX ORDER — METOPROLOL TARTRATE 50 MG
12.5 TABLET ORAL DAILY
Refills: 0 | Status: DISCONTINUED | OUTPATIENT
Start: 2021-03-11 | End: 2021-03-11

## 2021-03-11 RX ORDER — ASPIRIN/CALCIUM CARB/MAGNESIUM 324 MG
81 TABLET ORAL DAILY
Refills: 0 | Status: DISCONTINUED | OUTPATIENT
Start: 2021-03-11 | End: 2021-03-15

## 2021-03-11 RX ORDER — CHOLECALCIFEROL (VITAMIN D3) 125 MCG
1000 CAPSULE ORAL DAILY
Refills: 0 | Status: DISCONTINUED | OUTPATIENT
Start: 2021-03-11 | End: 2021-03-15

## 2021-03-11 RX ORDER — ATORVASTATIN CALCIUM 80 MG/1
40 TABLET, FILM COATED ORAL AT BEDTIME
Refills: 0 | Status: DISCONTINUED | OUTPATIENT
Start: 2021-03-11 | End: 2021-03-15

## 2021-03-11 RX ORDER — FUROSEMIDE 40 MG
60 TABLET ORAL DAILY
Refills: 0 | Status: DISCONTINUED | OUTPATIENT
Start: 2021-03-11 | End: 2021-03-11

## 2021-03-11 RX ORDER — ACETAMINOPHEN 500 MG
650 TABLET ORAL EVERY 6 HOURS
Refills: 0 | Status: DISCONTINUED | OUTPATIENT
Start: 2021-03-11 | End: 2021-03-15

## 2021-03-11 RX ADMIN — Medication 1000 UNIT(S): at 11:30

## 2021-03-11 RX ADMIN — Medication 100 MILLIGRAM(S): at 05:42

## 2021-03-11 RX ADMIN — Medication 100 MILLIGRAM(S): at 21:27

## 2021-03-11 RX ADMIN — PANTOPRAZOLE SODIUM 40 MILLIGRAM(S): 20 TABLET, DELAYED RELEASE ORAL at 05:44

## 2021-03-11 RX ADMIN — ATORVASTATIN CALCIUM 40 MILLIGRAM(S): 80 TABLET, FILM COATED ORAL at 21:27

## 2021-03-11 RX ADMIN — HEPARIN SODIUM 5000 UNIT(S): 5000 INJECTION INTRAVENOUS; SUBCUTANEOUS at 21:27

## 2021-03-11 RX ADMIN — Medication 20 MILLIGRAM(S): at 17:22

## 2021-03-11 RX ADMIN — Medication 100 MILLIGRAM(S): at 06:27

## 2021-03-11 RX ADMIN — SENNA PLUS 2 TABLET(S): 8.6 TABLET ORAL at 21:27

## 2021-03-11 RX ADMIN — Medication 81 MILLIGRAM(S): at 11:30

## 2021-03-11 RX ADMIN — ESCITALOPRAM OXALATE 10 MILLIGRAM(S): 10 TABLET, FILM COATED ORAL at 11:30

## 2021-03-11 RX ADMIN — HEPARIN SODIUM 5000 UNIT(S): 5000 INJECTION INTRAVENOUS; SUBCUTANEOUS at 13:11

## 2021-03-11 RX ADMIN — HEPARIN SODIUM 5000 UNIT(S): 5000 INJECTION INTRAVENOUS; SUBCUTANEOUS at 05:43

## 2021-03-11 RX ADMIN — Medication 100 MILLIGRAM(S): at 13:11

## 2021-03-11 NOTE — PROGRESS NOTE ADULT - PROBLEM SELECTOR PLAN 7
Pt has PMH of anxiety   Pt on lexapro as per previous med rec  started on lexapro Pt has PMH of anxiety   Pt on lexapro at home  started on lexapro

## 2021-03-11 NOTE — CONSULT NOTE ADULT - ASSESSMENT
Bilateral leg cellulitis  Bilateral leg venous ulcers    Plan: Continue Ancef 500 mg iv q8 Bilateral leg cellulitis  Bilateral leg venous stasis ulcers    Plan: Continue Ancef 500 mg iv q8

## 2021-03-11 NOTE — PROGRESS NOTE ADULT - SUBJECTIVE AND OBJECTIVE BOX
PGY-1 Progress Note discussed with attending    PAGER #: [[phone]] TILL 5:00 PM  PLEASE CONTACT ON CALL TEAM:  - On Call Team (Please refer to Emma) FROM 5:00 PM - 8:30PM  - Nightfloat Team FROM 8:30 -7:30 AM    CHIEF COMPLAINT & BRIEF HOSPITAL COURSE:    INTERVAL HPI/OVERNIGHT EVENTS:       MEDICATIONS  (STANDING):  aspirin  chewable 81 milliGRAM(s) Oral daily  atorvastatin 40 milliGRAM(s) Oral at bedtime  ceFAZolin   IVPB 500 milliGRAM(s) IV Intermittent every 8 hours  ceFAZolin   IVPB      cholecalciferol 1000 Unit(s) Oral daily  escitalopram 10 milliGRAM(s) Oral daily  furosemide   Injectable 20 milliGRAM(s) IV Push two times a day  heparin   Injectable 5000 Unit(s) SubCutaneous every 8 hours  insulin lispro (ADMELOG) corrective regimen sliding scale   SubCutaneous three times a day before meals  metoprolol succinate ER 12.5 milliGRAM(s) Oral daily  pantoprazole    Tablet 40 milliGRAM(s) Oral before breakfast  senna 2 Tablet(s) Oral at bedtime    MEDICATIONS  (PRN):  acetaminophen   Tablet .. 650 milliGRAM(s) Oral every 6 hours PRN Temp greater or equal to 38C (100.4F), Mild Pain (1 - 3)      REVIEW OF SYSTEMS:  CONSTITUTIONAL: No fever, weight loss, or fatigue  RESPIRATORY: No cough, wheezing, chills or hemoptysis; No shortness of breath  CARDIOVASCULAR: No chest pain, palpitations, dizziness, or leg swelling  GASTROINTESTINAL: No abdominal pain. No nausea, vomiting, or hematemesis; No diarrhea or constipation. No melena or hematochezia.  GENITOURINARY: No dysuria or hematuria, urinary frequency  NEUROLOGICAL: No headaches, memory loss, loss of strength, numbness, or tremors  SKIN: No itching, burning, rashes, or lesions     Vital Signs Last 24 Hrs  T(C): 36.8 (11 Mar 2021 13:19), Max: 36.8 (11 Mar 2021 13:19)  T(F): 98.2 (11 Mar 2021 13:19), Max: 98.2 (11 Mar 2021 13:19)  HR: 65 (11 Mar 2021 13:19) (60 - 80)  BP: 95/43 (11 Mar 2021 13:19) (95/43 - 112/54)  BP(mean): --  RR: 18 (11 Mar 2021 13:19) (16 - 18)  SpO2: 97% (11 Mar 2021 13:19) (93% - 98%)    PHYSICAL EXAMINATION:  GENERAL: NAD, well built  HEAD:  Atraumatic, Normocephalic  EYES:  conjunctiva and sclera clear  NECK: Supple, No JVD, Normal thyroid  CHEST/LUNG: Clear to auscultation. Clear to percussion bilaterally; No rales, rhonchi, wheezing, or rubs  HEART: Regular rate and rhythm; No murmurs, rubs, or gallops  ABDOMEN: Soft, Nontender, Nondistended; Bowel sounds present  NERVOUS SYSTEM:  Alert & Oriented X3,    EXTREMITIES:  2+ Peripheral Pulses, No clubbing, cyanosis, or edema  SKIN: warm dry                          10.8   6.85  )-----------( 300      ( 11 Mar 2021 07:15 )             33.1     03-11    145  |  112<H>  |  50<H>  ----------------------------<  86  4.1   |  27  |  1.23    Ca    8.8      11 Mar 2021 07:15  Phos  3.4     03-11  Mg     2.6     03-11    TPro  6.1  /  Alb  2.6<L>  /  TBili  0.4  /  DBili  x   /  AST  32  /  ALT  36  /  AlkPhos  83  03-11    LIVER FUNCTIONS - ( 11 Mar 2021 07:15 )  Alb: 2.6 g/dL / Pro: 6.1 g/dL / ALK PHOS: 83 U/L / ALT: 36 U/L DA / AST: 32 U/L / GGT: x           CARDIAC MARKERS ( 11 Mar 2021 07:15 )  0.024 ng/mL / x     / x     / x     / x      CARDIAC MARKERS ( 10 Mar 2021 17:56 )  0.021 ng/mL / x     / 305 U/L / x     / x          PT/INR - ( 11 Mar 2021 07:15 )   PT: 13.3 sec;   INR: 1.12 ratio         PTT - ( 11 Mar 2021 07:15 )  PTT:29.3 sec    CAPILLARY BLOOD GLUCOSE      POCT Blood Glucose.: 115 mg/dL (11 Mar 2021 11:30)          RADIOLOGY & ADDITIONAL TESTS:                   PGY-1 Progress Note discussed with attending    PAGER #: [[phone]] TILL 5:00 PM  PLEASE CONTACT ON CALL TEAM:  - On Call Team (Please refer to Emma) FROM 5:00 PM - 8:30PM  - Nightfloat Team FROM 8:30 -7:30 AM    CHIEF COMPLAINT & BRIEF HOSPITAL COURSE:  89 female, from home, lives with Son and daughter in law,  with PMHx of Dementia,  HTN, CHF( last echo Dec 2019 EF 40-45% g1dd) , Anxiety,  Chronic Venous ulcers,  prediabetes presents to the ED c/o BLE pain/swelling x 3 months. Pt with intermittent confusion for the past 3 months, AAOX2 ( Baseline). All collateral information was obtained from ED physician who  also spoke to the  son Mikhail ([phone]).  Son was not reachable the same number to me later. As per ED physician,  Son states that  Pt has been having frequent falls in the past 2-3 months, last fall  occurring this morning at 4AM, unwitnessed. Pt's son states that Pt  was found next to   her bed lying down on her back, complaining of b/l  leg pain which has been chronic for the past 2-3 months. She was also noted to have a new Ecchymosis around the L Eye but was awake on their arrival.  Pt is unable to walk well recently secondary to chronic pain. Pt has been having leg swelling for years now, being treated with 3 courses of abx for the past 2 months, last dose of Augmentin x today, been on this for the last 9 days 500mg per day. Pt noted to have heber legs with ulcers and foul-smelling discharge. Pt's appetite otherwise good. Pt denies fever, chills, headache, dizziness, CP, SOB  or any other complaints.  Denies any smoking, alcohol or any substance abuse.     INTERVAL HPI/OVERNIGHT EVENTS:       MEDICATIONS  (STANDING):  aspirin  chewable 81 milliGRAM(s) Oral daily  atorvastatin 40 milliGRAM(s) Oral at bedtime  ceFAZolin   IVPB 500 milliGRAM(s) IV Intermittent every 8 hours  ceFAZolin   IVPB      cholecalciferol 1000 Unit(s) Oral daily  escitalopram 10 milliGRAM(s) Oral daily  furosemide   Injectable 20 milliGRAM(s) IV Push two times a day  heparin   Injectable 5000 Unit(s) SubCutaneous every 8 hours  insulin lispro (ADMELOG) corrective regimen sliding scale   SubCutaneous three times a day before meals  metoprolol succinate ER 12.5 milliGRAM(s) Oral daily  pantoprazole    Tablet 40 milliGRAM(s) Oral before breakfast  senna 2 Tablet(s) Oral at bedtime    MEDICATIONS  (PRN):  acetaminophen   Tablet .. 650 milliGRAM(s) Oral every 6 hours PRN Temp greater or equal to 38C (100.4F), Mild Pain (1 - 3)      REVIEW OF SYSTEMS:  CONSTITUTIONAL: No fever, weight loss, or fatigue  RESPIRATORY: No cough, wheezing, chills or hemoptysis; No shortness of breath  CARDIOVASCULAR: No chest pain, palpitations, dizziness, or leg swelling  GASTROINTESTINAL: No abdominal pain. No nausea, vomiting, or hematemesis; No diarrhea or constipation. No melena or hematochezia.  GENITOURINARY: No dysuria or hematuria, urinary frequency  NEUROLOGICAL: No headaches, memory loss, loss of strength, numbness, or tremors  SKIN: No itching, burning, rashes, or lesions     Vital Signs Last 24 Hrs  T(C): 36.8 (11 Mar 2021 13:19), Max: 36.8 (11 Mar 2021 13:19)  T(F): 98.2 (11 Mar 2021 13:19), Max: 98.2 (11 Mar 2021 13:19)  HR: 65 (11 Mar 2021 13:19) (60 - 80)  BP: 95/43 (11 Mar 2021 13:19) (95/43 - 112/54)  BP(mean): --  RR: 18 (11 Mar 2021 13:19) (16 - 18)  SpO2: 97% (11 Mar 2021 13:19) (93% - 98%)    PHYSICAL EXAMINATION:  GENERAL: NAD, well built  HEAD:  Atraumatic, Normocephalic  EYES:  conjunctiva and sclera clear  NECK: Supple, No JVD, Normal thyroid  CHEST/LUNG: Clear to auscultation. Clear to percussion bilaterally; No rales, rhonchi, wheezing, or rubs  HEART: Regular rate and rhythm; No murmurs, rubs, or gallops  ABDOMEN: Soft, Nontender, Nondistended; Bowel sounds present  NERVOUS SYSTEM:  Alert & Oriented X3,    EXTREMITIES:  2+ Peripheral Pulses, No clubbing, cyanosis, or edema  SKIN: warm dry                          10.8   6.85  )-----------( 300      ( 11 Mar 2021 07:15 )             33.1     03-11    145  |  112<H>  |  50<H>  ----------------------------<  86  4.1   |  27  |  1.23    Ca    8.8      11 Mar 2021 07:15  Phos  3.4     03-11  Mg     2.6     03-11    TPro  6.1  /  Alb  2.6<L>  /  TBili  0.4  /  DBili  x   /  AST  32  /  ALT  36  /  AlkPhos  83  03-11    LIVER FUNCTIONS - ( 11 Mar 2021 07:15 )  Alb: 2.6 g/dL / Pro: 6.1 g/dL / ALK PHOS: 83 U/L / ALT: 36 U/L DA / AST: 32 U/L / GGT: x           CARDIAC MARKERS ( 11 Mar 2021 07:15 )  0.024 ng/mL / x     / x     / x     / x      CARDIAC MARKERS ( 10 Mar 2021 17:56 )  0.021 ng/mL / x     / 305 U/L / x     / x          PT/INR - ( 11 Mar 2021 07:15 )   PT: 13.3 sec;   INR: 1.12 ratio         PTT - ( 11 Mar 2021 07:15 )  PTT:29.3 sec    CAPILLARY BLOOD GLUCOSE      POCT Blood Glucose.: 115 mg/dL (11 Mar 2021 11:30)          RADIOLOGY & ADDITIONAL TESTS:                   PGY-1 Progress Note discussed with attending    PAGER #: [[phone]] TILL 5:00 PM  PLEASE CONTACT ON CALL TEAM:  - On Call Team (Please refer to Emma) FROM 5:00 PM - 8:30PM  - Nightfloat Team FROM 8:30 -7:30 AM    CHIEF COMPLAINT & BRIEF HOSPITAL COURSE:  89 female, from home, lives with Son and daughter in law,  with PMHx of Dementia,  HTN, CHF( last echo Dec 2019 EF 40-45% g1dd) , Anxiety,  Chronic Venous ulcers,  prediabetes presents to the ED c/o BLE pain/swelling x 3 months. Pt with intermittent confusion for the past 3 months, AAOX2 ( Baseline). All collateral information was obtained from ED physician who  also spoke to the  son Mikhail ([phone]).  Son was not reachable the same number to me later. As per ED physician,  Son states that  Pt has been having frequent falls in the past 2-3 months, last fall  occurring this morning at 4AM, unwitnessed. Pt's son states that Pt  was found next to   her bed lying down on her back, complaining of b/l  leg pain which has been chronic for the past 2-3 months. She was also noted to have a new Ecchymosis around the L Eye but was awake on their arrival.  Pt is unable to walk well recently secondary to chronic pain. Pt has been having leg swelling for years now, being treated with 3 courses of abx for the past 2 months, last dose of Augmentin x today, been on this for the last 9 days 500mg per day. Pt noted to have heber legs with ulcers and foul-smelling discharge. Pt's appetite otherwise good. Pt denies fever, chills, headache, dizziness, CP, SOB  or any other complaints.  Denies any smoking, alcohol or any substance abuse.     INTERVAL HPI/OVERNIGHT EVENTS:   VD negative, Podiatry evaluated pt and placed dressing. Pt in no acute distress this am.   Called sonMikhail, multiple times on [phone] and [phone] but no response.       MEDICATIONS  (STANDING):  aspirin  chewable 81 milliGRAM(s) Oral daily  atorvastatin 40 milliGRAM(s) Oral at bedtime  ceFAZolin   IVPB 500 milliGRAM(s) IV Intermittent every 8 hours  ceFAZolin   IVPB      cholecalciferol 1000 Unit(s) Oral daily  escitalopram 10 milliGRAM(s) Oral daily  furosemide   Injectable 20 milliGRAM(s) IV Push two times a day  heparin   Injectable 5000 Unit(s) SubCutaneous every 8 hours  insulin lispro (ADMELOG) corrective regimen sliding scale   SubCutaneous three times a day before meals  metoprolol succinate ER 12.5 milliGRAM(s) Oral daily  pantoprazole    Tablet 40 milliGRAM(s) Oral before breakfast  senna 2 Tablet(s) Oral at bedtime    MEDICATIONS  (PRN):  acetaminophen   Tablet .. 650 milliGRAM(s) Oral every 6 hours PRN Temp greater or equal to 38C (100.4F), Mild Pain (1 - 3)      REVIEW OF SYSTEMS:  CONSTITUTIONAL: No fever, weight loss, or fatigue  RESPIRATORY: No cough, wheezing, chills or hemoptysis; No shortness of breath  CARDIOVASCULAR: No chest pain, palpitations, dizziness, or leg swelling  GASTROINTESTINAL: No abdominal pain. No nausea, vomiting, or hematemesis; No diarrhea or constipation. No melena or hematochezia.  GENITOURINARY: No dysuria or hematuria, urinary frequency  NEUROLOGICAL: No headaches, memory loss, loss of strength, numbness, or tremors  SKIN: B/L lower extremity wounds with serosanguineous discharge from the right one.      Vital Signs Last 24 Hrs  T(C): 36.8 (11 Mar 2021 13:19), Max: 36.8 (11 Mar 2021 13:19)  T(F): 98.2 (11 Mar 2021 13:19), Max: 98.2 (11 Mar 2021 13:19)  HR: 65 (11 Mar 2021 13:19) (60 - 80)  BP: 95/43 (11 Mar 2021 13:19) (95/43 - 112/54)  BP(mean): --  RR: 18 (11 Mar 2021 13:19) (16 - 18)  SpO2: 97% (11 Mar 2021 13:19) (93% - 98%)    PHYSICAL EXAMINATION:  GENERAL: NAD, well built  HEAD:  Atraumatic, Normocephalic  EYES:  conjunctiva and sclera clear  NECK: Supple, No JVD, Normal thyroid  CHEST/LUNG: Clear to auscultation. Clear to percussion bilaterally; No rales, rhonchi, wheezing, or rubs  HEART: Regular rate and rhythm; No murmurs, rubs, or gallops  ABDOMEN: Soft, Nontender, Nondistended; Bowel sounds present  NERVOUS SYSTEM:  Alert & Oriented X3,    EXTREMITIES:  2+ Peripheral Pulses, No clubbing, cyanosis, or edema  SKIN: warm dry                          10.8   6.85  )-----------( 300      ( 11 Mar 2021 07:15 )             33.1     03-11    145  |  112<H>  |  50<H>  ----------------------------<  86  4.1   |  27  |  1.23    Ca    8.8      11 Mar 2021 07:15  Phos  3.4     03-11  Mg     2.6     03-11    TPro  6.1  /  Alb  2.6<L>  /  TBili  0.4  /  DBili  x   /  AST  32  /  ALT  36  /  AlkPhos  83  03-11    LIVER FUNCTIONS - ( 11 Mar 2021 07:15 )  Alb: 2.6 g/dL / Pro: 6.1 g/dL / ALK PHOS: 83 U/L / ALT: 36 U/L DA / AST: 32 U/L / GGT: x           CARDIAC MARKERS ( 11 Mar 2021 07:15 )  0.024 ng/mL / x     / x     / x     / x      CARDIAC MARKERS ( 10 Mar 2021 17:56 )  0.021 ng/mL / x     / 305 U/L / x     / x          PT/INR - ( 11 Mar 2021 07:15 )   PT: 13.3 sec;   INR: 1.12 ratio         PTT - ( 11 Mar 2021 07:15 )  PTT:29.3 sec    CAPILLARY BLOOD GLUCOSE      POCT Blood Glucose.: 115 mg/dL (11 Mar 2021 11:30)          RADIOLOGY & ADDITIONAL TESTS:

## 2021-03-11 NOTE — CONSULT NOTE ADULT - ASSESSMENT
A:  venous stasis ulcer b/l  cellulitis b/l L > R     P:   Patient evaluated and Chart reviewed   Discussed diagnosis and treatment with patient  Applied betadine soaked adaptic, 4x4 gauze, ABD pad, sp, ACE dressing  Continue with IV antibiotics As Per ID  Recommend Offloading to bilateral Heels using CAIR boots    Recommend LE elevation   Podiatry to follow while in house.  Discussed with Attending Dr. Hays  Seen and evaluated bedside with attending Dr. David A:  venous stasis ulcer b/l  cellulitis b/l L > R     P:   Patient evaluated and Chart reviewed   Discussed diagnosis and treatment with patient  Applied betadine soaked adaptic, 4x4 gauze, ABD pad, sp, ACE dressing  Continue with IV antibiotics As Per ID  Recommend Offloading to bilateral Heels using CAIR boots    Recommend LE elevation   Podiatry -Dr. Hays to follow while in house.  Discussed with Attending Dr. Hays  Seen and evaluated bedside with attending Dr. David

## 2021-03-11 NOTE — CONSULT NOTE ADULT - SUBJECTIVE AND OBJECTIVE BOX
HPI:  89 female, from home, lives with Son and daughter in law,  with PMHx of Dementia,  HTN, CHF( last echo Dec 2019 EF 40-45% g1dd) , Anxiety,  Chronic Venous ulcers,  prediabetes presents to the ED c/o BLE pain/swelling x 3 months. Pt with intermittent confusion for the past 3 months, AAOX2 ( Baseline). All collateral information was obtained from ED physician who  also spoke to the  son Mikhail ([phone]).  Son was not reachable the same number to me later. As per ED physician,  Son states that  Pt has been having frequent falls in the past 2-3 months, last fall  occurring this morning at 4AM, unwitnessed. Pt's son states that Pt  was found next to   her bed lying down on her back, complaining of b/l  leg pain which has been chronic for the past 2-3 months. She was also noted to have a new Ecchymosis around the L Eye but was awake on their arrival.  Pt is unable to walk well recently secondary to chronic pain. Pt has been having leg swelling for years now, being treated with 3 courses of abx for the past 2 months, last dose of Augmentin x today, been on this for the last 9 days 500mg per day. Pt noted to have heber legs with ulcers and foul-smelling discharge. Pt's appetite otherwise good. Pt denies fever, chills, headache, dizziness, CP, SOB  or any other complaints.  Denies any smoking, alcohol or any substance abuse.     In the ED,   Pt appears comfortable, no signs of any distress  Vitals- 103/58, Hr 75, Afebrile  CT head - performed, awaiting final results   Cr 1.4 ( baseline 0.84 in Dec 2019)  Trops x1 negative        OFF NOTE- MEDS RECONCILED BASED ON PREVIOUS MED REC. TO BE CONFIRMED WITH SON IN AM   (10 Mar 2021 22:46)    REVIEW OF SYSTEMS:  [  ] Not able to illicit  General:	  Chest:	  GI:	  :  Skin:	  Musculoskeletal:	  Neuro:    PAST MEDICAL & SURGICAL HISTORY:  Prediabetes    Anxiety    HTN (hypertension)    CHF (congestive heart failure)    Leg swelling    No significant past surgical history      ALLERGIES: No Known Allergies    MEDS:  acetaminophen   Tablet .. 650 milliGRAM(s) Oral every 6 hours PRN  aspirin  chewable 81 milliGRAM(s) Oral daily  atorvastatin 40 milliGRAM(s) Oral at bedtime  ceFAZolin   IVPB 500 milliGRAM(s) IV Intermittent every 8 hours  ceFAZolin   IVPB      cholecalciferol 1000 Unit(s) Oral daily  escitalopram 10 milliGRAM(s) Oral daily  furosemide   Injectable 20 milliGRAM(s) IV Push two times a day  heparin   Injectable 5000 Unit(s) SubCutaneous every 8 hours  insulin lispro (ADMELOG) corrective regimen sliding scale   SubCutaneous three times a day before meals  metoprolol succinate ER 50 milliGRAM(s) Oral daily  pantoprazole    Tablet 40 milliGRAM(s) Oral before breakfast  senna 2 Tablet(s) Oral at bedtime    SOCIAL HISTORY:  Smoker:      FAMILY HISTORY:    VITALS:  Vital Signs Last 24 Hrs  T(C): 36.8 (11 Mar 2021 13:19), Max: 36.8 (11 Mar 2021 13:19)  T(F): 98.2 (11 Mar 2021 13:19), Max: 98.2 (11 Mar 2021 13:19)  HR: 65 (11 Mar 2021 13:19) (60 - 80)  BP: 95/43 (11 Mar 2021 13:19) (95/43 - 112/54)  BP(mean): --  RR: 18 (11 Mar 2021 13:19) (16 - 18)  SpO2: 97% (11 Mar 2021 13:19) (93% - 98%)      PHYSICAL EXAM:  Constitutional:  HEENT:  Neck:  Respiratory:  Cardiovascular:  Gastrointestinal:  Extremities:  Skin:  Ortho:  Neuro:      LABS/DIAGNOSTIC TESTS:                        10.8   6.85  )-----------( 300      ( 11 Mar 2021 07:15 )             33.1     WBC Count: 6.85 K/uL (03-11 @ 07:15)  WBC Count: 8.30 K/uL (03-10 @ 17:56)    03-11    145  |  112<H>  |  50<H>  ----------------------------<  86  4.1   |  27  |  1.23    Ca    8.8      11 Mar 2021 07:15  Phos  3.4     03-11  Mg     2.6     03-11    TPro  6.1  /  Alb  2.6<L>  /  TBili  0.4  /  DBili  x   /  AST  32  /  ALT  36  /  AlkPhos  83  03-11      LIVER FUNCTIONS - ( 11 Mar 2021 07:15 )  Alb: 2.6 g/dL / Pro: 6.1 g/dL / ALK PHOS: 83 U/L / ALT: 36 U/L DA / AST: 32 U/L / GGT: x           PT/INR - ( 11 Mar 2021 07:15 )   PT: 13.3 sec;   INR: 1.12 ratio         PTT - ( 11 Mar 2021 07:15 )  PTT:29.3 sec  Lactate, Blood: 1.4 mmol/L (03-10 @ 17:56)    ABG -     CULTURES:       RADIOLOGY:  < from: US Duplex Venous Lower Ext Complete, Bilateral (03.11.21 @ 12:38) >    EXAM:  US DPLX LWR EXT VEINS COMPL BI                            PROCEDURE DATE:  03/11/2021          INTERPRETATION:  CLINICAL STATEMENT: Swelling leg.    TECHNIQUE: Ultrasound of bilateral lower extremity deep venous system.    COMPARISON: 12/11/2018    FINDINGS:  There is color and spectral flow, compression and augmentation of the common femoral, superficial femoral and popliteal veins.    There is flow in the posterior tibial vein.    IMPRESSION:  No evidence of DVT.    -----------------------------------------------------------------------------------------------------------------------------------------------------------------------------------------------------------------------------------    < from: CT Cervical Spine No Cont (03.10.21 @ 22:30) >    EXAM:  CT CERVICAL SPINE                          EXAM:  CT BRAIN                            PROCEDURE DATE:  03/10/2021          INTERPRETATION:  CLINICAL INFORMATION:  multiple falls    TECHNIQUE:  1.  Axial CT images were acquired through the head.  2.  Axial CT images were acquired through the cervical spine.  Intravenous contrast: None  Two-dimensional reformats were generated.    COMPARISON STUDY: CT head 12/23/2019, chest x-ray 12/11/2018.    FINDINGS:  CT HEAD:    There is no CT evidence of acute intracranial hemorrhage,  mass effect, midline shift, or acute, large territorial infarct.  The ventricles and sulci are prominent compatible with moderate to advanced cerebral volume loss. The basal cisterns are patent.    Patchy periventricular and subcortical white matter hypodensities are nonspecific, although likely on the basis of chronic microangiopathy.    There are atherosclerotic vascular calcifications at the skull base.    The mastoid air cells and middle ear cavities are grossly clear. There is mucosal thickening in bilateral ethmoid air cells and small ethmoid air cell osteomas. Small mucous retention cysts or polyps are seen in the bilateral maxillary sinuses.    The calvarium and skull base are intact.    CT CERVICALSPINE:    There is  preservation  of the cervical lordosis.  There is no evidence of an acute cervical spine fracture or traumatic malalignment.  Indeterminate 2 cm sclerotic lesion in the right aspect of the T3 vertebral body, which appears to been present dating back to chest x-ray from 12/11/2018.  The paraspinous soft tissues are unremarkable within limits of CT scan.    Degenerative changes:  Moderate multilevel degenerative change, with disc space narrowing, disc osteophyte complexes, as well as bilateral uncovertebral and facet hypertrophy contributing to varying degrees of bilateral foraminal stenosis. Minimal grade 1 anterolisthesis of C7 on T1.    Incidental findings:  The thyroid gland is heterogeneous in attenuation  Coarse calcification is seen in the right lobe of the thyroid.  There are atherosclerotic bilateral carotid calcifications.  Subtle nonspecific groundglass opacities are seen in the visualized lung apices.    IMPRESSION:    CT HEAD: No acute intracranial hemorrhage, mass effect, or osseous fracture.    CT CERVICAL SPINE: No acute cervical spine fracture or traumatic malalignment. Moderate multilevel spondylosis. Indeterminate 2 cm sclerotic lesion in the right aspect of the T3 vertebral body, which appears to been present dating back to chest x-ray from 2018.          < end of copied text >   HPI:  89 female, from home, lives with Son and daughter in law,  with PMHx of Dementia,  HTN, CHF( last echo Dec 2019 EF 40-45% g1dd) , Anxiety,  Chronic Venous ulcers,  prediabetes presents to the ED c/o BLE pain/swelling x 3 months. Pt with intermittent confusion for the past 3 months, AAOX2 ( Baseline). All collateral information was obtained from ED physician who  also spoke to the  son Mikhail ([phone]).  Son was not reachable the same number to me later. As per ED physician,  Son states that  Pt has been having frequent falls in the past 2-3 months, last fall  occurring this morning at 4AM, unwitnessed. Pt's son states that Pt  was found next to   her bed lying down on her back, complaining of b/l  leg pain which has been chronic for the past 2-3 months. She was also noted to have a new Ecchymosis around the L Eye but was awake on their arrival.  Pt is unable to walk well recently secondary to chronic pain. Pt has been having leg swelling for years now, being treated with 3 courses of abx for the past 2 months, last dose of Augmentin x today, been on this for the last 9 days 500mg per day. Pt noted to have heber legs with ulcers and foul-smelling discharge. Pt's appetite otherwise good. Pt denies fever, chills, headache, dizziness, CP, SOB  or any other complaints.  Denies any smoking, alcohol or any substance abuse.     In the ED,   Pt appears comfortable, no signs of any distress  Vitals- 103/58, Hr 75, Afebrile  CT head - performed, awaiting final results   Cr 1.4 ( baseline 0.84 in Dec 2019)  Trops x1 negative        OFF NOTE- MEDS RECONCILED BASED ON PREVIOUS MED REC. TO BE CONFIRMED WITH SON IN AM   (10 Mar 2021 22:46)    History as above, patient admitted from home for worsening bilateral leg edema and pain.  Patient has been having frequent falls at home for the past couple of months.  Patient denies any fever or chills at home but does complain of bilateral leg tenderness, right > left.      REVIEW OF SYSTEMS:  [  ] Not able to illicit  General: no fevers no malaise   Chest: no cough no sob no CP  GI: no nvd no abdominal pain  : no urinary symptoms   Skin: bilateral leg rash  Musculoskeletal: no trauma no LBP  Neuro: no ha's no dizziness     PAST MEDICAL & SURGICAL HISTORY:  Prediabetes    Anxiety    HTN (hypertension)    CHF (congestive heart failure)    Leg swelling    No significant past surgical history      ALLERGIES: No Known Allergies    MEDS:  acetaminophen   Tablet .. 650 milliGRAM(s) Oral every 6 hours PRN  aspirin  chewable 81 milliGRAM(s) Oral daily  atorvastatin 40 milliGRAM(s) Oral at bedtime  ceFAZolin   IVPB 500 milliGRAM(s) IV Intermittent every 8 hours  ceFAZolin   IVPB      cholecalciferol 1000 Unit(s) Oral daily  escitalopram 10 milliGRAM(s) Oral daily  furosemide   Injectable 20 milliGRAM(s) IV Push two times a day  heparin   Injectable 5000 Unit(s) SubCutaneous every 8 hours  insulin lispro (ADMELOG) corrective regimen sliding scale   SubCutaneous three times a day before meals  metoprolol succinate ER 50 milliGRAM(s) Oral daily  pantoprazole    Tablet 40 milliGRAM(s) Oral before breakfast  senna 2 Tablet(s) Oral at bedtime    SOCIAL HISTORY:  Smoker:  Denies  Alcohol: Denies    FAMILY HISTORY:    VITALS:  Vital Signs Last 24 Hrs  T(C): 36.8 (11 Mar 2021 13:19), Max: 36.8 (11 Mar 2021 13:19)  T(F): 98.2 (11 Mar 2021 13:19), Max: 98.2 (11 Mar 2021 13:19)  HR: 65 (11 Mar 2021 13:19) (60 - 80)  BP: 95/43 (11 Mar 2021 13:19) (95/43 - 112/54)  BP(mean): --  RR: 18 (11 Mar 2021 13:19) (16 - 18)  SpO2: 97% (11 Mar 2021 13:19) (93% - 98%)      PHYSICAL EXAM:  HEENT: normocephalic with moist oral mucosa, Left periorbital ecchymosis (from fall)  Neck: supple no LN's no JVD  Respiratory: lungs clear no rales no rhonchi  Cardiovascular: S1 S2 reg no murmurs  Gastrointestinal: +BS with soft, nondistended abdomen; nontender, no guarding, no rigidity, no organomegaly  Extremities: Right leg edema +2, left leg edema +1  Skin: Right leg medial and lateral chronic venous ulcer with associated erythema and warmth, left leg medial venous ulcer with associated erythema and warmth  Ortho: no jt swelling  Neuro: AAO x 2      LABS/DIAGNOSTIC TESTS:                        10.8   6.85  )-----------( 300      ( 11 Mar 2021 07:15 )             33.1     WBC Count: 6.85 K/uL (03-11 @ 07:15)  WBC Count: 8.30 K/uL (03-10 @ 17:56)    03-11    145  |  112<H>  |  50<H>  ----------------------------<  86  4.1   |  27  |  1.23    Ca    8.8      11 Mar 2021 07:15  Phos  3.4     03-11  Mg     2.6     03-11    TPro  6.1  /  Alb  2.6<L>  /  TBili  0.4  /  DBili  x   /  AST  32  /  ALT  36  /  AlkPhos  83  03-11      LIVER FUNCTIONS - ( 11 Mar 2021 07:15 )  Alb: 2.6 g/dL / Pro: 6.1 g/dL / ALK PHOS: 83 U/L / ALT: 36 U/L DA / AST: 32 U/L / GGT: x           PT/INR - ( 11 Mar 2021 07:15 )   PT: 13.3 sec;   INR: 1.12 ratio         PTT - ( 11 Mar 2021 07:15 )  PTT:29.3 sec  Lactate, Blood: 1.4 mmol/L (03-10 @ 17:56)    ABG -     CULTURES:       RADIOLOGY:  < from: US Duplex Venous Lower Ext Complete, Bilateral (03.11.21 @ 12:38) >    EXAM:  US DPLX LWR EXT VEINS COMPL BI                            PROCEDURE DATE:  03/11/2021          INTERPRETATION:  CLINICAL STATEMENT: Swelling leg.    TECHNIQUE: Ultrasound of bilateral lower extremity deep venous system.    COMPARISON: 12/11/2018    FINDINGS:  There is color and spectral flow, compression and augmentation of the common femoral, superficial femoral and popliteal veins.    There is flow in the posterior tibial vein.    IMPRESSION:  No evidence of DVT.    -----------------------------------------------------------------------------------------------------------------------------------------------------------------------------------------------------------------------------------    < from: CT Cervical Spine No Cont (03.10.21 @ 22:30) >    EXAM:  CT CERVICAL SPINE                          EXAM:  CT BRAIN                            PROCEDURE DATE:  03/10/2021          INTERPRETATION:  CLINICAL INFORMATION:  multiple falls    TECHNIQUE:  1.  Axial CT images were acquired through the head.  2.  Axial CT images were acquired through the cervical spine.  Intravenous contrast: None  Two-dimensional reformats were generated.    COMPARISON STUDY: CT head 12/23/2019, chest x-ray 12/11/2018.    FINDINGS:  CT HEAD:    There is no CT evidence of acute intracranial hemorrhage,  mass effect, midline shift, or acute, large territorial infarct.  The ventricles and sulci are prominent compatible with moderate to advanced cerebral volume loss. The basal cisterns are patent.    Patchy periventricular and subcortical white matter hypodensities are nonspecific, although likely on the basis of chronic microangiopathy.    There are atherosclerotic vascular calcifications at the skull base.    The mastoid air cells and middle ear cavities are grossly clear. There is mucosal thickening in bilateral ethmoid air cells and small ethmoid air cell osteomas. Small mucous retention cysts or polyps are seen in the bilateral maxillary sinuses.    The calvarium and skull base are intact.    CT CERVICALSPINE:    There is  preservation  of the cervical lordosis.  There is no evidence of an acute cervical spine fracture or traumatic malalignment.  Indeterminate 2 cm sclerotic lesion in the right aspect of the T3 vertebral body, which appears to been present dating back to chest x-ray from 12/11/2018.  The paraspinous soft tissues are unremarkable within limits of CT scan.    Degenerative changes:  Moderate multilevel degenerative change, with disc space narrowing, disc osteophyte complexes, as well as bilateral uncovertebral and facet hypertrophy contributing to varying degrees of bilateral foraminal stenosis. Minimal grade 1 anterolisthesis of C7 on T1.    Incidental findings:  The thyroid gland is heterogeneous in attenuation  Coarse calcification is seen in the right lobe of the thyroid.  There are atherosclerotic bilateral carotid calcifications.  Subtle nonspecific groundglass opacities are seen in the visualized lung apices.    IMPRESSION:    CT HEAD: No acute intracranial hemorrhage, mass effect, or osseous fracture.    CT CERVICAL SPINE: No acute cervical spine fracture or traumatic malalignment. Moderate multilevel spondylosis. Indeterminate 2 cm sclerotic lesion in the right aspect of the T3 vertebral body, which appears to been present dating back to chest x-ray from 2018.          < end of copied text >   HPI:  89 female, from home, lives with Son and daughter in law,  with PMHx of Dementia,  HTN, CHF( last echo Dec 2019 EF 40-45% g1dd) , Anxiety,  Chronic Venous ulcers,  prediabetes presents to the ED c/o BLE pain/swelling x 3 months. Pt with intermittent confusion for the past 3 months, AAOX2 ( Baseline). All collateral information was obtained from ED physician who  also spoke to the  son Mikhail ([phone]).  Son was not reachable the same number to me later. As per ED physician,  Son states that  Pt has been having frequent falls in the past 2-3 months, last fall  occurring this morning at 4AM, unwitnessed. Pt's son states that Pt  was found next to   her bed lying down on her back, complaining of b/l  leg pain which has been chronic for the past 2-3 months. She was also noted to have a new Ecchymosis around the L Eye but was awake on their arrival.  Pt is unable to walk well recently secondary to chronic pain. Pt has been having leg swelling for years now, being treated with 3 courses of abx for the past 2 months, last dose of Augmentin x today, been on this for the last 9 days 500mg per day. Pt noted to have heber legs with ulcers and foul-smelling discharge. Pt's appetite otherwise good. Pt denies fever, chills, headache, dizziness, CP, SOB  or any other complaints.  Denies any smoking, alcohol or any substance abuse.     In the ED,   Pt appears comfortable, no signs of any distress  Vitals- 103/58, Hr 75, Afebrile  CT head - performed, awaiting final results   Cr 1.4 ( baseline 0.84 in Dec 2019)  Trops x1 negative        OFF NOTE- MEDS RECONCILED BASED ON PREVIOUS MED REC. TO BE CONFIRMED WITH SON IN AM   (10 Mar 2021 22:46)    History as above, patient admitted from home for worsening bilateral leg edema and pain.  Patient has been having frequent falls at home for the past couple of months.  Patient denies any fever or chills at home but does complain of bilateral leg tenderness, right > left.      REVIEW OF SYSTEMS:  [  ] Not able to illicit  General: no fevers no malaise   Chest: no cough no sob no CP  GI: no nvd no abdominal pain  : no urinary symptoms   Skin: bilateral leg rash  Musculoskeletal: no trauma no LBP  Neuro: no ha's no dizziness     PAST MEDICAL & SURGICAL HISTORY:  Prediabetes    Anxiety    HTN (hypertension)    CHF (congestive heart failure)    Leg swelling    No significant past surgical history      ALLERGIES: No Known Allergies    MEDS:  acetaminophen   Tablet .. 650 milliGRAM(s) Oral every 6 hours PRN  aspirin  chewable 81 milliGRAM(s) Oral daily  atorvastatin 40 milliGRAM(s) Oral at bedtime  ceFAZolin   IVPB 500 milliGRAM(s) IV Intermittent every 8 hours  ceFAZolin   IVPB      cholecalciferol 1000 Unit(s) Oral daily  escitalopram 10 milliGRAM(s) Oral daily  furosemide   Injectable 20 milliGRAM(s) IV Push two times a day  heparin   Injectable 5000 Unit(s) SubCutaneous every 8 hours  insulin lispro (ADMELOG) corrective regimen sliding scale   SubCutaneous three times a day before meals  metoprolol succinate ER 50 milliGRAM(s) Oral daily  pantoprazole    Tablet 40 milliGRAM(s) Oral before breakfast  senna 2 Tablet(s) Oral at bedtime    SOCIAL HISTORY:  Smoker:  Denies  Alcohol: Denies    FAMILY HISTORY: not contributory    VITALS:  Vital Signs Last 24 Hrs  T(C): 36.8 (11 Mar 2021 13:19), Max: 36.8 (11 Mar 2021 13:19)  T(F): 98.2 (11 Mar 2021 13:19), Max: 98.2 (11 Mar 2021 13:19)  HR: 65 (11 Mar 2021 13:19) (60 - 80)  BP: 95/43 (11 Mar 2021 13:19) (95/43 - 112/54)  BP(mean): --  RR: 18 (11 Mar 2021 13:19) (16 - 18)  SpO2: 97% (11 Mar 2021 13:19) (93% - 98%)      PHYSICAL EXAM:  HEENT: normocephalic with moist oral mucosa, Left periorbital ecchymosis (from fall)  Neck: supple no LN's no JVD  Respiratory: lungs clear no rales no rhonchi  Cardiovascular: S1 S2 reg no murmurs  Gastrointestinal: +BS with soft, nondistended abdomen; nontender, no guarding, no rigidity, no organomegaly  Extremities: Right leg edema +2, left leg edema +1  Skin: Right leg medial and lateral chronic venous ulcer with associated erythema and warmth, left leg medial venous ulcer with associated erythema and warmth, warmth and swelling extend up to her knee on the right side.  Ortho: no jt swelling  Neuro: AAO x 2      LABS/DIAGNOSTIC TESTS:                        10.8   6.85  )-----------( 300      ( 11 Mar 2021 07:15 )             33.1     WBC Count: 6.85 K/uL (03-11 @ 07:15)  WBC Count: 8.30 K/uL (03-10 @ 17:56)    03-11    145  |  112<H>  |  50<H>  ----------------------------<  86  4.1   |  27  |  1.23    Ca    8.8      11 Mar 2021 07:15  Phos  3.4     03-11  Mg     2.6     03-11    TPro  6.1  /  Alb  2.6<L>  /  TBili  0.4  /  DBili  x   /  AST  32  /  ALT  36  /  AlkPhos  83  03-11      LIVER FUNCTIONS - ( 11 Mar 2021 07:15 )  Alb: 2.6 g/dL / Pro: 6.1 g/dL / ALK PHOS: 83 U/L / ALT: 36 U/L DA / AST: 32 U/L / GGT: x           PT/INR - ( 11 Mar 2021 07:15 )   PT: 13.3 sec;   INR: 1.12 ratio         PTT - ( 11 Mar 2021 07:15 )  PTT:29.3 sec  Lactate, Blood: 1.4 mmol/L (03-10 @ 17:56)    ABG -     CULTURES:       RADIOLOGY:  < from: US Duplex Venous Lower Ext Complete, Bilateral (03.11.21 @ 12:38) >    EXAM:  US DPLX LWR EXT VEINS COMPL BI                            PROCEDURE DATE:  03/11/2021          INTERPRETATION:  CLINICAL STATEMENT: Swelling leg.    TECHNIQUE: Ultrasound of bilateral lower extremity deep venous system.    COMPARISON: 12/11/2018    FINDINGS:  There is color and spectral flow, compression and augmentation of the common femoral, superficial femoral and popliteal veins.    There is flow in the posterior tibial vein.    IMPRESSION:  No evidence of DVT.    -----------------------------------------------------------------------------------------------------------------------------------------------------------------------------------------------------------------------------------    < from: CT Cervical Spine No Cont (03.10.21 @ 22:30) >    EXAM:  CT CERVICAL SPINE                          EXAM:  CT BRAIN                            PROCEDURE DATE:  03/10/2021          INTERPRETATION:  CLINICAL INFORMATION:  multiple falls    TECHNIQUE:  1.  Axial CT images were acquired through the head.  2.  Axial CT images were acquired through the cervical spine.  Intravenous contrast: None  Two-dimensional reformats were generated.    COMPARISON STUDY: CT head 12/23/2019, chest x-ray 12/11/2018.    FINDINGS:  CT HEAD:    There is no CT evidence of acute intracranial hemorrhage,  mass effect, midline shift, or acute, large territorial infarct.  The ventricles and sulci are prominent compatible with moderate to advanced cerebral volume loss. The basal cisterns are patent.    Patchy periventricular and subcortical white matter hypodensities are nonspecific, although likely on the basis of chronic microangiopathy.    There are atherosclerotic vascular calcifications at the skull base.    The mastoid air cells and middle ear cavities are grossly clear. There is mucosal thickening in bilateral ethmoid air cells and small ethmoid air cell osteomas. Small mucous retention cysts or polyps are seen in the bilateral maxillary sinuses.    The calvarium and skull base are intact.    CT CERVICALSPINE:    There is  preservation  of the cervical lordosis.  There is no evidence of an acute cervical spine fracture or traumatic malalignment.  Indeterminate 2 cm sclerotic lesion in the right aspect of the T3 vertebral body, which appears to been present dating back to chest x-ray from 12/11/2018.  The paraspinous soft tissues are unremarkable within limits of CT scan.    Degenerative changes:  Moderate multilevel degenerative change, with disc space narrowing, disc osteophyte complexes, as well as bilateral uncovertebral and facet hypertrophy contributing to varying degrees of bilateral foraminal stenosis. Minimal grade 1 anterolisthesis of C7 on T1.    Incidental findings:  The thyroid gland is heterogeneous in attenuation  Coarse calcification is seen in the right lobe of the thyroid.  There are atherosclerotic bilateral carotid calcifications.  Subtle nonspecific groundglass opacities are seen in the visualized lung apices.    IMPRESSION:    CT HEAD: No acute intracranial hemorrhage, mass effect, or osseous fracture.    CT CERVICAL SPINE: No acute cervical spine fracture or traumatic malalignment. Moderate multilevel spondylosis. Indeterminate 2 cm sclerotic lesion in the right aspect of the T3 vertebral body, which appears to been present dating back to chest x-ray from 2018.          < end of copied text >

## 2021-03-11 NOTE — CONSULT NOTE ADULT - SUBJECTIVE AND OBJECTIVE BOX
Patient is a 89y old  Female who presents with a chief complaint of Multiple falls at home (11 Mar 2021 13:42)      HPI:  89 female, from home, lives with Son and daughter in law,  with PMHx of Dementia,  HTN, CHF( last echo Dec 2019 EF 40-45% g1dd) , Anxiety,  Chronic Venous ulcers,  prediabetes presents to the ED c/o BLE pain/swelling x 3 months. Pt with intermittent confusion for the past 3 months, AAOX2 ( Baseline). All collateral information was obtained from ED physician who  also spoke to the  son Mikhail ([phone]).  Son was not reachable the same number to me later. As per ED physician,  Son states that  Pt has been having frequent falls in the past 2-3 months, last fall  occurring this morning at 4AM, unwitnessed. Pt's son states that Pt  was found next to   her bed lying down on her back, complaining of b/l  leg pain which has been chronic for the past 2-3 months. She was also noted to have a new Ecchymosis around the L Eye but was awake on their arrival.  Pt is unable to walk well recently secondary to chronic pain. Pt has been having leg swelling for years now, being treated with 3 courses of abx for the past 2 months, last dose of Augmentin x today, been on this for the last 9 days 500mg per day. Pt noted to have heber legs with ulcers and foul-smelling discharge. Pt's appetite otherwise good. Pt denies fever, chills, headache, dizziness, CP, SOB  or any other complaints.  Denies any smoking, alcohol or any substance abuse.     In the ED,   Pt appears comfortable, no signs of any distress  Vitals- 103/58, Hr 75, Afebrile  CT head - performed, awaiting final results   Cr 1.4 ( baseline 0.84 in Dec 2019)  Trops x1 negative        OFF NOTE- MEDS RECONCILED BASED ON PREVIOUS MED REC. TO BE CONFIRMED WITH SON IN AM   (10 Mar 2021 22:46)      Podiatry HPI: Full history as noted above, podiatry consulted for b/l LE swelling and chronic venous ulcers. Patient seen resting in bed, AAOx2. She notes she has had the leg swelling and wounds for a long time, does not recall exactly how long. She states she has not been following with anyone regarding this issue, she usually follows up with her PCP only. She denies nausea, vomiting, fever, chills, shortness of breath, chest pain.     PMH:Prediabetes    Anxiety    HTN (hypertension)    CHF (congestive heart failure)    Leg swelling      Allergies: No Known Allergies    Medications: acetaminophen   Tablet .. 650 milliGRAM(s) Oral every 6 hours PRN  aspirin  chewable 81 milliGRAM(s) Oral daily  atorvastatin 40 milliGRAM(s) Oral at bedtime  ceFAZolin   IVPB 500 milliGRAM(s) IV Intermittent every 8 hours  ceFAZolin   IVPB      cholecalciferol 1000 Unit(s) Oral daily  escitalopram 10 milliGRAM(s) Oral daily  furosemide   Injectable 20 milliGRAM(s) IV Push two times a day  heparin   Injectable 5000 Unit(s) SubCutaneous every 8 hours  insulin lispro (ADMELOG) corrective regimen sliding scale   SubCutaneous three times a day before meals  metoprolol succinate ER 12.5 milliGRAM(s) Oral daily  pantoprazole    Tablet 40 milliGRAM(s) Oral before breakfast  senna 2 Tablet(s) Oral at bedtime    FH:No pertinent family history in first degree relatives      PSX: No significant past surgical history      SH: acetaminophen   Tablet .. 650 milliGRAM(s) Oral every 6 hours PRN  aspirin  chewable 81 milliGRAM(s) Oral daily  atorvastatin 40 milliGRAM(s) Oral at bedtime  ceFAZolin   IVPB 500 milliGRAM(s) IV Intermittent every 8 hours  ceFAZolin   IVPB      cholecalciferol 1000 Unit(s) Oral daily  escitalopram 10 milliGRAM(s) Oral daily  furosemide   Injectable 20 milliGRAM(s) IV Push two times a day  heparin   Injectable 5000 Unit(s) SubCutaneous every 8 hours  insulin lispro (ADMELOG) corrective regimen sliding scale   SubCutaneous three times a day before meals  metoprolol succinate ER 12.5 milliGRAM(s) Oral daily  pantoprazole    Tablet 40 milliGRAM(s) Oral before breakfast  senna 2 Tablet(s) Oral at bedtime      Vital Signs Last 24 Hrs  T(C): 36.8 (11 Mar 2021 13:19), Max: 36.8 (11 Mar 2021 13:19)  T(F): 98.2 (11 Mar 2021 13:19), Max: 98.2 (11 Mar 2021 13:19)  HR: 65 (11 Mar 2021 13:19) (60 - 80)  BP: 95/43 (11 Mar 2021 13:19) (95/43 - 112/54)  BP(mean): --  RR: 18 (11 Mar 2021 13:19) (16 - 18)  SpO2: 97% (11 Mar 2021 13:19) (93% - 98%)    LABS                        10.8   6.85  )-----------( 300      ( 11 Mar 2021 07:15 )             33.1               03-11    145  |  112<H>  |  50<H>  ----------------------------<  86  4.1   |  27  |  1.23    Ca    8.8      11 Mar 2021 07:15  Phos  3.4     03-11  Mg     2.6     03-11    TPro  6.1  /  Alb  2.6<L>  /  TBili  0.4  /  DBili  x   /  AST  32  /  ALT  36  /  AlkPhos  83  03-11      ROS  REVIEW OF SYSTEMS:    CONSTITUTIONAL: all others negative except as noted on HPI      PHYSICAL EXAM  GEN: EVA FISHMAN is a pleasant well-nourished, well developed 89y Female in no acute distress, alert awake, and oriented to person, place and time.   LE Focused:    Vasc:  DP/PT nonpalpable 2/2 +2 pitting edema b/l, improved edema noted to b/l ankle where previous dressing was applied with visible skin lines, CFT brisk to digits, TG warm to warm L > R; b/l LE extremity erythema  Derm:   -deroofed blister noted to anterior medial aspect of distal 1/3 R leg with serous drainage, and periwound erythema  -wound noted to anterior lateral aspect of distal 1/3 R leg measuring ~ 4x4x0.2 with fibrogranular wound bed, serous drainage, periwound erythema and maceration, no malodor, no undermining, no PTB  -wound noted to medial aspect of distal 1/3 L leg measuring ~5.5x3x0.2 with fibrogranular wound bed, serous drainage, periwound erythema and maceration, no malodor, no undermining, no PTB  Neuro: protective sensation grossly intact at level of digits b/l  MSK: mild tenderness to palpation of periwound areas, muscle strength 4/4 in all compartments b/l

## 2021-03-11 NOTE — PROGRESS NOTE ADULT - PROBLEM SELECTOR PLAN 1
Pt admitted for chronic leg pain, swelling and weakness leading to multiple falls in past   Pt had her recent most fall yesterday suffering trauma to her head  Pt has chronic venous ulcers which appear infected with foul smelling discharge  started on iv abx   Symptoms likely due to Venous stasis and less likley to be Cardiac in origin      Blood and wound cultures before antibiotics  PT evaluation   Fall precaution   F/U echo and Venous doppler in am  ID- Dr. Eaton Pt admitted for chronic leg pain, swelling and weakness leading to multiple falls in past   Pt had her recent most fall yesterday suffering trauma to her head  Pt has chronic venous ulcers which appear infected with foul smelling discharge  Symptoms likely due to Venous stasis and less likley to be Cardiac in origin      Blood and wound cultures before antibiotics  PT evaluation   Fall precaution   Venous doppler negative for DVT  On Cefazolin  ID- Dr. Eaton

## 2021-03-11 NOTE — PROGRESS NOTE ADULT - PROBLEM SELECTOR PLAN 4
Pt has PMH of CHF  ( last echo Dec 2019 EF 40-45% g1dd  F/U strict i/o, daily weight   f/u ECHO  Primary team to confirm med rec from family In am  started on lasix and Metoprolol ( based on previous med rec) Pt has PMH of CHF  ( last echo Dec 2019 EF 40-45% g1dd  F/U strict i/o, daily weight   f/u ECHO  started on lasix and Metoprolol

## 2021-03-11 NOTE — ADVANCED PRACTICE NURSE CONSULT - ASSESSMENT
This is a 89yr old female patient admitted for Chronic Ulcerations, presenting with evidence of Bilateral Lower Extremity ulcerations to which there will be a Podiatry consultation for evaluation and treatment of this issue. There is currently no further need for wound care specialist consultation at this time.

## 2021-03-11 NOTE — PROGRESS NOTE ADULT - PROBLEM SELECTOR PLAN 3
Cr 1.4 ( baseline 0.84 in Dec 2019)  DANISH Likely due to dehydration   f/u bmp and urine lytes in am Cr 1.4 ( baseline 0.84 in Dec 2019)  DANISH Likely due to dehydration   Resolved with IV fluids

## 2021-03-11 NOTE — PROGRESS NOTE ADULT - PROBLEM SELECTOR PLAN 2
Pt noted to have multiple episodes of Mechanical fall since past 2-3 months  Last fall being yesterday with head trauma    CT head - no acute changes   f/u orthostatics, ECHO and Carotid doppler   f/u PT Pt noted to have multiple episodes of Mechanical fall since past 2-3 months  Last fall being yesterday with head trauma    CT head - no acute changes   f/u orthostatics, ECHO   PT: Rehab

## 2021-03-11 NOTE — PHYSICAL THERAPY INITIAL EVALUATION ADULT - DIAGNOSIS, PT EVAL
Pt is a 89 y/o female whom has PMH of CHF, HTN, anxiety, chronic venous ulcers, dementia, pre-diabetes, history of multiple falls within past 2-3 months, worsening B LE swelling and B LE pain secondary to swelling. Pt presents to ED after recent fall unwitnessed Patient presents w/ weakness, decreased balance and unsteady gait that limits patient's mobility and function.

## 2021-03-11 NOTE — PHYSICAL THERAPY INITIAL EVALUATION ADULT - GAIT DEVIATIONS NOTED, PT EVAL
unsteady gait/decreased natasha/decreased velocity of limb motion/decreased step length/increased stride width/decreased swing-to-stance ratio

## 2021-03-11 NOTE — PHYSICAL THERAPY INITIAL EVALUATION ADULT - PERTINENT HX OF CURRENT PROBLEM, REHAB EVAL
Pt. admitted from home due to swelling B/L LE; pt. w/ h/o multiple falls, Pt. admitted from home due to swelling B/L LE; pt. w/ h/o multiple falls, Dementia, CHF, Venous ulcers

## 2021-03-11 NOTE — PHYSICAL THERAPY INITIAL EVALUATION ADULT - GENERAL OBSERVATIONS, REHAB EVAL
Pt received supine in bed with bilateral LE swelling Pt received supine in bed with bilateral LE swelling, (+) IV line, AxOx3, w/ periods of being forgetfull. Pt received supine in bed with bilateral LE swelling, (+) IV line, AxOx2, w/ periods of being forgetfull.

## 2021-03-12 LAB
ANION GAP SERPL CALC-SCNC: 4 MMOL/L — LOW (ref 5–17)
BUN SERPL-MCNC: 35 MG/DL — HIGH (ref 7–18)
CALCIUM SERPL-MCNC: 8.5 MG/DL — SIGNIFICANT CHANGE UP (ref 8.4–10.5)
CHLORIDE SERPL-SCNC: 110 MMOL/L — HIGH (ref 96–108)
CO2 SERPL-SCNC: 28 MMOL/L — SIGNIFICANT CHANGE UP (ref 22–31)
CREAT SERPL-MCNC: 1.12 MG/DL — SIGNIFICANT CHANGE UP (ref 0.5–1.3)
GLUCOSE BLDC GLUCOMTR-MCNC: 102 MG/DL — HIGH (ref 70–99)
GLUCOSE BLDC GLUCOMTR-MCNC: 103 MG/DL — HIGH (ref 70–99)
GLUCOSE BLDC GLUCOMTR-MCNC: 150 MG/DL — HIGH (ref 70–99)
GLUCOSE BLDC GLUCOMTR-MCNC: 96 MG/DL — SIGNIFICANT CHANGE UP (ref 70–99)
GLUCOSE SERPL-MCNC: 93 MG/DL — SIGNIFICANT CHANGE UP (ref 70–99)
HCT VFR BLD CALC: 32.7 % — LOW (ref 34.5–45)
HGB BLD-MCNC: 10.7 G/DL — LOW (ref 11.5–15.5)
MAGNESIUM SERPL-MCNC: 2.3 MG/DL — SIGNIFICANT CHANGE UP (ref 1.6–2.6)
MCHC RBC-ENTMCNC: 29.1 PG — SIGNIFICANT CHANGE UP (ref 27–34)
MCHC RBC-ENTMCNC: 32.7 GM/DL — SIGNIFICANT CHANGE UP (ref 32–36)
MCV RBC AUTO: 88.9 FL — SIGNIFICANT CHANGE UP (ref 80–100)
NRBC # BLD: 0 /100 WBCS — SIGNIFICANT CHANGE UP (ref 0–0)
PHOSPHATE SERPL-MCNC: 2.9 MG/DL — SIGNIFICANT CHANGE UP (ref 2.5–4.5)
PLATELET # BLD AUTO: 291 K/UL — SIGNIFICANT CHANGE UP (ref 150–400)
POTASSIUM SERPL-MCNC: 3.9 MMOL/L — SIGNIFICANT CHANGE UP (ref 3.5–5.3)
POTASSIUM SERPL-SCNC: 3.9 MMOL/L — SIGNIFICANT CHANGE UP (ref 3.5–5.3)
RBC # BLD: 3.68 M/UL — LOW (ref 3.8–5.2)
RBC # FLD: 14.6 % — HIGH (ref 10.3–14.5)
SODIUM SERPL-SCNC: 142 MMOL/L — SIGNIFICANT CHANGE UP (ref 135–145)
WBC # BLD: 7.52 K/UL — SIGNIFICANT CHANGE UP (ref 3.8–10.5)
WBC # FLD AUTO: 7.52 K/UL — SIGNIFICANT CHANGE UP (ref 3.8–10.5)

## 2021-03-12 PROCEDURE — 99232 SBSQ HOSP IP/OBS MODERATE 35: CPT | Mod: GC

## 2021-03-12 RX ADMIN — ATORVASTATIN CALCIUM 40 MILLIGRAM(S): 80 TABLET, FILM COATED ORAL at 21:32

## 2021-03-12 RX ADMIN — HEPARIN SODIUM 5000 UNIT(S): 5000 INJECTION INTRAVENOUS; SUBCUTANEOUS at 05:32

## 2021-03-12 RX ADMIN — Medication 1000 UNIT(S): at 11:08

## 2021-03-12 RX ADMIN — Medication 81 MILLIGRAM(S): at 11:08

## 2021-03-12 RX ADMIN — Medication 12.5 MILLIGRAM(S): at 05:32

## 2021-03-12 RX ADMIN — Medication 20 MILLIGRAM(S): at 05:31

## 2021-03-12 RX ADMIN — ESCITALOPRAM OXALATE 10 MILLIGRAM(S): 10 TABLET, FILM COATED ORAL at 11:08

## 2021-03-12 RX ADMIN — Medication 100 MILLIGRAM(S): at 21:32

## 2021-03-12 RX ADMIN — SENNA PLUS 2 TABLET(S): 8.6 TABLET ORAL at 21:32

## 2021-03-12 RX ADMIN — PANTOPRAZOLE SODIUM 40 MILLIGRAM(S): 20 TABLET, DELAYED RELEASE ORAL at 05:30

## 2021-03-12 RX ADMIN — Medication 100 MILLIGRAM(S): at 13:03

## 2021-03-12 RX ADMIN — Medication 20 MILLIGRAM(S): at 16:53

## 2021-03-12 RX ADMIN — HEPARIN SODIUM 5000 UNIT(S): 5000 INJECTION INTRAVENOUS; SUBCUTANEOUS at 21:32

## 2021-03-12 RX ADMIN — Medication 100 MILLIGRAM(S): at 05:31

## 2021-03-12 NOTE — PROGRESS NOTE ADULT - PROBLEM SELECTOR PLAN 4
Pt has PMH of CHF  ( last echo Dec 2019 EF 40-45% g1dd  F/U strict i/o, daily weight   f/u ECHO  started on lasix and Metoprolol

## 2021-03-12 NOTE — PROGRESS NOTE ADULT - SUBJECTIVE AND OBJECTIVE BOX
89y Female is under our care for bilateral leg cellulitis.  Patient was seen laying comfortably in bed with no acute distress.  Patients blood cultures were negative and WBC count WNL.  Patient still complains of tenderness on bilateral legs with slight improvement on erythema but bilateral legs remains warm.  Patient remains afebrile and denies any chills.    REVIEW OF SYSTEMS:  [  ] Not able to illicit  General: no fevers no malaise   Chest: no cough no sob no CP  GI: no nvd no abdominal pain  : no urinary symptoms   Skin: bilateral leg rash  Musculoskeletal: no trauma no LBP  Neuro: no ha's no dizziness     MEDS:  ceFAZolin   IVPB 500 milliGRAM(s) IV Intermittent every 8 hours  ceFAZolin   IVPB        ALLERGIES: Allergies    No Known Allergies    Intolerances        VITALS:  Vital Signs Last 24 Hrs  T(C): 37.3 (12 Mar 2021 05:29), Max: 37.3 (12 Mar 2021 05:29)  T(F): 99.2 (12 Mar 2021 05:29), Max: 99.2 (12 Mar 2021 05:29)  HR: 85 (12 Mar 2021 05:29) (65 - 85)  BP: 123/59 (12 Mar 2021 05:29) (95/43 - 133/48)  BP(mean): --  RR: 16 (12 Mar 2021 05:29) (16 - 18)  SpO2: 94% (12 Mar 2021 05:29) (94% - 97%)      PHYSICAL EXAM:  HEENT: normocephalic with moist oral mucosa, Left periorbital ecchymosis (from fall)  Neck: supple no LN's no JVD  Respiratory: lungs clear no rales no rhonchi  Cardiovascular: S1 S2 reg no murmurs  Gastrointestinal: +BS with soft, nondistended abdomen; nontender, no guarding, no rigidity, no organomegaly  Extremities: Right leg edema +2, left leg edema +1  Skin: Right leg medial and lateral chronic venous ulcer with associated erythema and warmth, left leg medial venous ulcer with associated erythema and warmth, warmth and swelling extend up to her knee on the right side.  Ortho: no jt swelling  Neuro: AAO x 2    LABS/DIAGNOSTIC TESTS:                        10.7   7.52  )-----------( 291      ( 12 Mar 2021 07:31 )             32.7     WBC Count: 7.52 K/uL (03-12 @ 07:31)  WBC Count: 6.85 K/uL (03-11 @ 07:15)  WBC Count: 8.30 K/uL (03-10 @ 17:56)    03-12    142  |  110<H>  |  35<H>  ----------------------------<  93  3.9   |  28  |  1.12    Ca    8.5      12 Mar 2021 07:31  Phos  2.9     03-12  Mg     2.3     03-12    TPro  6.1  /  Alb  2.6<L>  /  TBili  0.4  /  DBili  x   /  AST  32  /  ALT  36  /  AlkPhos  83  03-11      CULTURES:   .Blood Blood  03-10 @ 21:47   No growth to date.  --  --        RADIOLOGY:  no new studies

## 2021-03-12 NOTE — PROGRESS NOTE ADULT - PROBLEM SELECTOR PLAN 7
Pt has PMH of anxiety   Pt on lexapro at home  started on lexapro Pt has PMH of anxiety   Pt on lexapro at home  Continue home medication

## 2021-03-12 NOTE — DIETITIAN INITIAL EVALUATION ADULT. - PERTINENT LABORATORY DATA
03-12 Na142 mmol/L Glu 93 mg/dL K+ 3.9 mmol/L Cr  1.12 mg/dL BUN 35 mg/dL<H> 03-12 Phos 2.9 mg/dL 03-11 Alb 2.6 g/dL<L> 03-11 Chol 101 mg/dL LDL --    HDL 58 mg/dL Trig 54 mg/dL, A1C 6.3%

## 2021-03-12 NOTE — PROGRESS NOTE ADULT - PROBLEM SELECTOR PLAN 6
Pt has PMH of prediabetes  RBS- 109  F/U A1C & FS in am   started on HSS Pt has PMH of prediabetes  RBS- 109  A1C 6.3  started on HSS  Monitor BG

## 2021-03-12 NOTE — PROGRESS NOTE ADULT - SUBJECTIVE AND OBJECTIVE BOX
Patient seen resting in bed, AAOx2. For follow up bilateral LE ulcers. No acute change overnight    PMH:Prediabetes    Anxiety    HTN (hypertension)    CHF (congestive heart failure)    Leg swelling      Allergies: No Known Allergies    Vital Signs Last 24 Hrs  T(C): 37.3 (12 Mar 2021 05:29), Max: 37.3 (12 Mar 2021 05:29)  T(F): 99.2 (12 Mar 2021 05:29), Max: 99.2 (12 Mar 2021 05:29)  HR: 85 (12 Mar 2021 05:29) (65 - 85)  BP: 123/59 (12 Mar 2021 05:29) (95/43 - 133/48)  BP(mean): --  RR: 16 (12 Mar 2021 05:29) (16 - 18)  SpO2: 94% (12 Mar 2021 05:29) (94% - 97%)    PHYSICAL EXAM  LE Focused:    Vasc:  DP/PT nonpalpable 2/2 +2 pitting edema b/l, improved edema noted to b/l ankle where previous dressing was applied with visible skin lines, CFT brisk to digits, TG warm to warm L > R; b/l LE extremity erythema  Derm:   -deroofed blister noted to anterior medial aspect of distal 1/3 R leg with serous drainage, and periwound erythema  -wound noted to anterior lateral aspect of distal 1/3 R leg measuring ~ 4x4x0.2 with fibrogranular wound bed, serous drainage, periwound erythema and maceration, no malodor, no undermining, no PTB  -wound noted to medial aspect of distal 1/3 L leg measuring ~5.5x3x0.2 with fibrogranular wound bed, serous drainage, periwound erythema and maceration, no malodor, no undermining, no PTB  Neuro: protective sensation grossly intact at level of digits b/l  MSK: mild tenderness to palpation of periwound areas, muscle strength 4/4 in all compartments b/l              Patient seen resting in bed, AAOx2. For follow up bilateral LE ulcers. No acute change overnight    PMH:Prediabetes    Anxiety    HTN (hypertension)    CHF (congestive heart failure)    Leg swelling      Allergies: No Known Allergies    Vital Signs Last 24 Hrs  T(C): 37.3 (12 Mar 2021 05:29), Max: 37.3 (12 Mar 2021 05:29)  T(F): 99.2 (12 Mar 2021 05:29), Max: 99.2 (12 Mar 2021 05:29)  HR: 85 (12 Mar 2021 05:29) (65 - 85)  BP: 123/59 (12 Mar 2021 05:29) (95/43 - 133/48)  BP(mean): --  RR: 16 (12 Mar 2021 05:29) (16 - 18)  SpO2: 94% (12 Mar 2021 05:29) (94% - 97%)    PHYSICAL EXAM  LE Focused:    Vasc:  DP/PT nonpalpable 2/2 +2 pitting edema b/l, improved edema noted to b/l ankle where previous dressing was applied with visible skin lines, CFT brisk to digits, TG warm to warm L > R; b/l LE extremity erythema  Derm:   -deroofed blister noted to anterior medial aspect of distal 1/3 R leg with serous drainage, and periwound erythema  -wound noted to anterior lateral aspect of distal 1/3 R leg measuring ~ 4x4x0.2 with fibrogranular wound bed, serous drainage, periwound erythema and maceration, no malodor, no undermining, no PTB  -wound noted to medial aspect of distal 1/3 L leg measuring ~5.5x3x0.2 with fibrogranular wound bed, serous drainage, periwound erythema and maceration, no malodor, no undermining, no PTB  Neuro: protective sensation grossly intact at level of digits b/l  MSK: mild tenderness to palpation of periwound areas, muscle strength 4/4 in all compartments b/l     LABS:   10.7   7.52  )-----------( 291      ( 12 Mar 2021 07:31 )             32.7     03-12    142  |  110<H>  |  35<H>  ----------------------------<  93  3.9   |  28  |  1.12    Ca    8.5      12 Mar 2021 07:31  Phos  2.9     03-12  Mg     2.3     03-12    TPro  6.1  /  Alb  2.6<L>  /  TBili  0.4  /  DBili  x   /  AST  32  /  ALT  36  /  AlkPhos  83  03-11         Patient seen resting in bed, AAOx2. For follow up bilateral LE ulcers. No acute change overnight    PMH:Prediabetes    Anxiety    HTN (hypertension)    CHF (congestive heart failure)    Leg swelling      Allergies: No Known Allergies    Vital Signs Last 24 Hrs  T(C): 37.3 (12 Mar 2021 05:29), Max: 37.3 (12 Mar 2021 05:29)  T(F): 99.2 (12 Mar 2021 05:29), Max: 99.2 (12 Mar 2021 05:29)  HR: 85 (12 Mar 2021 05:29) (65 - 85)  BP: 123/59 (12 Mar 2021 05:29) (95/43 - 133/48)  BP(mean): --  RR: 16 (12 Mar 2021 05:29) (16 - 18)  SpO2: 94% (12 Mar 2021 05:29) (94% - 97%)    PHYSICAL EXAM  LE Focused:    Vasc:  DP/PT nonpalpable 2/2 +2 pitting edema b/l, improved edema noted to b/l ankle where previous dressing was applied with visible skin lines, CFT brisk to digits, TG warm to warm L > R; b/l LE extremity erythema  Derm:   -deroofed blister noted to anterior medial aspect of distal 1/3 R leg measuring 9.0 cm x 6.0 cm with granular base, mild serous drainage, and periwound erythema  -wound noted to anterior lateral aspect of distal 1/3 R leg measuring ~ 4x4x0.2 with fibrogranular wound bed, serous drainage, periwound erythema and maceration, no malodor, no undermining, no PTB  -wound noted to medial aspect of distal 1/3 L leg measuring ~5.5x3x0.2 with granular wound bed, mild serous drainage, periwound erythema and maceration, no malodor, no undermining, no PTB  Neuro: protective sensation grossly intact at level of digits b/l  MSK: mild tenderness to palpation of periwound areas, muscle strength 4/4 in all compartments b/l     LABS:               10.7   7.52  )-----------( 291      ( 12 Mar 2021 07:31 )             32.7     03-12    142  |  110<H>  |  35<H>  ----------------------------<  93  3.9   |  28  |  1.12    Ca    8.5      12 Mar 2021 07:31  Phos  2.9     03-12  Mg     2.3     03-12    TPro  6.1  /  Alb  2.6<L>  /  TBili  0.4  /  DBili  x   /  AST  32  /  ALT  36  /  AlkPhos  83  03-11         Patient seen resting in bed, AAOx2. For follow up bilateral LE ulcers. No acute change overnight    PMH:Prediabetes    Anxiety    HTN (hypertension)    CHF (congestive heart failure)    Leg swelling      Allergies: No Known Allergies    Vital Signs Last 24 Hrs  T(C): 37.3 (12 Mar 2021 05:29), Max: 37.3 (12 Mar 2021 05:29)  T(F): 99.2 (12 Mar 2021 05:29), Max: 99.2 (12 Mar 2021 05:29)  HR: 85 (12 Mar 2021 05:29) (65 - 85)  BP: 123/59 (12 Mar 2021 05:29) (95/43 - 133/48)  BP(mean): --  RR: 16 (12 Mar 2021 05:29) (16 - 18)  SpO2: 94% (12 Mar 2021 05:29) (94% - 97%)    PHYSICAL EXAM  LE Focused:    Vasc:  DP/PT nonpalpable 2/2 +2 pitting edema b/l, improved edema noted to b/l ankle where previous dressing was applied with visible skin lines, CFT brisk to digits, TG warm to warm L > R; b/l LE extremity erythema  Derm:   -deroofed blister noted to anterior medial aspect of distal 1/3 R leg measuring 9.0 cm x 6.0 cm with granular base, mild serous drainage, and periwound erythema, cellulitis noted  -wound noted to anterior lateral aspect of distal 1/3 R leg measuring ~ 4x4x0.2 with fibrogranular wound bed, serous drainage, periwound erythema and maceration, no malodor, no undermining, no PTB, cellulitis  -wound noted to medial aspect of distal 1/3 L leg measuring ~5.5x3x0.2 with granular wound bed, mild serous drainage, periwound erythema and maceration, no malodor, no undermining, no PTB  Neuro: protective sensation grossly intact at level of digits b/l  MSK: mild tenderness to palpation of periwound areas, muscle strength 4/4 in all compartments b/l     LABS:               10.7   7.52  )-----------( 291      ( 12 Mar 2021 07:31 )             32.7     03-12    142  |  110<H>  |  35<H>  ----------------------------<  93  3.9   |  28  |  1.12    Ca    8.5      12 Mar 2021 07:31  Phos  2.9     03-12  Mg     2.3     03-12    TPro  6.1  /  Alb  2.6<L>  /  TBili  0.4  /  DBili  x   /  AST  32  /  ALT  36  /  AlkPhos  83  03-11

## 2021-03-12 NOTE — PROGRESS NOTE ADULT - ASSESSMENT
Bilateral leg cellulitis  Bilateral leg venous stasis ulcers    Plan: Continue Ancef 500 mg iv q8 Bilateral leg cellulitis  Bilateral leg venous stasis ulcers    Plan: Continue Ancef 500 mg iv q8  If being discharged tomorrow, can go on Keflex 500mg po TID for 1 week

## 2021-03-12 NOTE — PROGRESS NOTE ADULT - SUBJECTIVE AND OBJECTIVE BOX
PGY-1 Progress Note discussed with attending    PAGER #: [[phone]] TILL 5:00 PM  PLEASE CONTACT ON CALL TEAM:  - On Call Team (Please refer to Emma) FROM 5:00 PM - 8:30PM  - Nightfloat Team FROM 8:30 -7:30 AM    CHIEF COMPLAINT & BRIEF HOSPITAL COURSE:    INTERVAL HPI/OVERNIGHT EVENTS:       MEDICATIONS  (STANDING):  aspirin  chewable 81 milliGRAM(s) Oral daily  atorvastatin 40 milliGRAM(s) Oral at bedtime  ceFAZolin   IVPB 500 milliGRAM(s) IV Intermittent every 8 hours  ceFAZolin   IVPB      cholecalciferol 1000 Unit(s) Oral daily  escitalopram 10 milliGRAM(s) Oral daily  furosemide   Injectable 20 milliGRAM(s) IV Push two times a day  heparin   Injectable 5000 Unit(s) SubCutaneous every 8 hours  insulin lispro (ADMELOG) corrective regimen sliding scale   SubCutaneous three times a day before meals  metoprolol succinate ER 12.5 milliGRAM(s) Oral daily  pantoprazole    Tablet 40 milliGRAM(s) Oral before breakfast  senna 2 Tablet(s) Oral at bedtime    MEDICATIONS  (PRN):  acetaminophen   Tablet .. 650 milliGRAM(s) Oral every 6 hours PRN Temp greater or equal to 38C (100.4F), Mild Pain (1 - 3)      REVIEW OF SYSTEMS:  CONSTITUTIONAL: No fever, weight loss, or fatigue  RESPIRATORY: No cough, wheezing, chills or hemoptysis; No shortness of breath  CARDIOVASCULAR: No chest pain, palpitations, dizziness, or leg swelling  GASTROINTESTINAL: No abdominal pain. No nausea, vomiting, or hematemesis; No diarrhea or constipation. No melena or hematochezia.  GENITOURINARY: No dysuria or hematuria, urinary frequency  NEUROLOGICAL: No headaches, memory loss, loss of strength, numbness, or tremors  SKIN: No itching, burning, rashes, or lesions     Vital Signs Last 24 Hrs  T(C): 37.3 (12 Mar 2021 05:29), Max: 37.3 (12 Mar 2021 05:29)  T(F): 99.2 (12 Mar 2021 05:29), Max: 99.2 (12 Mar 2021 05:29)  HR: 85 (12 Mar 2021 05:29) (65 - 85)  BP: 123/59 (12 Mar 2021 05:29) (95/43 - 133/48)  BP(mean): --  RR: 16 (12 Mar 2021 05:29) (16 - 18)  SpO2: 94% (12 Mar 2021 05:29) (94% - 97%)    PHYSICAL EXAMINATION:  GENERAL: NAD, well built  HEAD:  Atraumatic, Normocephalic  EYES:  conjunctiva and sclera clear  NECK: Supple, No JVD, Normal thyroid  CHEST/LUNG: Clear to auscultation. Clear to percussion bilaterally; No rales, rhonchi, wheezing, or rubs  HEART: Regular rate and rhythm; No murmurs, rubs, or gallops  ABDOMEN: Soft, Nontender, Nondistended; Bowel sounds present  NERVOUS SYSTEM:  Alert & Oriented X3,    EXTREMITIES:  2+ Peripheral Pulses, No clubbing, cyanosis, or edema  SKIN: warm dry                          10.7   7.52  )-----------( 291      ( 12 Mar 2021 07:31 )             32.7     03-12    142  |  110<H>  |  35<H>  ----------------------------<  93  3.9   |  28  |  1.12    Ca    8.5      12 Mar 2021 07:31  Phos  2.9     03-12  Mg     2.3     03-12    TPro  6.1  /  Alb  2.6<L>  /  TBili  0.4  /  DBili  x   /  AST  32  /  ALT  36  /  AlkPhos  83  03-11    LIVER FUNCTIONS - ( 11 Mar 2021 07:15 )  Alb: 2.6 g/dL / Pro: 6.1 g/dL / ALK PHOS: 83 U/L / ALT: 36 U/L DA / AST: 32 U/L / GGT: x           CARDIAC MARKERS ( 11 Mar 2021 07:15 )  0.024 ng/mL / x     / x     / x     / x      CARDIAC MARKERS ( 10 Mar 2021 17:56 )  0.021 ng/mL / x     / 305 U/L / x     / x          PT/INR - ( 11 Mar 2021 07:15 )   PT: 13.3 sec;   INR: 1.12 ratio         PTT - ( 11 Mar 2021 07:15 )  PTT:29.3 sec    CAPILLARY BLOOD GLUCOSE      POCT Blood Glucose.: 102 mg/dL (12 Mar 2021 08:21)        Culture - Blood (collected 10 Mar 2021 21:47)  Source: .Blood Blood  Preliminary Report (11 Mar 2021 22:01):    No growth to date.    Culture - Blood (collected 10 Mar 2021 21:47)  Source: .Blood Blood  Preliminary Report (11 Mar 2021 22:01):    No growth to date.        RADIOLOGY & ADDITIONAL TESTS:                   PGY-1 Progress Note discussed with attending    PAGER #: [[phone]] TILL 5:00 PM  PLEASE CONTACT ON CALL TEAM:  - On Call Team (Please refer to Emma) FROM 5:00 PM - 8:30PM  - Nightfloat Team FROM 8:30 -7:30 AM    CHIEF COMPLAINT & BRIEF HOSPITAL COURSE:  89 female, from home, lives with Son and daughter in law,  with PMHx of Dementia,  HTN, CHF( last echo Dec 2019 EF 40-45% g1dd) , Anxiety,  Chronic Venous ulcers,  prediabetes presents to the ED c/o BLE pain/swelling x 3 months. Pt with intermittent confusion for the past 3 months, AAOX2 ( Baseline). All collateral information was obtained from ED physician who  also spoke to the  son Mikhail ([phone]).  Son was not reachable the same number to me later. As per ED physician,  Son states that  Pt has been having frequent falls in the past 2-3 months, last fall  occurring this morning at 4AM, unwitnessed. Pt's son states that Pt  was found next to   her bed lying down on her back, complaining of b/l  leg pain which has been chronic for the past 2-3 months. She was also noted to have a new Ecchymosis around the L Eye but was awake on their arrival.  Pt is unable to walk well recently secondary to chronic pain. Pt has been having leg swelling for years now, being treated with 3 courses of abx for the past 2 months, last dose of Augmentin x today, been on this for the last 9 days 500mg per day. Pt noted to have heber legs with ulcers and foul-smelling discharge. Pt's appetite otherwise good. Pt denies fever, chills, headache, dizziness, CP, SOB  or any other complaints.  Denies any smoking, alcohol or any substance abuse.     INTERVAL HPI/OVERNIGHT EVENTS:   No acute overnight events.     MEDICATIONS  (STANDING):  aspirin  chewable 81 milliGRAM(s) Oral daily  atorvastatin 40 milliGRAM(s) Oral at bedtime  ceFAZolin   IVPB 500 milliGRAM(s) IV Intermittent every 8 hours  ceFAZolin   IVPB      cholecalciferol 1000 Unit(s) Oral daily  escitalopram 10 milliGRAM(s) Oral daily  furosemide   Injectable 20 milliGRAM(s) IV Push two times a day  heparin   Injectable 5000 Unit(s) SubCutaneous every 8 hours  insulin lispro (ADMELOG) corrective regimen sliding scale   SubCutaneous three times a day before meals  metoprolol succinate ER 12.5 milliGRAM(s) Oral daily  pantoprazole    Tablet 40 milliGRAM(s) Oral before breakfast  senna 2 Tablet(s) Oral at bedtime    MEDICATIONS  (PRN):  acetaminophen   Tablet .. 650 milliGRAM(s) Oral every 6 hours PRN Temp greater or equal to 38C (100.4F), Mild Pain (1 - 3)      REVIEW OF SYSTEMS:  CONSTITUTIONAL: No fever, weight loss, or fatigue  RESPIRATORY: No cough, wheezing, chills or hemoptysis; No shortness of breath  CARDIOVASCULAR: No chest pain, palpitations, dizziness, or leg swelling  GASTROINTESTINAL: No abdominal pain. No nausea, vomiting, or hematemesis; No diarrhea or constipation. No melena or hematochezia.  GENITOURINARY: No dysuria or hematuria, urinary frequency  NEUROLOGICAL: No headaches, memory loss, loss of strength, numbness, or tremors  SKIN: No itching, burning, rashes, or lesions     Vital Signs Last 24 Hrs  T(C): 37.3 (12 Mar 2021 05:29), Max: 37.3 (12 Mar 2021 05:29)  T(F): 99.2 (12 Mar 2021 05:29), Max: 99.2 (12 Mar 2021 05:29)  HR: 85 (12 Mar 2021 05:29) (65 - 85)  BP: 123/59 (12 Mar 2021 05:29) (95/43 - 133/48)  BP(mean): --  RR: 16 (12 Mar 2021 05:29) (16 - 18)  SpO2: 94% (12 Mar 2021 05:29) (94% - 97%)    PHYSICAL EXAMINATION:  GENERAL: NAD, well built  HEAD:  Atraumatic, Normocephalic  EYES:  conjunctiva and sclera clear  NECK: Supple, No JVD, Normal thyroid  CHEST/LUNG: Clear to auscultation. Clear to percussion bilaterally; No rales, rhonchi, wheezing, or rubs  HEART: Regular rate and rhythm; No murmurs, rubs, or gallops  ABDOMEN: Soft, Nontender, Nondistended; Bowel sounds present  NERVOUS SYSTEM:  Alert & Oriented X3,    EXTREMITIES:  2+ Peripheral Pulses, No clubbing, cyanosis, or edema  SKIN: warm dry                          10.7   7.52  )-----------( 291      ( 12 Mar 2021 07:31 )             32.7     03-12    142  |  110<H>  |  35<H>  ----------------------------<  93  3.9   |  28  |  1.12    Ca    8.5      12 Mar 2021 07:31  Phos  2.9     03-12  Mg     2.3     03-12    TPro  6.1  /  Alb  2.6<L>  /  TBili  0.4  /  DBili  x   /  AST  32  /  ALT  36  /  AlkPhos  83  03-11    LIVER FUNCTIONS - ( 11 Mar 2021 07:15 )  Alb: 2.6 g/dL / Pro: 6.1 g/dL / ALK PHOS: 83 U/L / ALT: 36 U/L DA / AST: 32 U/L / GGT: x           CARDIAC MARKERS ( 11 Mar 2021 07:15 )  0.024 ng/mL / x     / x     / x     / x      CARDIAC MARKERS ( 10 Mar 2021 17:56 )  0.021 ng/mL / x     / 305 U/L / x     / x          PT/INR - ( 11 Mar 2021 07:15 )   PT: 13.3 sec;   INR: 1.12 ratio         PTT - ( 11 Mar 2021 07:15 )  PTT:29.3 sec    CAPILLARY BLOOD GLUCOSE      POCT Blood Glucose.: 102 mg/dL (12 Mar 2021 08:21)        Culture - Blood (collected 10 Mar 2021 21:47)  Source: .Blood Blood  Preliminary Report (11 Mar 2021 22:01):    No growth to date.    Culture - Blood (collected 10 Mar 2021 21:47)  Source: .Blood Blood  Preliminary Report (11 Mar 2021 22:01):    No growth to date.        RADIOLOGY & ADDITIONAL TESTS:                   PGY-1 Progress Note discussed with attending    PAGER #: [[phone]] TILL 5:00 PM  PLEASE CONTACT ON CALL TEAM:  - On Call Team (Please refer to Emma) FROM 5:00 PM - 8:30PM  - Nightfloat Team FROM 8:30 -7:30 AM    CHIEF COMPLAINT & BRIEF HOSPITAL COURSE:  89 female, from home, lives with Son and daughter in law,  with PMHx of Dementia,  HTN, CHF( last echo Dec 2019 EF 40-45% g1dd) , Anxiety,  Chronic Venous ulcers,  prediabetes presents to the ED c/o BLE pain/swelling x 3 months. Pt with intermittent confusion for the past 3 months, AAOX2 ( Baseline). All collateral information was obtained from ED physician who  also spoke to the  son Mikhail ([phone]).  Son was not reachable the same number to me later. As per ED physician,  Son states that  Pt has been having frequent falls in the past 2-3 months, last fall  occurring this morning at 4AM, unwitnessed. Pt's son states that Pt  was found next to   her bed lying down on her back, complaining of b/l  leg pain which has been chronic for the past 2-3 months. She was also noted to have a new Ecchymosis around the L Eye but was awake on their arrival.  Pt is unable to walk well recently secondary to chronic pain. Pt has been having leg swelling for years now, being treated with 3 courses of abx for the past 2 months, last dose of Augmentin x today, been on this for the last 9 days 500mg per day. Pt noted to have heber legs with ulcers and foul-smelling discharge. Pt's appetite otherwise good. Pt denies fever, chills, headache, dizziness, CP, SOB  or any other complaints.  Denies any smoking, alcohol or any substance abuse.     INTERVAL HPI/OVERNIGHT EVENTS:   No acute overnight events.     MEDICATIONS  (STANDING):  aspirin  chewable 81 milliGRAM(s) Oral daily  atorvastatin 40 milliGRAM(s) Oral at bedtime  ceFAZolin   IVPB 500 milliGRAM(s) IV Intermittent every 8 hours  ceFAZolin   IVPB      cholecalciferol 1000 Unit(s) Oral daily  escitalopram 10 milliGRAM(s) Oral daily  furosemide   Injectable 20 milliGRAM(s) IV Push two times a day  heparin   Injectable 5000 Unit(s) SubCutaneous every 8 hours  insulin lispro (ADMELOG) corrective regimen sliding scale   SubCutaneous three times a day before meals  metoprolol succinate ER 12.5 milliGRAM(s) Oral daily  pantoprazole    Tablet 40 milliGRAM(s) Oral before breakfast  senna 2 Tablet(s) Oral at bedtime    MEDICATIONS  (PRN):  acetaminophen   Tablet .. 650 milliGRAM(s) Oral every 6 hours PRN Temp greater or equal to 38C (100.4F), Mild Pain (1 - 3)      REVIEW OF SYSTEMS:  CONSTITUTIONAL: No fever, weight loss, or fatigue  RESPIRATORY: No cough, wheezing, chills or hemoptysis; No shortness of breath  CARDIOVASCULAR: No chest pain, palpitations, dizziness, or leg swelling  GASTROINTESTINAL: No abdominal pain. No nausea, vomiting, or hematemesis; No diarrhea or constipation. No melena or hematochezia.  GENITOURINARY: No dysuria or hematuria, urinary frequency  NEUROLOGICAL: No headaches, memory loss, loss of strength, numbness, or tremors  SKIN: No itching, burning, rashes, or lesions     Vital Signs Last 24 Hrs  T(C): 37.3 (12 Mar 2021 05:29), Max: 37.3 (12 Mar 2021 05:29)  T(F): 99.2 (12 Mar 2021 05:29), Max: 99.2 (12 Mar 2021 05:29)  HR: 85 (12 Mar 2021 05:29) (65 - 85)  BP: 123/59 (12 Mar 2021 05:29) (95/43 - 133/48)  BP(mean): --  RR: 16 (12 Mar 2021 05:29) (16 - 18)  SpO2: 94% (12 Mar 2021 05:29) (94% - 97%)    PHYSICAL EXAMINATION:  GENERAL: NAD, well built  HEAD:  Atraumatic, Normocephalic  EYES:  conjunctiva and sclera clear  NECK: Supple, No JVD, Normal thyroid  CHEST/LUNG: Clear to auscultation. Clear to percussion bilaterally; No rales, rhonchi, wheezing, or rubs  HEART: Regular rate and rhythm; No murmurs, rubs, or gallops  ABDOMEN: Soft, Nontender, Nondistended; Bowel sounds present  NERVOUS SYSTEM:  Alert & Oriented X3,    EXTREMITIES:  BL lower extremity swelling, erythema and wounds (dressed).   SKIN: B/L LE erythema                          10.7   7.52  )-----------( 291      ( 12 Mar 2021 07:31 )             32.7     03-12    142  |  110<H>  |  35<H>  ----------------------------<  93  3.9   |  28  |  1.12    Ca    8.5      12 Mar 2021 07:31  Phos  2.9     03-12  Mg     2.3     03-12    TPro  6.1  /  Alb  2.6<L>  /  TBili  0.4  /  DBili  x   /  AST  32  /  ALT  36  /  AlkPhos  83  03-11    LIVER FUNCTIONS - ( 11 Mar 2021 07:15 )  Alb: 2.6 g/dL / Pro: 6.1 g/dL / ALK PHOS: 83 U/L / ALT: 36 U/L DA / AST: 32 U/L / GGT: x           CARDIAC MARKERS ( 11 Mar 2021 07:15 )  0.024 ng/mL / x     / x     / x     / x      CARDIAC MARKERS ( 10 Mar 2021 17:56 )  0.021 ng/mL / x     / 305 U/L / x     / x          PT/INR - ( 11 Mar 2021 07:15 )   PT: 13.3 sec;   INR: 1.12 ratio         PTT - ( 11 Mar 2021 07:15 )  PTT:29.3 sec    CAPILLARY BLOOD GLUCOSE      POCT Blood Glucose.: 102 mg/dL (12 Mar 2021 08:21)        Culture - Blood (collected 10 Mar 2021 21:47)  Source: .Blood Blood  Preliminary Report (11 Mar 2021 22:01):    No growth to date.    Culture - Blood (collected 10 Mar 2021 21:47)  Source: .Blood Blood  Preliminary Report (11 Mar 2021 22:01):    No growth to date.        RADIOLOGY & ADDITIONAL TESTS:

## 2021-03-12 NOTE — DIETITIAN INITIAL EVALUATION ADULT. - PERTINENT MEDS FT
MEDICATIONS  (STANDING):  aspirin  chewable 81 milliGRAM(s) Oral daily  atorvastatin 40 milliGRAM(s) Oral at bedtime  ceFAZolin   IVPB 500 milliGRAM(s) IV Intermittent every 8 hours  ceFAZolin   IVPB      cholecalciferol 1000 Unit(s) Oral daily  escitalopram 10 milliGRAM(s) Oral daily  furosemide   Injectable 20 milliGRAM(s) IV Push two times a day  heparin   Injectable 5000 Unit(s) SubCutaneous every 8 hours  insulin lispro (ADMELOG) corrective regimen sliding scale   SubCutaneous three times a day before meals  metoprolol succinate ER 12.5 milliGRAM(s) Oral daily  pantoprazole    Tablet 40 milliGRAM(s) Oral before breakfast  senna 2 Tablet(s) Oral at bedtime    MEDICATIONS  (PRN):  acetaminophen   Tablet .. 650 milliGRAM(s) Oral every 6 hours PRN Temp greater or equal to 38C (100.4F), Mild Pain (1 - 3)

## 2021-03-12 NOTE — DIETITIAN INITIAL EVALUATION ADULT. - OTHER INFO
Patient from home lives with son. Visited pt. asleep, contacted son transferred to [facility] presently, d/w RN, reports pt. more confused/agitated today, received meds, sleeping most of day, lunch at bedside & will encourage po intake with feeding assistance once awake, intake at breakfast ~45%, dosing wt. 159.6 Lbs on 03/12 noted, b/l LE swelling and chronic venous ulcers with wound care, Podiatry following, rt./left leg 3+ & rt./left foot 4+ edema noted, on IV/abx. & ID/team following Patient from home lives with son. Visited pt. asleep, contacted son transferred to [facility] presently, d/w RN, reports pt. more confused/agitated today, received meds, sleeping most of the day, lunch at bedside & will encourage po intake with feeding assistance once awake, intake at breakfast ~45%, dosing wt. 159.6 Lbs on 03/12 noted, b/l LE swelling and chronic venous ulcers with wound care, Podiatry following, rt./left leg 3+ & rt./left foot 4+ edema noted, on IV/abx. & ID/team following

## 2021-03-12 NOTE — DIETITIAN INITIAL EVALUATION ADULT. - PROBLEM SELECTOR PLAN 1
Pt admitted for chronic leg pain, swelling and weakness leading to multiple falls in past   Pt had her recent most fall yesterday suffering trauma to her head  Pt has chronic venous ulcers which appear infected with foul smelling discharge  started on iv abx   Symptoms likely due to Venous stasis and less likley to be Cardiac in origin      Blood and wound cultures before antibiotics  PT evaluation   Fall precaution   F/U echo and Venous doppler in am  ID- Dr. Eaton

## 2021-03-12 NOTE — PROGRESS NOTE ADULT - PROBLEM SELECTOR PLAN 1
Pt admitted for chronic leg pain, swelling and weakness leading to multiple falls in past   Pt had her recent most fall yesterday suffering trauma to her head  Pt has chronic venous ulcers which appear infected with foul smelling discharge  Symptoms likely due to Venous stasis and less likley to be Cardiac in origin      Blood and wound cultures before antibiotics  PT evaluation   Fall precaution   Venous doppler negative for DVT  On Cefazolin  ID- Dr. Eaton Pt admitted for chronic leg pain, swelling and weakness leading to multiple falls in past   Pt had her recent most fall yesterday suffering trauma to her head  Pt has chronic venous ulcers which appear infected with foul smelling discharge  Symptoms likely due to Venous stasis and less likley to be Cardiac in origin      Blood cx NGTD  PT: BRIGIDO  Fall precaution   Venous doppler negative for DVT  On Cefazolin  ID- Dr. Eaton

## 2021-03-12 NOTE — DIETITIAN INITIAL EVALUATION ADULT. - FACTORS AFF FOOD INTAKE
weakness, anxiety, h/o Pacemaker/change in mental status/difficulty with food procurement/preparation/other (specify)

## 2021-03-12 NOTE — PROGRESS NOTE ADULT - ASSESSMENT
A:  venous stasis ulcer b/l  cellulitis b/l L > R     P:   Patient evaluated and Chart reviewed   Discussed diagnosis and treatment with patient  Cleaned all wounds with normal saline and applied medi-honey and dry dressing to ulcers  Continue with IV antibiotics   Offloading to bilateral Heels     Continue bilateral  LE elevation      A:  venous stasis ulcer b/l  cellulitis b/l L > R     P:   Patient evaluated and Chart reviewed   Discussed diagnosis and treatment with patient  Cleaned all wounds with normal saline and applied medi-honey and dry dressing to ulcers  Order silvadene cream  Continue daily dressing changes with silvadene cream bilateral lower leg  Continue with IV antibiotics   Offloading to bilateral Heels     Continue bilateral  LE elevation      A:  venous stasis ulcer b/l  cellulitis b/l L > R  VI  PVD  h/o CHF, HTN, Pre-diabetes     P:   Patient evaluated and Chart reviewed   Discussed diagnosis and treatment with patient  Cleaned all wounds with normal saline and applied medi-honey and dry dressing to ulcers with compression dressing  Order silvadene cream  Continue daily dressing changes with silvadene cream bilateral lower leg  Continue with IV antibiotics   Offloading to bilateral Heels     Continue bilateral  LE elevation

## 2021-03-12 NOTE — PROGRESS NOTE ADULT - PROBLEM SELECTOR PLAN 2
Pt noted to have multiple episodes of Mechanical fall since past 2-3 months  Last fall being yesterday with head trauma    CT head - no acute changes   f/u orthostatics, ECHO   PT: Rehab Pt noted to have multiple episodes of Mechanical fall since past 2-3 months  Last fall being 2/10 with head trauma    CT head - no acute changes   f/u ECHO   PT: Rehab

## 2021-03-12 NOTE — DIETITIAN INITIAL EVALUATION ADULT. - PROBLEM SELECTOR PLAN 2
Pt noted to have multiple episodes of Mechanical fall since past 2-3 months  Last fall being yesterday with head trauma    CT head - no acute changes   f/u orthostatics, ECHO and Carotid doppler   f/u PT

## 2021-03-13 LAB
ANION GAP SERPL CALC-SCNC: 6 MMOL/L — SIGNIFICANT CHANGE UP (ref 5–17)
BUN SERPL-MCNC: 25 MG/DL — HIGH (ref 7–18)
CALCIUM SERPL-MCNC: 8.7 MG/DL — SIGNIFICANT CHANGE UP (ref 8.4–10.5)
CHLORIDE SERPL-SCNC: 109 MMOL/L — HIGH (ref 96–108)
CO2 SERPL-SCNC: 29 MMOL/L — SIGNIFICANT CHANGE UP (ref 22–31)
CREAT SERPL-MCNC: 0.98 MG/DL — SIGNIFICANT CHANGE UP (ref 0.5–1.3)
GLUCOSE BLDC GLUCOMTR-MCNC: 107 MG/DL — HIGH (ref 70–99)
GLUCOSE BLDC GLUCOMTR-MCNC: 156 MG/DL — HIGH (ref 70–99)
GLUCOSE BLDC GLUCOMTR-MCNC: 91 MG/DL — SIGNIFICANT CHANGE UP (ref 70–99)
GLUCOSE BLDC GLUCOMTR-MCNC: 99 MG/DL — SIGNIFICANT CHANGE UP (ref 70–99)
GLUCOSE SERPL-MCNC: 96 MG/DL — SIGNIFICANT CHANGE UP (ref 70–99)
HCT VFR BLD CALC: 35.6 % — SIGNIFICANT CHANGE UP (ref 34.5–45)
HGB BLD-MCNC: 11.5 G/DL — SIGNIFICANT CHANGE UP (ref 11.5–15.5)
MAGNESIUM SERPL-MCNC: 2.2 MG/DL — SIGNIFICANT CHANGE UP (ref 1.6–2.6)
MCHC RBC-ENTMCNC: 29 PG — SIGNIFICANT CHANGE UP (ref 27–34)
MCHC RBC-ENTMCNC: 32.3 GM/DL — SIGNIFICANT CHANGE UP (ref 32–36)
MCV RBC AUTO: 89.9 FL — SIGNIFICANT CHANGE UP (ref 80–100)
NRBC # BLD: 0 /100 WBCS — SIGNIFICANT CHANGE UP (ref 0–0)
PHOSPHATE SERPL-MCNC: 2.9 MG/DL — SIGNIFICANT CHANGE UP (ref 2.5–4.5)
PLATELET # BLD AUTO: 284 K/UL — SIGNIFICANT CHANGE UP (ref 150–400)
POTASSIUM SERPL-MCNC: 3.7 MMOL/L — SIGNIFICANT CHANGE UP (ref 3.5–5.3)
POTASSIUM SERPL-SCNC: 3.7 MMOL/L — SIGNIFICANT CHANGE UP (ref 3.5–5.3)
RBC # BLD: 3.96 M/UL — SIGNIFICANT CHANGE UP (ref 3.8–5.2)
RBC # FLD: 14.6 % — HIGH (ref 10.3–14.5)
SODIUM SERPL-SCNC: 144 MMOL/L — SIGNIFICANT CHANGE UP (ref 135–145)
WBC # BLD: 6.99 K/UL — SIGNIFICANT CHANGE UP (ref 3.8–10.5)
WBC # FLD AUTO: 6.99 K/UL — SIGNIFICANT CHANGE UP (ref 3.8–10.5)

## 2021-03-13 PROCEDURE — 99232 SBSQ HOSP IP/OBS MODERATE 35: CPT | Mod: GC

## 2021-03-13 RX ADMIN — Medication 12.5 MILLIGRAM(S): at 05:34

## 2021-03-13 RX ADMIN — Medication 81 MILLIGRAM(S): at 11:27

## 2021-03-13 RX ADMIN — HEPARIN SODIUM 5000 UNIT(S): 5000 INJECTION INTRAVENOUS; SUBCUTANEOUS at 05:35

## 2021-03-13 RX ADMIN — Medication 1: at 12:11

## 2021-03-13 RX ADMIN — ATORVASTATIN CALCIUM 40 MILLIGRAM(S): 80 TABLET, FILM COATED ORAL at 21:08

## 2021-03-13 RX ADMIN — Medication 1000 UNIT(S): at 11:27

## 2021-03-13 RX ADMIN — SENNA PLUS 2 TABLET(S): 8.6 TABLET ORAL at 21:08

## 2021-03-13 RX ADMIN — Medication 100 MILLIGRAM(S): at 05:35

## 2021-03-13 RX ADMIN — Medication 20 MILLIGRAM(S): at 17:23

## 2021-03-13 RX ADMIN — HEPARIN SODIUM 5000 UNIT(S): 5000 INJECTION INTRAVENOUS; SUBCUTANEOUS at 13:34

## 2021-03-13 RX ADMIN — HEPARIN SODIUM 5000 UNIT(S): 5000 INJECTION INTRAVENOUS; SUBCUTANEOUS at 21:08

## 2021-03-13 RX ADMIN — Medication 20 MILLIGRAM(S): at 05:35

## 2021-03-13 RX ADMIN — Medication 100 MILLIGRAM(S): at 13:34

## 2021-03-13 RX ADMIN — Medication 100 MILLIGRAM(S): at 21:08

## 2021-03-13 RX ADMIN — ESCITALOPRAM OXALATE 10 MILLIGRAM(S): 10 TABLET, FILM COATED ORAL at 11:26

## 2021-03-13 RX ADMIN — Medication 1 APPLICATION(S): at 11:27

## 2021-03-13 RX ADMIN — PANTOPRAZOLE SODIUM 40 MILLIGRAM(S): 20 TABLET, DELAYED RELEASE ORAL at 05:37

## 2021-03-13 NOTE — PROGRESS NOTE ADULT - PROBLEM SELECTOR PLAN 2
Pt noted to have multiple episodes of Mechanical fall since past 2-3 months  Last fall being 2/10 with head trauma    CT head - no acute changes   f/u ECHO   PT: Rehab

## 2021-03-13 NOTE — PROGRESS NOTE ADULT - PROBLEM SELECTOR PLAN 1
Pt admitted for chronic leg pain, swelling and weakness leading to multiple falls in past   Pt had her recent most fall yesterday suffering trauma to her head  Pt has chronic venous ulcers which appear infected with foul smelling discharge  Symptoms likely due to Venous stasis and less likley to be Cardiac in origin      Blood cx NGTD  PT: BRIGIDO  Fall precaution   Venous doppler negative for DVT  On Cefazolin  ID- Dr. Eaton

## 2021-03-13 NOTE — PROGRESS NOTE ADULT - SUBJECTIVE AND OBJECTIVE BOX
PGY-1 Progress Note discussed with attending    PAGER #: [[phone]] TILL 5:00 PM  PLEASE CONTACT ON CALL TEAM:  - On Call Team (Please refer to Emma) FROM 5:00 PM - 8:30PM  - Nightfloat Team FROM 8:30 -7:30 AM    CHIEF COMPLAINT & BRIEF HOSPITAL COURSE:  89 female, from home, lives with Son and daughter in law,  with PMHx of Dementia,  HTN, CHF( last echo Dec 2019 EF 40-45% g1dd) , Anxiety,  Chronic Venous ulcers,  prediabetes presents to the ED c/o BLE pain/swelling x 3 months. Pt with intermittent confusion for the past 3 months, AAOX2 ( Baseline). All collateral information was obtained from ED physician who  also spoke to the  son Mikhail ([phone]).  Son was not reachable the same number to me later. As per ED physician,  Son states that  Pt has been having frequent falls in the past 2-3 months, last fall  occurring this morning at 4AM, unwitnessed. Pt's son states that Pt  was found next to   her bed lying down on her back, complaining of b/l  leg pain which has been chronic for the past 2-3 months. She was also noted to have a new Ecchymosis around the L Eye but was awake on their arrival.  Pt is unable to walk well recently secondary to chronic pain. Pt has been having leg swelling for years now, being treated with 3 courses of abx for the past 2 months, last dose of Augmentin x today, been on this for the last 9 days 500mg per day. Pt noted to have heber legs with ulcers and foul-smelling discharge. Pt's appetite otherwise good. Pt denies fever, chills, headache, dizziness, CP, SOB  or any other complaints.  Denies any smoking, alcohol or any substance abuse.     INTERVAL HPI/OVERNIGHT EVENTS:       MEDICATIONS  (STANDING):  aspirin  chewable 81 milliGRAM(s) Oral daily  atorvastatin 40 milliGRAM(s) Oral at bedtime  ceFAZolin   IVPB 500 milliGRAM(s) IV Intermittent every 8 hours  ceFAZolin   IVPB      cholecalciferol 1000 Unit(s) Oral daily  escitalopram 10 milliGRAM(s) Oral daily  furosemide   Injectable 20 milliGRAM(s) IV Push two times a day  heparin   Injectable 5000 Unit(s) SubCutaneous every 8 hours  insulin lispro (ADMELOG) corrective regimen sliding scale   SubCutaneous three times a day before meals  metoprolol succinate ER 12.5 milliGRAM(s) Oral daily  pantoprazole    Tablet 40 milliGRAM(s) Oral before breakfast  senna 2 Tablet(s) Oral at bedtime  silver sulfADIAZINE 1% Cream 1 Application(s) Topical daily    MEDICATIONS  (PRN):  acetaminophen   Tablet .. 650 milliGRAM(s) Oral every 6 hours PRN Temp greater or equal to 38C (100.4F), Mild Pain (1 - 3)      REVIEW OF SYSTEMS:  CONSTITUTIONAL: No fever, weight loss, or fatigue  RESPIRATORY: No cough, wheezing, chills or hemoptysis; No shortness of breath  CARDIOVASCULAR: No chest pain, palpitations, dizziness, or leg swelling  GASTROINTESTINAL: No abdominal pain. No nausea, vomiting, or hematemesis; No diarrhea or constipation. No melena or hematochezia.  GENITOURINARY: No dysuria or hematuria, urinary frequency  NEUROLOGICAL: No headaches, memory loss, loss of strength, numbness, or tremors  SKIN: No itching, burning, rashes, or lesions     Vital Signs Last 24 Hrs  T(C): 36.8 (13 Mar 2021 14:17), Max: 36.9 (12 Mar 2021 21:21)  T(F): 98.3 (13 Mar 2021 14:17), Max: 98.4 (12 Mar 2021 21:21)  HR: 68 (13 Mar 2021 14:17) (68 - 83)  BP: 117/60 (13 Mar 2021 14:17) (100/51 - 130/67)  BP(mean): --  RR: 18 (13 Mar 2021 14:17) (17 - 18)  SpO2: 95% (13 Mar 2021 14:17) (93% - 95%)    PHYSICAL EXAMINATION:  GENERAL: NAD, well built  HEAD:  Atraumatic, Normocephalic  EYES:  conjunctiva and sclera clear  NECK: Supple, No JVD, Normal thyroid  CHEST/LUNG: Clear to auscultation. Clear to percussion bilaterally; No rales, rhonchi, wheezing, or rubs  HEART: Regular rate and rhythm; No murmurs, rubs, or gallops  ABDOMEN: Soft, Nontender, Nondistended; Bowel sounds present  NERVOUS SYSTEM:  Alert & Oriented X3,    EXTREMITIES:  2+ Peripheral Pulses, No clubbing, cyanosis, or edema  SKIN: warm dry                          11.5   6.99  )-----------( 284      ( 13 Mar 2021 07:47 )             35.6     03-13    144  |  109<H>  |  25<H>  ----------------------------<  96  3.7   |  29  |  0.98    Ca    8.7      13 Mar 2021 07:47  Phos  2.9     03-13  Mg     2.2     03-13                CAPILLARY BLOOD GLUCOSE      POCT Blood Glucose.: 156 mg/dL (13 Mar 2021 11:33)        Culture - Blood (collected 10 Mar 2021 21:47)  Source: .Blood Blood  Preliminary Report (11 Mar 2021 22:01):    No growth to date.    Culture - Blood (collected 10 Mar 2021 21:47)  Source: .Blood Blood  Preliminary Report (11 Mar 2021 22:01):    No growth to date.        RADIOLOGY & ADDITIONAL TESTS:                   PGY-1 Progress Note discussed with attending    PAGER #: [[phone]] TILL 5:00 PM  PLEASE CONTACT ON CALL TEAM:  - On Call Team (Please refer to Emma) FROM 5:00 PM - 8:30PM  - Nightfloat Team FROM 8:30 -7:30 AM    CHIEF COMPLAINT & BRIEF HOSPITAL COURSE:  89 female, from home, lives with Son and daughter in law,  with PMHx of Dementia,  HTN, CHF( last echo Dec 2019 EF 40-45% g1dd) , Anxiety,  Chronic Venous ulcers,  prediabetes presents to the ED c/o BLE pain/swelling x 3 months. Pt with intermittent confusion for the past 3 months, AAOX2 ( Baseline). All collateral information was obtained from ED physician who  also spoke to the  son Mikhail ([phone]).  Son was not reachable the same number to me later. As per ED physician,  Son states that  Pt has been having frequent falls in the past 2-3 months, last fall  occurring this morning at 4AM, unwitnessed. Pt's son states that Pt  was found next to   her bed lying down on her back, complaining of b/l  leg pain which has been chronic for the past 2-3 months. She was also noted to have a new Ecchymosis around the L Eye but was awake on their arrival.  Pt is unable to walk well recently secondary to chronic pain. Pt has been having leg swelling for years now, being treated with 3 courses of abx for the past 2 months, last dose of Augmentin x today, been on this for the last 9 days 500mg per day. Pt noted to have heber legs with ulcers and foul-smelling discharge. Pt's appetite otherwise good. Pt denies fever, chills, headache, dizziness, CP, SOB  or any other complaints.  Denies any smoking, alcohol or any substance abuse.     INTERVAL HPI/OVERNIGHT EVENTS:   No acute overnight events    MEDICATIONS  (STANDING):  aspirin  chewable 81 milliGRAM(s) Oral daily  atorvastatin 40 milliGRAM(s) Oral at bedtime  ceFAZolin   IVPB 500 milliGRAM(s) IV Intermittent every 8 hours  ceFAZolin   IVPB      cholecalciferol 1000 Unit(s) Oral daily  escitalopram 10 milliGRAM(s) Oral daily  furosemide   Injectable 20 milliGRAM(s) IV Push two times a day  heparin   Injectable 5000 Unit(s) SubCutaneous every 8 hours  insulin lispro (ADMELOG) corrective regimen sliding scale   SubCutaneous three times a day before meals  metoprolol succinate ER 12.5 milliGRAM(s) Oral daily  pantoprazole    Tablet 40 milliGRAM(s) Oral before breakfast  senna 2 Tablet(s) Oral at bedtime  silver sulfADIAZINE 1% Cream 1 Application(s) Topical daily    MEDICATIONS  (PRN):  acetaminophen   Tablet .. 650 milliGRAM(s) Oral every 6 hours PRN Temp greater or equal to 38C (100.4F), Mild Pain (1 - 3)      REVIEW OF SYSTEMS:  CONSTITUTIONAL: No fever, weight loss, or fatigue  RESPIRATORY: No cough, wheezing, chills or hemoptysis; No shortness of breath  CARDIOVASCULAR: No chest pain, palpitations, dizziness, or leg swelling  GASTROINTESTINAL: No abdominal pain. No nausea, vomiting, or hematemesis; No diarrhea or constipation. No melena or hematochezia.  GENITOURINARY: No dysuria or hematuria, urinary frequency  NEUROLOGICAL: No headaches, memory loss, loss of strength, numbness, or tremors  SKIN: No itching, burning, rashes, or lesions     Vital Signs Last 24 Hrs  T(C): 36.8 (13 Mar 2021 14:17), Max: 36.9 (12 Mar 2021 21:21)  T(F): 98.3 (13 Mar 2021 14:17), Max: 98.4 (12 Mar 2021 21:21)  HR: 68 (13 Mar 2021 14:17) (68 - 83)  BP: 117/60 (13 Mar 2021 14:17) (100/51 - 130/67)  BP(mean): --  RR: 18 (13 Mar 2021 14:17) (17 - 18)  SpO2: 95% (13 Mar 2021 14:17) (93% - 95%)    PHYSICAL EXAMINATION:  GENERAL: NAD, well built  HEAD:  Atraumatic, Normocephalic  EYES:  conjunctiva and sclera clear  NECK: Supple, No JVD, Normal thyroid  CHEST/LUNG: Clear to auscultation. Clear to percussion bilaterally; No rales, rhonchi, wheezing, or rubs  HEART: Regular rate and rhythm; No murmurs, rubs, or gallops  ABDOMEN: Soft, Nontender, Nondistended; Bowel sounds present  NERVOUS SYSTEM:  Alert & Oriented X3,    EXTREMITIES:  Bl LE dressed in bandage.   SKIN: warm dry                          11.5   6.99  )-----------( 284      ( 13 Mar 2021 07:47 )             35.6     03-13    144  |  109<H>  |  25<H>  ----------------------------<  96  3.7   |  29  |  0.98    Ca    8.7      13 Mar 2021 07:47  Phos  2.9     03-13  Mg     2.2     03-13                CAPILLARY BLOOD GLUCOSE      POCT Blood Glucose.: 156 mg/dL (13 Mar 2021 11:33)        Culture - Blood (collected 10 Mar 2021 21:47)  Source: .Blood Blood  Preliminary Report (11 Mar 2021 22:01):    No growth to date.    Culture - Blood (collected 10 Mar 2021 21:47)  Source: .Blood Blood  Preliminary Report (11 Mar 2021 22:01):    No growth to date.        RADIOLOGY & ADDITIONAL TESTS:

## 2021-03-14 LAB
ANION GAP SERPL CALC-SCNC: 9 MMOL/L — SIGNIFICANT CHANGE UP (ref 5–17)
BUN SERPL-MCNC: 21 MG/DL — HIGH (ref 7–18)
CALCIUM SERPL-MCNC: 8.6 MG/DL — SIGNIFICANT CHANGE UP (ref 8.4–10.5)
CHLORIDE SERPL-SCNC: 104 MMOL/L — SIGNIFICANT CHANGE UP (ref 96–108)
CO2 SERPL-SCNC: 28 MMOL/L — SIGNIFICANT CHANGE UP (ref 22–31)
CREAT SERPL-MCNC: 0.94 MG/DL — SIGNIFICANT CHANGE UP (ref 0.5–1.3)
GLUCOSE BLDC GLUCOMTR-MCNC: 106 MG/DL — HIGH (ref 70–99)
GLUCOSE BLDC GLUCOMTR-MCNC: 107 MG/DL — HIGH (ref 70–99)
GLUCOSE BLDC GLUCOMTR-MCNC: 113 MG/DL — HIGH (ref 70–99)
GLUCOSE BLDC GLUCOMTR-MCNC: 117 MG/DL — HIGH (ref 70–99)
GLUCOSE SERPL-MCNC: 96 MG/DL — SIGNIFICANT CHANGE UP (ref 70–99)
HCT VFR BLD CALC: 35.1 % — SIGNIFICANT CHANGE UP (ref 34.5–45)
HGB BLD-MCNC: 11.3 G/DL — LOW (ref 11.5–15.5)
MAGNESIUM SERPL-MCNC: 2.2 MG/DL — SIGNIFICANT CHANGE UP (ref 1.6–2.6)
MCHC RBC-ENTMCNC: 28.5 PG — SIGNIFICANT CHANGE UP (ref 27–34)
MCHC RBC-ENTMCNC: 32.2 GM/DL — SIGNIFICANT CHANGE UP (ref 32–36)
MCV RBC AUTO: 88.6 FL — SIGNIFICANT CHANGE UP (ref 80–100)
NRBC # BLD: 0 /100 WBCS — SIGNIFICANT CHANGE UP (ref 0–0)
PHOSPHATE SERPL-MCNC: 3 MG/DL — SIGNIFICANT CHANGE UP (ref 2.5–4.5)
PLATELET # BLD AUTO: 279 K/UL — SIGNIFICANT CHANGE UP (ref 150–400)
POTASSIUM SERPL-MCNC: 3.6 MMOL/L — SIGNIFICANT CHANGE UP (ref 3.5–5.3)
POTASSIUM SERPL-SCNC: 3.6 MMOL/L — SIGNIFICANT CHANGE UP (ref 3.5–5.3)
RBC # BLD: 3.96 M/UL — SIGNIFICANT CHANGE UP (ref 3.8–5.2)
RBC # FLD: 14.5 % — SIGNIFICANT CHANGE UP (ref 10.3–14.5)
SARS-COV-2 RNA SPEC QL NAA+PROBE: SIGNIFICANT CHANGE UP
SODIUM SERPL-SCNC: 141 MMOL/L — SIGNIFICANT CHANGE UP (ref 135–145)
WBC # BLD: 6.36 K/UL — SIGNIFICANT CHANGE UP (ref 3.8–10.5)
WBC # FLD AUTO: 6.36 K/UL — SIGNIFICANT CHANGE UP (ref 3.8–10.5)

## 2021-03-14 PROCEDURE — 99232 SBSQ HOSP IP/OBS MODERATE 35: CPT

## 2021-03-14 RX ADMIN — HEPARIN SODIUM 5000 UNIT(S): 5000 INJECTION INTRAVENOUS; SUBCUTANEOUS at 05:42

## 2021-03-14 RX ADMIN — Medication 1 APPLICATION(S): at 12:18

## 2021-03-14 RX ADMIN — PANTOPRAZOLE SODIUM 40 MILLIGRAM(S): 20 TABLET, DELAYED RELEASE ORAL at 05:42

## 2021-03-14 RX ADMIN — HEPARIN SODIUM 5000 UNIT(S): 5000 INJECTION INTRAVENOUS; SUBCUTANEOUS at 13:06

## 2021-03-14 RX ADMIN — Medication 12.5 MILLIGRAM(S): at 05:41

## 2021-03-14 RX ADMIN — ESCITALOPRAM OXALATE 10 MILLIGRAM(S): 10 TABLET, FILM COATED ORAL at 12:18

## 2021-03-14 RX ADMIN — SENNA PLUS 2 TABLET(S): 8.6 TABLET ORAL at 21:18

## 2021-03-14 RX ADMIN — Medication 100 MILLIGRAM(S): at 13:05

## 2021-03-14 RX ADMIN — Medication 81 MILLIGRAM(S): at 12:17

## 2021-03-14 RX ADMIN — Medication 20 MILLIGRAM(S): at 17:11

## 2021-03-14 RX ADMIN — Medication 100 MILLIGRAM(S): at 05:40

## 2021-03-14 RX ADMIN — Medication 1000 UNIT(S): at 12:18

## 2021-03-14 RX ADMIN — Medication 100 MILLIGRAM(S): at 21:18

## 2021-03-14 RX ADMIN — Medication 20 MILLIGRAM(S): at 05:42

## 2021-03-14 RX ADMIN — HEPARIN SODIUM 5000 UNIT(S): 5000 INJECTION INTRAVENOUS; SUBCUTANEOUS at 21:18

## 2021-03-14 RX ADMIN — ATORVASTATIN CALCIUM 40 MILLIGRAM(S): 80 TABLET, FILM COATED ORAL at 21:18

## 2021-03-14 NOTE — PROGRESS NOTE ADULT - PROBLEM SELECTOR PLAN 4
Pt has PMH of HFrEF  ( last echo Dec 2019 EF 40-45% g1dd  F/U strict i/o, daily weight   f/u ECHO  started on lasix and Metoprolol

## 2021-03-14 NOTE — PROGRESS NOTE ADULT - PROBLEM SELECTOR PROBLEM 3
DANISH (acute kidney injury)

## 2021-03-14 NOTE — PROGRESS NOTE ADULT - PROBLEM/PLAN-9
Subjective   Marcia Shi is a 45 y.o. female.   Chief Complaint   Patient presents with   • Depression   • Allergic Rhinitis       History of Present Illness     #1 Depression with anxiety/ #2 Insomnia-on Celexa 40 mg a day.  Patient takes it everyday.  She has no side effects.  Mood is decreased.  She worries about her mom.  Mom is going through the diagnostic process, there is concern of uterine cancer.  Patient has problems with sleeping.  She cannot fall asleep for long time.  She worries.  She had trazodone at home that we prescribed in the past and she took it.  It helped with sleeping.  She was able to get 8 hours of sleep.  No suicidal ideations, no aggressive behaviors.  PHQ 9 at 11 NICKY 7 at 6.    We attempted weaning off citalopram 2 times in the last 3 years. Patient was not able to function.     #3 AR- patient is on Flonase and Zyrtec.  She takes it everyday as prescribed.  She reports no side effects.  No nosebleeds.  Symptoms are controlled.     Prior to treatment of allergies she used to have at least 2-3 sinus infections a year.     The following portions of the patient's history were reviewed and updated as appropriate: allergies, current medications, past family history, past medical history, past social history and problem list.    Review of Systems   Constitutional: Negative.    Cardiovascular: Negative.    Psychiatric/Behavioral: Negative for suicidal ideas. The patient is nervous/anxious.          Objective   Wt Readings from Last 3 Encounters:   08/28/19 109 kg (240 lb)   02/08/19 104 kg (230 lb)   11/27/18 104 kg (229 lb)      Vitals:    08/28/19 1500   BP: 110/60   Pulse: 82   Temp: 98.4 °F (36.9 °C)   SpO2: 97%     Temp Readings from Last 3 Encounters:   08/28/19 98.4 °F (36.9 °C)   02/08/19 98 °F (36.7 °C)   11/19/18 98.3 °F (36.8 °C)     BP Readings from Last 3 Encounters:   08/28/19 110/60   02/08/19 116/80   11/27/18 126/75     Pulse Readings from Last 3 Encounters:   08/28/19 82 
  02/08/19 80   11/27/18 90       Physical Exam   Constitutional: She is oriented to person, place, and time. She appears well-developed and well-nourished.   HENT:   Head: Normocephalic and atraumatic.   Neck: Neck supple. Carotid bruit is not present. No thyromegaly present.   Cardiovascular: Normal rate, regular rhythm and normal heart sounds.   Pulmonary/Chest: Effort normal and breath sounds normal.   Neurological: She is alert and oriented to person, place, and time.   Skin: Skin is warm, dry and intact.   Psychiatric: She has a normal mood and affect. Her behavior is normal.       Assessment/Plan   Marcia was seen today for depression, insomnia and allergic rhinitis.    Diagnoses and all orders for this visit:    Anxiety and depression    Other insomnia    Non-seasonal allergic rhinitis, unspecified trigger    Other orders  -     citalopram (CeleXA) 40 MG tablet; Take 1 tablet by mouth Daily.  -     traZODone (DESYREL) 50 MG tablet; Take 1 tablet by mouth Every Night.  -     montelukast (SINGULAIR) 10 MG tablet; Take 1 tablet by mouth Every Night.  -     fluticasone (FLONASE) 50 MCG/ACT nasal spray; 2 sprays into the nostril(s) as directed by provider Daily.        #1 Depression/anxiety-worsening.  Patient is on a maximum dose of citalopram.  We will continue it for now.  Follow-up in 6 months, or sooner if worsening of symptoms or any other concerns.    2.  Insomnia- we will continue trazodone at 50 mg at bedtime.  Follow-up in 6 months, or sooner if problems.    3.  Allergic rhinitis-continue same.  Follow-up in 6-12 months.       
DISPLAY PLAN FREE TEXT

## 2021-03-14 NOTE — PROGRESS NOTE ADULT - ASSESSMENT
89 female, from home, lives with Son and daughter in law,  with PMHx of Dementia,  HTN, chronic HFrEF( last echo Dec 2019 EF 40-45% g1dd) , Anxiety,  prediabetes presents to the ED c/o BLE pain/swelling x 3 months. Pt admitted for Chronic leg pain and swelling with Multiple falls and supraimposed cellulitis.

## 2021-03-14 NOTE — PROGRESS NOTE ADULT - SUBJECTIVE AND OBJECTIVE BOX
[facility] Medicine    Patient is a 89y old  Female who presents with a chief complaint of Multiple falls at home (13 Mar 2021 15:47)      SUBJECTIVE / OVERNIGHT EVENTS: No acute events overnight. Says she feels well and denies any pain in the legs. Asking for discharge soon.     MEDICATIONS  (STANDING):  aspirin  chewable 81 milliGRAM(s) Oral daily  atorvastatin 40 milliGRAM(s) Oral at bedtime  ceFAZolin   IVPB 500 milliGRAM(s) IV Intermittent every 8 hours  ceFAZolin   IVPB      cholecalciferol 1000 Unit(s) Oral daily  escitalopram 10 milliGRAM(s) Oral daily  furosemide   Injectable 20 milliGRAM(s) IV Push two times a day  heparin   Injectable 5000 Unit(s) SubCutaneous every 8 hours  insulin lispro (ADMELOG) corrective regimen sliding scale   SubCutaneous three times a day before meals  metoprolol succinate ER 12.5 milliGRAM(s) Oral daily  pantoprazole    Tablet 40 milliGRAM(s) Oral before breakfast  senna 2 Tablet(s) Oral at bedtime  silver sulfADIAZINE 1% Cream 1 Application(s) Topical daily    MEDICATIONS  (PRN):  acetaminophen   Tablet .. 650 milliGRAM(s) Oral every 6 hours PRN Temp greater or equal to 38C (100.4F), Mild Pain (1 - 3)      Vital Signs Last 24 Hrs  T(C): 37.6 (03-14-21 @ 20:16)  T(F): 99.7 (03-14-21 @ 20:16), Max: 99.7 (03-14-21 @ 20:16)  HR: 76 (03-14-21 @ 20:16) (64 - 76)  BP: 133/59 (03-14-21 @ 20:16)  BP(mean): --  RR: 18 (03-14-21 @ 20:16) (18 - 18)  SpO2: 98% (03-14-21 @ 20:16) (96% - 98%)  Wt(kg): --    CAPILLARY BLOOD GLUCOSE      POCT Blood Glucose.: 113 mg/dL (14 Mar 2021 16:56)  POCT Blood Glucose.: 107 mg/dL (14 Mar 2021 11:22)  POCT Blood Glucose.: 117 mg/dL (14 Mar 2021 07:51)    I&O's Summary      PHYSICAL EXAM:  GENERAL: NAD, well-developed  HEAD:  Atraumatic, Normocephalic  EYES: EOMI, PERRLA, conjunctiva and sclera clear  NECK: Supple, No JVD  CHEST/LUNG: Clear to auscultation bilaterally; No wheeze  HEART: Regular rate and rhythm; No murmurs, rubs, or gallops  ABDOMEN: Soft, Nontender, Nondistended; Bowel sounds present  EXTREMITIES:  2+ Peripheral Pulses, No clubbing, cyanosis, or edema  PSYCH: AAOx3  NEUROLOGY: non-focal  SKIN: No rashes or lesions    LABS:                        11.3   6.36  )-----------( 279      ( 14 Mar 2021 08:22 )             35.1     03-14    141  |  104  |  21<H>  ----------------------------<  96  3.6   |  28  |  0.94    Ca    8.6      14 Mar 2021 08:22  Phos  3.0     03-14  Mg     2.2     03-14                RADIOLOGY & ADDITIONAL TESTS:    Imaging Personally Reviewed:    Consultant(s) Notes Reviewed:      Care Discussed with Consultants/Other Providers:    Assessment and Plan: [facility] Medicine    Patient is a 89y old  Female who presents with a chief complaint of Multiple falls at home (13 Mar 2021 15:47)      SUBJECTIVE / OVERNIGHT EVENTS: No acute events overnight. Says she feels well and denies any pain in the legs. Asking for discharge soon.     MEDICATIONS  (STANDING):  aspirin  chewable 81 milliGRAM(s) Oral daily  atorvastatin 40 milliGRAM(s) Oral at bedtime  ceFAZolin   IVPB 500 milliGRAM(s) IV Intermittent every 8 hours  ceFAZolin   IVPB      cholecalciferol 1000 Unit(s) Oral daily  escitalopram 10 milliGRAM(s) Oral daily  furosemide   Injectable 20 milliGRAM(s) IV Push two times a day  heparin   Injectable 5000 Unit(s) SubCutaneous every 8 hours  insulin lispro (ADMELOG) corrective regimen sliding scale   SubCutaneous three times a day before meals  metoprolol succinate ER 12.5 milliGRAM(s) Oral daily  pantoprazole    Tablet 40 milliGRAM(s) Oral before breakfast  senna 2 Tablet(s) Oral at bedtime  silver sulfADIAZINE 1% Cream 1 Application(s) Topical daily    MEDICATIONS  (PRN):  acetaminophen   Tablet .. 650 milliGRAM(s) Oral every 6 hours PRN Temp greater or equal to 38C (100.4F), Mild Pain (1 - 3)      Vital Signs Last 24 Hrs  T(C): 37.6 (03-14-21 @ 20:16)  T(F): 99.7 (03-14-21 @ 20:16), Max: 99.7 (03-14-21 @ 20:16)  HR: 76 (03-14-21 @ 20:16) (64 - 76)  BP: 133/59 (03-14-21 @ 20:16)  BP(mean): --  RR: 18 (03-14-21 @ 20:16) (18 - 18)  SpO2: 98% (03-14-21 @ 20:16) (96% - 98%)  Wt(kg): --    CAPILLARY BLOOD GLUCOSE      POCT Blood Glucose.: 113 mg/dL (14 Mar 2021 16:56)  POCT Blood Glucose.: 107 mg/dL (14 Mar 2021 11:22)  POCT Blood Glucose.: 117 mg/dL (14 Mar 2021 07:51)    I&O's Summary    PHYSICAL EXAM  GENERAL: NAD, well built  HEAD:  Atraumatic, Normocephalic  EYES:  conjunctiva and sclera clear  NECK: Supple, No JVD, Normal thyroid  CHEST/LUNG: Clear to auscultation. Clear to percussion bilaterally; No rales, rhonchi, wheezing, or rubs  HEART: Regular rate and rhythm; No murmurs, rubs, or gallops  ABDOMEN: Soft, Nontender, Nondistended; Bowel sounds present  NERVOUS SYSTEM:  Alert & Oriented X3,    EXTREMITIES:  Bl LE dressed in bandage, wrinkling of skin  SKIN: warm dry, hirsutism of face    LABS:                        11.3   6.36  )-----------( 279      ( 14 Mar 2021 08:22 )             35.1     03-14    141  |  104  |  21<H>  ----------------------------<  96  3.6   |  28  |  0.94    Ca    8.6      14 Mar 2021 08:22  Phos  3.0     03-14  Mg     2.2     03-14                RADIOLOGY & ADDITIONAL TESTS:    Imaging Personally Reviewed:    Consultant(s) Notes Reviewed:      Care Discussed with Consultants/Other Providers:    Assessment and Plan:

## 2021-03-14 NOTE — PROGRESS NOTE ADULT - NSHPATTENDINGPLANDISCUSS_GEN_ALL_CORE
Patient
Patient, Housestaff, Staff

## 2021-03-15 ENCOUNTER — TRANSCRIPTION ENCOUNTER (OUTPATIENT)
Age: 86
End: 2021-03-15

## 2021-03-15 VITALS
RESPIRATION RATE: 17 BRPM | HEART RATE: 71 BPM | TEMPERATURE: 99 F | SYSTOLIC BLOOD PRESSURE: 103 MMHG | OXYGEN SATURATION: 97 % | DIASTOLIC BLOOD PRESSURE: 63 MMHG

## 2021-03-15 LAB
ANION GAP SERPL CALC-SCNC: 6 MMOL/L — SIGNIFICANT CHANGE UP (ref 5–17)
BUN SERPL-MCNC: 22 MG/DL — HIGH (ref 7–18)
CALCIUM SERPL-MCNC: 8.7 MG/DL — SIGNIFICANT CHANGE UP (ref 8.4–10.5)
CHLORIDE SERPL-SCNC: 104 MMOL/L — SIGNIFICANT CHANGE UP (ref 96–108)
CO2 SERPL-SCNC: 30 MMOL/L — SIGNIFICANT CHANGE UP (ref 22–31)
CREAT SERPL-MCNC: 1.04 MG/DL — SIGNIFICANT CHANGE UP (ref 0.5–1.3)
CULTURE RESULTS: SIGNIFICANT CHANGE UP
CULTURE RESULTS: SIGNIFICANT CHANGE UP
GLUCOSE BLDC GLUCOMTR-MCNC: 107 MG/DL — HIGH (ref 70–99)
GLUCOSE BLDC GLUCOMTR-MCNC: 139 MG/DL — HIGH (ref 70–99)
GLUCOSE BLDC GLUCOMTR-MCNC: 97 MG/DL — SIGNIFICANT CHANGE UP (ref 70–99)
GLUCOSE SERPL-MCNC: 103 MG/DL — HIGH (ref 70–99)
HCT VFR BLD CALC: 36.5 % — SIGNIFICANT CHANGE UP (ref 34.5–45)
HGB BLD-MCNC: 12 G/DL — SIGNIFICANT CHANGE UP (ref 11.5–15.5)
MAGNESIUM SERPL-MCNC: 2.1 MG/DL — SIGNIFICANT CHANGE UP (ref 1.6–2.6)
MCHC RBC-ENTMCNC: 29 PG — SIGNIFICANT CHANGE UP (ref 27–34)
MCHC RBC-ENTMCNC: 32.9 GM/DL — SIGNIFICANT CHANGE UP (ref 32–36)
MCV RBC AUTO: 88.2 FL — SIGNIFICANT CHANGE UP (ref 80–100)
NRBC # BLD: 0 /100 WBCS — SIGNIFICANT CHANGE UP (ref 0–0)
PHOSPHATE SERPL-MCNC: 3.1 MG/DL — SIGNIFICANT CHANGE UP (ref 2.5–4.5)
PLATELET # BLD AUTO: 292 K/UL — SIGNIFICANT CHANGE UP (ref 150–400)
POTASSIUM SERPL-MCNC: 3.8 MMOL/L — SIGNIFICANT CHANGE UP (ref 3.5–5.3)
POTASSIUM SERPL-SCNC: 3.8 MMOL/L — SIGNIFICANT CHANGE UP (ref 3.5–5.3)
RBC # BLD: 4.14 M/UL — SIGNIFICANT CHANGE UP (ref 3.8–5.2)
RBC # FLD: 14.3 % — SIGNIFICANT CHANGE UP (ref 10.3–14.5)
SODIUM SERPL-SCNC: 140 MMOL/L — SIGNIFICANT CHANGE UP (ref 135–145)
SPECIMEN SOURCE: SIGNIFICANT CHANGE UP
SPECIMEN SOURCE: SIGNIFICANT CHANGE UP
WBC # BLD: 6.57 K/UL — SIGNIFICANT CHANGE UP (ref 3.8–10.5)
WBC # FLD AUTO: 6.57 K/UL — SIGNIFICANT CHANGE UP (ref 3.8–10.5)

## 2021-03-15 PROCEDURE — 93970 EXTREMITY STUDY: CPT

## 2021-03-15 PROCEDURE — 93005 ELECTROCARDIOGRAM TRACING: CPT

## 2021-03-15 PROCEDURE — 72125 CT NECK SPINE W/O DYE: CPT

## 2021-03-15 PROCEDURE — 85730 THROMBOPLASTIN TIME PARTIAL: CPT

## 2021-03-15 PROCEDURE — 82550 ASSAY OF CK (CPK): CPT

## 2021-03-15 PROCEDURE — 84443 ASSAY THYROID STIM HORMONE: CPT

## 2021-03-15 PROCEDURE — 85027 COMPLETE CBC AUTOMATED: CPT

## 2021-03-15 PROCEDURE — 87040 BLOOD CULTURE FOR BACTERIA: CPT

## 2021-03-15 PROCEDURE — 99285 EMERGENCY DEPT VISIT HI MDM: CPT | Mod: 25

## 2021-03-15 PROCEDURE — 82728 ASSAY OF FERRITIN: CPT

## 2021-03-15 PROCEDURE — 86769 SARS-COV-2 COVID-19 ANTIBODY: CPT

## 2021-03-15 PROCEDURE — 84100 ASSAY OF PHOSPHORUS: CPT

## 2021-03-15 PROCEDURE — 83880 ASSAY OF NATRIURETIC PEPTIDE: CPT

## 2021-03-15 PROCEDURE — 83540 ASSAY OF IRON: CPT

## 2021-03-15 PROCEDURE — 99239 HOSP IP/OBS DSCHRG MGMT >30: CPT

## 2021-03-15 PROCEDURE — 82962 GLUCOSE BLOOD TEST: CPT

## 2021-03-15 PROCEDURE — 70450 CT HEAD/BRAIN W/O DYE: CPT

## 2021-03-15 PROCEDURE — 71045 X-RAY EXAM CHEST 1 VIEW: CPT

## 2021-03-15 PROCEDURE — 97162 PT EVAL MOD COMPLEX 30 MIN: CPT

## 2021-03-15 PROCEDURE — 93306 TTE W/DOPPLER COMPLETE: CPT

## 2021-03-15 PROCEDURE — 82607 VITAMIN B-12: CPT

## 2021-03-15 PROCEDURE — 83550 IRON BINDING TEST: CPT

## 2021-03-15 PROCEDURE — 83036 HEMOGLOBIN GLYCOSYLATED A1C: CPT

## 2021-03-15 PROCEDURE — 83735 ASSAY OF MAGNESIUM: CPT

## 2021-03-15 PROCEDURE — 80048 BASIC METABOLIC PNL TOTAL CA: CPT

## 2021-03-15 PROCEDURE — 85610 PROTHROMBIN TIME: CPT

## 2021-03-15 PROCEDURE — 80053 COMPREHEN METABOLIC PANEL: CPT

## 2021-03-15 PROCEDURE — 82746 ASSAY OF FOLIC ACID SERUM: CPT

## 2021-03-15 PROCEDURE — 82009 KETONE BODYS QUAL: CPT

## 2021-03-15 PROCEDURE — 84484 ASSAY OF TROPONIN QUANT: CPT

## 2021-03-15 PROCEDURE — 85025 COMPLETE CBC W/AUTO DIFF WBC: CPT

## 2021-03-15 PROCEDURE — 83605 ASSAY OF LACTIC ACID: CPT

## 2021-03-15 PROCEDURE — 87635 SARS-COV-2 COVID-19 AMP PRB: CPT

## 2021-03-15 PROCEDURE — 80061 LIPID PANEL: CPT

## 2021-03-15 PROCEDURE — U0005: CPT

## 2021-03-15 PROCEDURE — 0031A: CPT

## 2021-03-15 PROCEDURE — 36415 COLL VENOUS BLD VENIPUNCTURE: CPT

## 2021-03-15 RX ORDER — BENAZEPRIL HYDROCHLORIDE 40 MG/1
1 TABLET ORAL
Qty: 0 | Refills: 0 | DISCHARGE

## 2021-03-15 RX ORDER — ACETAMINOPHEN 500 MG
2 TABLET ORAL
Qty: 0 | Refills: 0 | DISCHARGE
Start: 2021-03-15

## 2021-03-15 RX ORDER — SENNA PLUS 8.6 MG/1
2 TABLET ORAL
Qty: 0 | Refills: 0 | DISCHARGE
Start: 2021-03-15

## 2021-03-15 RX ORDER — CHOLECALCIFEROL (VITAMIN D3) 125 MCG
1000 CAPSULE ORAL
Qty: 0 | Refills: 0 | DISCHARGE
Start: 2021-03-15

## 2021-03-15 RX ORDER — CEPHALEXIN 500 MG
1 CAPSULE ORAL
Qty: 21 | Refills: 0
Start: 2021-03-15 | End: 2021-03-21

## 2021-03-15 RX ORDER — FUROSEMIDE 40 MG
1 TABLET ORAL
Qty: 60 | Refills: 0
Start: 2021-03-15 | End: 2021-04-13

## 2021-03-15 RX ORDER — PANTOPRAZOLE SODIUM 20 MG/1
1 TABLET, DELAYED RELEASE ORAL
Qty: 0 | Refills: 0 | DISCHARGE
Start: 2021-03-15

## 2021-03-15 RX ORDER — JNJ-78436735 50000000000 [PFU]/.5ML
0.5 SUSPENSION INTRAMUSCULAR ONCE
Refills: 0 | Status: COMPLETED | OUTPATIENT
Start: 2021-03-15 | End: 2021-03-15

## 2021-03-15 RX ORDER — CHOLECALCIFEROL (VITAMIN D3) 125 MCG
1 CAPSULE ORAL
Qty: 0 | Refills: 0 | DISCHARGE

## 2021-03-15 RX ADMIN — Medication 12.5 MILLIGRAM(S): at 05:32

## 2021-03-15 RX ADMIN — JNJ-78436735 0.5 MILLILITER(S): 50000000000 SUSPENSION INTRAMUSCULAR at 11:48

## 2021-03-15 RX ADMIN — Medication 1000 UNIT(S): at 11:21

## 2021-03-15 RX ADMIN — Medication 1 APPLICATION(S): at 11:24

## 2021-03-15 RX ADMIN — Medication 100 MILLIGRAM(S): at 05:32

## 2021-03-15 RX ADMIN — HEPARIN SODIUM 5000 UNIT(S): 5000 INJECTION INTRAVENOUS; SUBCUTANEOUS at 14:17

## 2021-03-15 RX ADMIN — ESCITALOPRAM OXALATE 10 MILLIGRAM(S): 10 TABLET, FILM COATED ORAL at 11:21

## 2021-03-15 RX ADMIN — Medication 20 MILLIGRAM(S): at 05:37

## 2021-03-15 RX ADMIN — HEPARIN SODIUM 5000 UNIT(S): 5000 INJECTION INTRAVENOUS; SUBCUTANEOUS at 05:33

## 2021-03-15 RX ADMIN — PANTOPRAZOLE SODIUM 40 MILLIGRAM(S): 20 TABLET, DELAYED RELEASE ORAL at 05:33

## 2021-03-15 RX ADMIN — Medication 81 MILLIGRAM(S): at 11:22

## 2021-03-15 RX ADMIN — Medication 100 MILLIGRAM(S): at 14:17

## 2021-03-15 NOTE — DISCHARGE NOTE PROVIDER - NSDCMRMEDTOKEN_GEN_ALL_CORE_FT
aspirin 81 mg oral tablet, chewable: 1 tab(s) orally once a day  atorvastatin 40 mg oral tablet: 1 tab(s) orally once a day (at bedtime)  benazepril 40 mg oral tablet: 1 tab(s) orally once a day  cholecalciferol oral tablet: 1000 unit(s) orally once a day  furosemide 20 mg oral tablet: 3 tab(s) orally once a day  Lexapro 10 mg oral tablet: 1 tab(s) orally once a day  metoprolol succinate 25 mg oral tablet, extended release: 0.5 tab(s) orally once a day  Vitamin D3 1000 intl units (25 mcg) oral tablet: 1 tab(s) orally once a day   acetaminophen 325 mg oral tablet: 2 tab(s) orally every 6 hours, As needed, Temp greater or equal to 38C (100.4F), Mild Pain (1 - 3)  aspirin 81 mg oral tablet, chewable: 1 tab(s) orally once a day  atorvastatin 40 mg oral tablet: 1 tab(s) orally once a day (at bedtime)  cholecalciferol oral tablet: 1000 unit(s) orally once a day  furosemide 40 mg oral tablet: 1 tab(s) orally 2 times a day   Keflex 500 mg oral capsule: 1 cap(s) orally 3 times a day   Lexapro 10 mg oral tablet: 1 tab(s) orally once a day  metoprolol succinate 25 mg oral tablet, extended release: 0.5 tab(s) orally once a day  pantoprazole 40 mg oral delayed release tablet: 1 tab(s) orally once a day (before a meal)  senna oral tablet: 2 tab(s) orally once a day (at bedtime)  silver sulfADIAZINE 1% topical cream: 1 application topically once a day

## 2021-03-15 NOTE — PROGRESS NOTE ADULT - ASSESSMENT
Bilateral leg cellulitis  Bilateral leg venous stasis ulcers    Plan: Continue Ancef 500 mg iv q8  If being discharged today, can go on Keflex 500mg po TID for 5 days

## 2021-03-15 NOTE — PROGRESS NOTE ADULT - ATTENDING COMMENTS
I agree with above
I agree with above
Repeat COVID negative. Likely dc in AM to [facility].
Patient seen and examined. Feels well. Is hoping to leave the hospital soon. Denies any pain to her legs. Plan to continue IV diuresis for now and IV Ancef for possible supraimposed infection of venous stasis ulcers.  Anticipate dc to [facility] likely Monday. Remaining care as above.
Patient seen and examined. Offers no new complaints. Reports her legs feel better after they were dressed by podiatry. Will continue Ancef and local wound care. Await PT evaluation. Remaining care as above.
Patient seen and examined. Case discussed with housestaff. Patient offers no complaints at this time. She reports her legs are feeling better with the wound care. Appreciate podiatric assistance with ulcer management. Will continue IV Ancef with plans to transition to po on Monday for possible eventual dc to [facility]. Continue IV diuresis with Lasix for duration of the weekend. Remaining care as above.

## 2021-03-15 NOTE — DISCHARGE NOTE PROVIDER - ATTENDING COMMENTS
Patient seen and examined this afternoon prior to dc. Patient expressed frustration that she remains in the hospital. [facility] accepted to [facility] and possible transition to [facility]. Received Darren and Darren COVID vaccine prior to discharge today. To complete abx course with Keflex as per ID. Medically stable for dc.    TIME SPENT ON DISCHARGE PLANNING AND COORDINATION OF CARE: 31mins

## 2021-03-15 NOTE — PROGRESS NOTE ADULT - SUBJECTIVE AND OBJECTIVE BOX
89y Female is under our care for     REVIEW OF SYSTEMS:  [  ] Not able to illicit  General:	  Chest:	  GI:	  :  Skin:	  Musculoskeletal:	  Neuro:	    MEDS:  ceFAZolin   IVPB 500 milliGRAM(s) IV Intermittent every 8 hours  ceFAZolin   IVPB        ALLERGIES: Allergies    No Known Allergies    Intolerances        VITALS:  Vital Signs Last 24 Hrs  T(C): 36.8 (15 Mar 2021 05:20), Max: 37.6 (14 Mar 2021 20:16)  T(F): 98.2 (15 Mar 2021 05:20), Max: 99.7 (14 Mar 2021 20:16)  HR: 74 (15 Mar 2021 05:20) (64 - 76)  BP: 144/73 (15 Mar 2021 05:20) (133/59 - 144/73)  BP(mean): --  RR: 17 (15 Mar 2021 05:20) (17 - 18)  SpO2: 97% (15 Mar 2021 05:20) (97% - 98%)      PHYSICAL EXAM:  HEENT:  Neck:  Respiratory:  Cardiovascular:  Gastrointestinal:  Extremities:  Skin:  Ortho:  Neuro:    LABS/DIAGNOSTIC TESTS:                        12.0   6.57  )-----------( 292      ( 15 Mar 2021 06:34 )             36.5     WBC Count: 6.57 K/uL (03-15 @ 06:34)  WBC Count: 6.36 K/uL (03-14 @ 08:22)  WBC Count: 6.99 K/uL (03-13 @ 07:47)  WBC Count: 7.52 K/uL (03-12 @ 07:31)  WBC Count: 6.85 K/uL (03-11 @ 07:15)    03-15    140  |  104  |  22<H>  ----------------------------<  103<H>  3.8   |  30  |  1.04    Ca    8.7      15 Mar 2021 06:34  Phos  3.1     03-15  Mg     2.1     03-15        CULTURES:   .Blood Blood  03-10 @ 21:47   No growth to date.  --  --        RADIOLOGY:  no new studies 89y Female is under our care for bilateral leg cellulitis.  Patient was seen laying comfortably in bed with no acute distress.  Patients bilateral leg wounds have improved, patient remains afebrile and denies any chills.       REVIEW OF SYSTEMS:  [  ] Not able to illicit  General: no fevers no malaise   Chest: no cough no sob no CP  GI: no nvd no abdominal pain  : no urinary symptoms   Skin: bilateral leg rash  Musculoskeletal: no trauma no LBP  Neuro: no ha's no dizziness     MEDS:  ceFAZolin   IVPB 500 milliGRAM(s) IV Intermittent every 8 hours  ceFAZolin   IVPB        ALLERGIES: Allergies    No Known Allergies    Intolerances        VITALS:  Vital Signs Last 24 Hrs  T(C): 36.8 (15 Mar 2021 05:20), Max: 37.6 (14 Mar 2021 20:16)  T(F): 98.2 (15 Mar 2021 05:20), Max: 99.7 (14 Mar 2021 20:16)  HR: 74 (15 Mar 2021 05:20) (64 - 76)  BP: 144/73 (15 Mar 2021 05:20) (133/59 - 144/73)  BP(mean): --  RR: 17 (15 Mar 2021 05:20) (17 - 18)  SpO2: 97% (15 Mar 2021 05:20) (97% - 98%)      PHYSICAL EXAM:  HEENT: normocephalic with moist oral mucosa, Left periorbital ecchymosis (from fall)  Neck: supple no LN's no JVD  Respiratory: lungs clear no rales no rhonchi  Cardiovascular: S1 S2 reg no murmurs  Gastrointestinal: +BS with soft, nondistended abdomen; nontender, no guarding, no rigidity, no organomegaly  Extremities: Right leg edema +1  Skin:  Warmth and erythema almost resolved on bilateral legs  Ortho: no jt swelling  Neuro: AAO x 2    LABS/DIAGNOSTIC TESTS:                        12.0   6.57  )-----------( 292      ( 15 Mar 2021 06:34 )             36.5     WBC Count: 6.57 K/uL (03-15 @ 06:34)  WBC Count: 6.36 K/uL (03-14 @ 08:22)  WBC Count: 6.99 K/uL (03-13 @ 07:47)  WBC Count: 7.52 K/uL (03-12 @ 07:31)  WBC Count: 6.85 K/uL (03-11 @ 07:15)    03-15    140  |  104  |  22<H>  ----------------------------<  103<H>  3.8   |  30  |  1.04    Ca    8.7      15 Mar 2021 06:34  Phos  3.1     03-15  Mg     2.1     03-15        CULTURES:   .Blood Blood  03-10 @ 21:47   No growth to date.  --  --        RADIOLOGY:  no new studies

## 2021-03-15 NOTE — DISCHARGE NOTE NURSING/CASE MANAGEMENT/SOCIAL WORK - NSDCVIVACCINE_GEN_ALL_CORE_FT
Severe acute respiratory syndrome coronavirus 2 (SARS-CoV-2) (Coronavirus disease [COVID-19]) vaccine , 2021/3/15 11:48 , Radha Yuan (MAYANK)

## 2021-03-15 NOTE — PROGRESS NOTE ADULT - PROVIDER SPECIALTY LIST ADULT
Hospitalist
Infectious Disease
Infectious Disease
Podiatry
Internal Medicine

## 2021-03-15 NOTE — PROGRESS NOTE ADULT - REASON FOR ADMISSION
Multiple falls at home

## 2021-03-15 NOTE — DISCHARGE NOTE NURSING/CASE MANAGEMENT/SOCIAL WORK - PATIENT PORTAL LINK FT
You can access the FollowMyHealth Patient Portal offered by [facility] by registering at the following website: [email or web address]. By joining [employer]’s FollowMyHealth portal, you will also be able to view your health information using other applications (apps) compatible with our system.

## 2021-03-15 NOTE — DISCHARGE NOTE PROVIDER - NSDCCPCAREPLAN_GEN_ALL_CORE_FT
PRINCIPAL DISCHARGE DIAGNOSIS  Diagnosis: Cellulitis  Assessment and Plan of Treatment: You came with swelling and redness of your lower legs. You were seen by podiatry and infectious disease doctors. You were started on antibiotics. You received wound care and venous dopplers were done to rule out any clots. You did not have any clots. You will be discharged on Keflex 500mg three times a day for 1 week. You are recommended to Continue daily dressing changes with silvadene cream on both lower legs..... follow........      SECONDARY DISCHARGE DIAGNOSES  Diagnosis: CHF (congestive heart failure)  Assessment and Plan of Treatment: Continue to take lasix and metoprolol. Your lasix dose has been increased to 40mg two times a day.       Diagnosis: Frequent falls  Assessment and Plan of Treatment: We did a CT head which showed no acute chamges or strokes. You were seen by physical therapy. They recommended subacute rehab.    Diagnosis: HTN (hypertension)  Assessment and Plan of Treatment: You take lasix and Metoprolol ( based on previous med rec)  Monitor vitals       PRINCIPAL DISCHARGE DIAGNOSIS  Diagnosis: Cellulitis  Assessment and Plan of Treatment: You came with swelling and redness of your lower legs. You were seen by podiatry and infectious disease doctors. You were started on antibiotics. You received wound care and venous dopplers were done to rule out any clots. You did not have any clots. You will be discharged on Keflex 500mg three times a day for 1 week. You are recommended to Continue daily dressing changes with silvadene cream on both lower legs..... follow........      SECONDARY DISCHARGE DIAGNOSES  Diagnosis: HTN (hypertension)  Assessment and Plan of Treatment: You take lasix, benazepril and Metoprolol at home. We increased your lasix dose, discontinued benazepril and decreased the dose of metoprolol.  Monitor your blood pressure and follow up with your PCP in 1-2 weeks.       Diagnosis: Prediabetes  Assessment and Plan of Treatment: You have prediabetes, you were given insulin as needed in the hospital. You should exercise regularly or atleat 3 times a week and eat a low carbohydrate diet. Follow up with your PCP i 1-2 weeks.    Diagnosis: CHF (congestive heart failure)  Assessment and Plan of Treatment: Continue to take lasix and metoprolol. Your lasix dose has been increased to 40mg two times a day.       Diagnosis: Frequent falls  Assessment and Plan of Treatment: We did a CT head which showed no acute changes or strokes. You were seen by physical therapy. They recommended subacute rehab.     PRINCIPAL DISCHARGE DIAGNOSIS  Diagnosis: Cellulitis  Assessment and Plan of Treatment: You came with swelling and redness of your lower legs. You were seen by podiatry and infectious disease doctors. You were started on antibiotics. You received wound care and venous dopplers were done to rule out any clots. You did not have any clots. You will be discharged on Keflex 500mg three times a day for 1 week. You are recommended to Continue daily dry dressing changes with silvadene cream on both lower legs. Follow up with podiatry at [address] [place] [address] within 1 week after discharge      SECONDARY DISCHARGE DIAGNOSES  Diagnosis: HTN (hypertension)  Assessment and Plan of Treatment: You take lasix, benazepril and Metoprolol at home. We increased your lasix dose, discontinued benazepril and decreased the dose of metoprolol.  Monitor your blood pressure and follow up with your PCP in 1-2 weeks.       Diagnosis: Prediabetes  Assessment and Plan of Treatment: You have prediabetes, you were given insulin as needed in the hospital. You should exercise regularly or atleat 3 times a week and eat a low carbohydrate diet. Follow up with your PCP i 1-2 weeks.    Diagnosis: CHF (congestive heart failure)  Assessment and Plan of Treatment: Continue to take lasix and metoprolol. Your lasix dose has been increased to 40mg two times a day.       Diagnosis: Frequent falls  Assessment and Plan of Treatment: We did a CT head which showed no acute changes or strokes. You were seen by physical therapy. They recommended subacute rehab.     PRINCIPAL DISCHARGE DIAGNOSIS  Diagnosis: Cellulitis  Assessment and Plan of Treatment: You came with swelling and redness of your lower legs. You were seen by podiatry and infectious disease doctors. You were started on antibiotics. You received wound care and venous dopplers were done to rule out any clots. You did not have any clots. You will be discharged on Keflex 500mg three times a day for 5 more days. You are recommended to Continue daily dry dressing changes with silvadene cream on both lower legs. Follow up with podiatry at [address] [place] [address] within 1 week after discharge      SECONDARY DISCHARGE DIAGNOSES  Diagnosis: Prediabetes  Assessment and Plan of Treatment: You have prediabetes, you were given insulin as needed in the hospital. You should exercise regularly or atleat 3 times a week and eat a low carbohydrate diet. Follow up with your PCP in 1-2 weeks.    Diagnosis: HTN (hypertension)  Assessment and Plan of Treatment: You take lasix, benazepril and Metoprolol at home. We increased your lasix dose, discontinued benazepril and decreased the dose of metoprolol.  Monitor your blood pressure and follow up with your PCP in 1-2 weeks.       Diagnosis: CHF (congestive heart failure)  Assessment and Plan of Treatment: Continue to take lasix and metoprolol. Your lasix dose has been increased to 40mg two times a day to help with your leg swelling. Please take your lasix every day in morning and in afternoon.    Diagnosis: Frequent falls  Assessment and Plan of Treatment: We did a CT head which showed no acute changes or strokes. You were seen by physical therapy. They recommended subacute rehab.

## 2021-03-15 NOTE — DISCHARGE NOTE PROVIDER - HOSPITAL COURSE
89 female, from home, lives with Son and daughter in law,  with PMHx of Dementia,  HTN, CHF( last echo Dec 2019 EF 40-45% g1dd) , Anxiety,  Chronic Venous ulcers,  prediabetes presents to the ED c/o BLE pain/swelling x 3 months. Pt with intermittent confusion for the past 3 months, AAOX2 ( Baseline). All collateral information was obtained from ED physician who  also spoke to the  son Mikhail ([phone]).  Son was not reachable the same number to me later. As per ED physician,  Son states that  Pt has been having frequent falls in the past 2-3 months, last fall  occurring this morning at 4AM, unwitnessed. Pt's son states that Pt  was found next to   her bed lying down on her back, complaining of b/l  leg pain which has been chronic for the past 2-3 months. She was also noted to have a new Ecchymosis around the L Eye but was awake on their arrival.  Pt is unable to walk well recently secondary to chronic pain. Pt has been having leg swelling for years now, being treated with 3 courses of abx for the past 2 months, last dose of Augmentin x today, been on this for the last 9 days 500mg per day. Pt noted to have heber legs with ulcers and foul-smelling discharge. Pt's appetite otherwise good. Pt denies fever, chills, headache, dizziness, CP, SOB  or any other complaints.  Denies any smoking, alcohol or any substance abuse.     Pt admitted for chronic leg pain, swelling and weakness leading to multiple falls in past. Pt had her recent most fall day prior to admission and suffered trauma to her head. CT head was negative for any acute changes. Pt has chronic venous ulcers which appeared infected with foul smelling discharge. Pt was seen by podiatry and ID. Started on cefazolin. Blood cultures, negative. Venous doppler negative for DVT. Pt recommended BRIGIDO. Pt had DANISH on admission which resolved with IV hydration. Pt will be discharged on keflex 500mg TID.    Patient is stable for discharge     Problem/Plan - 3:  ·  Problem: DANISH (acute kidney injury).  Plan: Cr 1.4 ( baseline 0.84 in Dec 2019)  DANISH Likely due to dehydration   Resolved with IV fluids.      Problem/Plan - 4:  ·  Problem: CHF (congestive heart failure).  Plan: Pt has PMH of HFrEF  ( last echo Dec 2019 EF 40-45% g1dd  F/U strict i/o, daily weight   f/u ECHO  started on lasix and Metoprolol.      Problem/Plan - 5:  ·  Problem: HTN (hypertension).  Plan: Pt has PMH of HTN   started on lasix and Metoprolol ( based on previous med rec)  Monitor vitals  DASH diet.      Problem/Plan - 6:  Problem: Prediabetes. Plan: Pt has PMH of prediabetes  RBS- 109  A1C 6.3  started on HSS  Monitor BG.     Problem/Plan - 7:  ·  Problem: Anxiety.  Plan: Pt has PMH of anxiety   Pt on lexapro at home  Continue home medication.    89 female, from home, lives with Son and daughter in law,  with PMHx of Dementia,  HTN, CHF( last echo Dec 2019 EF 40-45% g1dd) , Anxiety,  Chronic Venous ulcers,  prediabetes presents to the ED c/o BLE pain/swelling x 3 months. Pt with intermittent confusion for the past 3 months, AAOX2 ( Baseline). All collateral information was obtained from ED physician who  also spoke to the  son Mikhail ([phone]).  Son was not reachable the same number to me later. As per ED physician,  Son states that  Pt has been having frequent falls in the past 2-3 months, last fall  occurring this morning at 4AM, unwitnessed. Pt's son states that Pt  was found next to   her bed lying down on her back, complaining of b/l  leg pain which has been chronic for the past 2-3 months. She was also noted to have a new Ecchymosis around the L Eye but was awake on their arrival.  Pt is unable to walk well recently secondary to chronic pain. Pt has been having leg swelling for years now, being treated with 3 courses of abx for the past 2 months, last dose of Augmentin x today, been on this for the last 9 days 500mg per day. Pt noted to have heber legs with ulcers and foul-smelling discharge. Pt's appetite otherwise good. Pt denies fever, chills, headache, dizziness, CP, SOB  or any other complaints.  Denies any smoking, alcohol or any substance abuse.     Pt admitted for chronic leg pain, swelling and weakness leading to multiple falls in past. Pt had her recent most fall day prior to admission and suffered trauma to her head. CT head was negative for any acute changes. Pt has chronic venous ulcers which appeared infected with foul smelling discharge. Pt was seen by podiatry and ID. Started on cefazolin. Blood cultures, negative. Venous doppler negative for DVT. Pt recommended BRIGIDO. Pt had DANISH on admission which resolved with IV hydration. Pt will be discharged on keflex 500mg TID.    Patient is stable for discharge     89 female, from home, lives with Son and daughter in law,  with PMHx of Dementia,  HTN, CHF( last echo Dec 2019 EF 40-45% g1dd) , Anxiety,  Chronic Venous ulcers,  prediabetes presents to the ED c/o BLE pain/swelling x 3 months. Pt with intermittent confusion for the past 3 months, AAOX2 ( Baseline). All collateral information was obtained from ED physician who  also spoke to the  son Mikhail ([phone]).  Son was not reachable the same number to me later. As per ED physician,  Son states that  Pt has been having frequent falls in the past 2-3 months, last fall  occurring this morning at 4AM, unwitnessed. Pt's son states that Pt  was found next to   her bed lying down on her back, complaining of b/l  leg pain which has been chronic for the past 2-3 months. She was also noted to have a new Ecchymosis around the L Eye but was awake on their arrival.  Pt is unable to walk well recently secondary to chronic pain. Pt has been having leg swelling for years now, being treated with 3 courses of abx for the past 2 months, last dose of Augmentin x today, been on this for the last 9 days 500mg per day. Pt noted to have heber legs with ulcers and foul-smelling discharge. Pt's appetite otherwise good. Pt denies fever, chills, headache, dizziness, CP, SOB  or any other complaints.  Denies any smoking, alcohol or any substance abuse.     Pt admitted for chronic leg pain, swelling and weakness leading to multiple falls in past. Pt had her most recent fall day prior to admission and suffered trauma to her head. CT head was negative for any acute changes. Pt has chronic venous ulcers which appeared infected with foul smelling discharge. Pt was seen by podiatry and ID. Started on cefazolin. Blood cultures, negative. Venous doppler negative for DVT. Pt recommended BRIGIDO. Pt had DANISH on admission which resolved with IV hydration. Pt will be discharged on keflex 500mg TID. Hypertensive medications     Patient is stable for discharge     89 female, from home, lives with Son and daughter in law,  with PMHx of Dementia,  HTN, CHF( last echo Dec 2019 EF 40-45% g1dd) , Anxiety,  Chronic Venous ulcers,  prediabetes presents to the ED c/o BLE pain/swelling x 3 months. Pt with intermittent confusion for the past 3 months, AAOX2 ( Baseline). All collateral information was obtained from ED physician who  also spoke to the  son Mikhail ([phone]).  Son was not reachable the same number to me later. As per ED physician,  Son states that  Pt has been having frequent falls in the past 2-3 months, last fall  occurring this morning at 4AM, unwitnessed. Pt's son states that Pt  was found next to   her bed lying down on her back, complaining of b/l  leg pain which has been chronic for the past 2-3 months. She was also noted to have a new Ecchymosis around the L Eye but was awake on their arrival.  Pt is unable to walk well recently secondary to chronic pain. Pt has been having leg swelling for years now, being treated with 3 courses of abx for the past 2 months, last dose of Augmentin x today, been on this for the last 9 days 500mg per day. Pt noted to have heber legs with ulcers and foul-smelling discharge. Pt's appetite otherwise good. Pt denies fever, chills, headache, dizziness, CP, SOB  or any other complaints.  Denies any smoking, alcohol or any substance abuse.     Pt admitted for chronic leg pain, swelling and weakness leading to multiple falls in past. Pt had her most recent fall day prior to admission and suffered trauma to her head. CT head was negative for any acute changes. Pt has chronic venous ulcers which appeared infected with foul smelling discharge. Pt was seen by podiatry and ID. Started on cefazolin. Blood cultures, negative. Venous doppler negative for DVT. Pt recommended BRIGIDO. Pt was diuresed with IV lasix for LE edema with improvement of Cr as well as her legs. Pt will be discharged on keflex 500mg TID. She received her Darren and Darren COVID shot prior to dc.    Patient is stable for discharge

## 2022-04-01 NOTE — ED ADULT TRIAGE NOTE - NS ED TRIAGE AVPU SCALE
SOB
Alert-The patient is alert, awake and responds to voice. The patient is oriented to time, place, and person. The triage nurse is able to obtain subjective information.

## 2022-10-20 NOTE — ED PROVIDER NOTE - CROS ED HEME ALL NEG
negative... Bactrim Counseling:  I discussed with the patient the risks of sulfa antibiotics including but not limited to GI upset, allergic reaction, drug rash, diarrhea, dizziness, photosensitivity, and yeast infections.  Rarely, more serious reactions can occur including but not limited to aplastic anemia, agranulocytosis, methemoglobinemia, blood dyscrasias, liver or kidney failure, lung infiltrates or desquamative/blistering drug rashes.

## 2023-11-05 NOTE — PROGRESS NOTE ADULT - PROBLEM SELECTOR PLAN 8
You can access the FollowMyHealth Patient Portal offered by [facility] by registering at the following website: [email or web address]. By joining [employer]’s FollowMyHealth portal, you will also be able to view your health information using other applications (apps) compatible with our system. IMPROVE VTE Individual Risk Assessment    RISK                                                                Points    [  ] Previous VTE                                                  3    [  ] Thrombophilia                                               2    [  ] Lower limb paralysis                                      2        (unable to hold up >15 seconds)      [  ] Current Cancer                                              2         (within 6 months)  [X] Immobilization > 24 hrs                                1  [  ] ICU/CCU stay > 24 hours                              1    [X] Age > 60                                                      1    IMPROVE VTE Score _____2____  c/w Heparin SC 5000 q8 Patent

## 2024-02-20 NOTE — ED ADULT NURSE NOTE - CAS EDN DISCHARGE INTERVENTIONS
Diagnosis/Prognosis/MOLST Discussed/Treatment Options/Chaplaincy Referral/Hospice Referral/Palliative Care Referral
IV intact/Arm band on

## 2024-03-26 NOTE — PHYSICAL THERAPY INITIAL EVALUATION ADULT - NS ASR RISK AREAS PT EVAL
[Follow-Up] : a follow-up visit [Asthma] : asthma [COPD] : COPD [Cough] : cough [Shortness of Breath] : shortness of breath fall

## 2024-04-15 NOTE — PHYSICAL THERAPY INITIAL EVALUATION ADULT - BALANCE DISTURBANCE, IDENTIFIED IMPAIRMENT CONTRIBUTE, REHAB EVAL
[facility] OBSERVATION DEPT  [address]  [facility] [address]  Phone: [phone]    April 25, 2024        Machelle Weiner  [address] [place] [id number]          To whom it may concern:    RE: Machelle Weiner    She was hospitalized from 4/15/2024 until 4/25/2024.  She may return to work as able. She is unable to drive while on sedating and/or opiate (narcotic) medications.  She will follow up with her PCP on 4/30/2024, at which time a new work release will be written.     Sincerely,    /s/ CARLOS Li, Diogo.D, APRN, CNP          decreased ROM/pain